# Patient Record
Sex: FEMALE | Race: WHITE | NOT HISPANIC OR LATINO | Employment: FULL TIME | URBAN - METROPOLITAN AREA
[De-identification: names, ages, dates, MRNs, and addresses within clinical notes are randomized per-mention and may not be internally consistent; named-entity substitution may affect disease eponyms.]

---

## 2019-11-26 ENCOUNTER — OFFICE VISIT (OUTPATIENT)
Dept: FAMILY MEDICINE | Facility: CLINIC | Age: 44
End: 2019-11-26
Payer: COMMERCIAL

## 2019-11-26 VITALS
HEART RATE: 96 BPM | HEIGHT: 60 IN | DIASTOLIC BLOOD PRESSURE: 80 MMHG | SYSTOLIC BLOOD PRESSURE: 126 MMHG | WEIGHT: 196 LBS | BODY MASS INDEX: 38.48 KG/M2

## 2019-11-26 DIAGNOSIS — Z00.00 PHYSICAL EXAM: ICD-10-CM

## 2019-11-26 DIAGNOSIS — F32.A DEPRESSION, UNSPECIFIED DEPRESSION TYPE: Primary | ICD-10-CM

## 2019-11-26 DIAGNOSIS — F41.9 ANXIETY: ICD-10-CM

## 2019-11-26 DIAGNOSIS — I10 HYPERTENSION, UNSPECIFIED TYPE: ICD-10-CM

## 2019-11-26 DIAGNOSIS — I10 ESSENTIAL HYPERTENSION: ICD-10-CM

## 2019-11-26 DIAGNOSIS — E66.9 OBESITY, UNSPECIFIED CLASSIFICATION, UNSPECIFIED OBESITY TYPE, UNSPECIFIED WHETHER SERIOUS COMORBIDITY PRESENT: ICD-10-CM

## 2019-11-26 PROCEDURE — 3079F DIAST BP 80-89 MM HG: CPT | Mod: S$GLB,,, | Performed by: NURSE PRACTITIONER

## 2019-11-26 PROCEDURE — 99214 OFFICE O/P EST MOD 30 MIN: CPT | Mod: S$GLB,,, | Performed by: NURSE PRACTITIONER

## 2019-11-26 PROCEDURE — 3079F PR MOST RECENT DIASTOLIC BLOOD PRESSURE 80-89 MM HG: ICD-10-PCS | Mod: S$GLB,,, | Performed by: NURSE PRACTITIONER

## 2019-11-26 PROCEDURE — 3008F BODY MASS INDEX DOCD: CPT | Mod: S$GLB,,, | Performed by: NURSE PRACTITIONER

## 2019-11-26 PROCEDURE — 3074F PR MOST RECENT SYSTOLIC BLOOD PRESSURE < 130 MM HG: ICD-10-PCS | Mod: S$GLB,,, | Performed by: NURSE PRACTITIONER

## 2019-11-26 PROCEDURE — 3008F PR BODY MASS INDEX (BMI) DOCUMENTED: ICD-10-PCS | Mod: S$GLB,,, | Performed by: NURSE PRACTITIONER

## 2019-11-26 PROCEDURE — 3074F SYST BP LT 130 MM HG: CPT | Mod: S$GLB,,, | Performed by: NURSE PRACTITIONER

## 2019-11-26 PROCEDURE — 99214 PR OFFICE/OUTPT VISIT, EST, LEVL IV, 30-39 MIN: ICD-10-PCS | Mod: S$GLB,,, | Performed by: NURSE PRACTITIONER

## 2019-11-26 RX ORDER — FLUTICASONE PROPIONATE 50 MCG
2 SPRAY, SUSPENSION (ML) NASAL EVERY MORNING
COMMUNITY
Start: 2019-04-08 | End: 2020-04-07

## 2019-11-26 RX ORDER — ESCITALOPRAM OXALATE 10 MG/1
10 TABLET ORAL DAILY
Qty: 90 TABLET | Refills: 1 | Status: SHIPPED | OUTPATIENT
Start: 2019-11-26 | End: 2020-05-28 | Stop reason: SDUPTHER

## 2019-11-26 RX ORDER — LISINOPRIL 10 MG/1
10 TABLET ORAL DAILY
Refills: 1 | COMMUNITY
Start: 2019-08-29 | End: 2019-11-26 | Stop reason: SDUPTHER

## 2019-11-26 RX ORDER — LISINOPRIL 10 MG/1
10 TABLET ORAL DAILY
Qty: 90 TABLET | Refills: 1 | Status: SHIPPED | OUTPATIENT
Start: 2019-11-26 | End: 2020-05-28 | Stop reason: SDUPTHER

## 2019-11-26 RX ORDER — CETIRIZINE HYDROCHLORIDE 10 MG/1
10 TABLET ORAL DAILY
COMMUNITY

## 2019-11-26 RX ORDER — ESCITALOPRAM OXALATE 10 MG/1
10 TABLET ORAL DAILY
Refills: 1 | COMMUNITY
Start: 2019-08-29 | End: 2019-11-26 | Stop reason: SDUPTHER

## 2019-11-26 NOTE — PROGRESS NOTES
SUBJECTIVE:    Patient ID: Zelda Acevedo is a 44 y.o. female.    Chief Complaint: Follow-up (SW)    PRESENTS FOR CHECK UP.  HAS BEEN FEELING OKAY.  REPORTS TAKING LEXAPRO AS PRESCRIBED.  BELIEVE THAT IS CONTROLLING SYMPTOMS OF ANXIETY AND DEPRESSION.  TAKING LISINOPRIL DAILY OCCASIONALLY CHECKS BLOOD PRESSURE AT HOME READINGS HAVE BEEN LESS THAN 130/90.  SINCE TAKING BLOOD PRESSURE MEDICATION HEADACHES HAVE RESOLVED.  HE HAS DAILY ALLERGIES.  TAKES ZYRTEC AND USES FLONASE WITH GOOD RESULTS.  SHE SLEEPS WELL.  DOES NOT EXERCISE.  LABS ARE DUE.  WILL PLAN TO DO BEFORE THE END OF THE YEAR.      No visits with results within 6 Month(s) from this visit.   Latest known visit with results is:   No results found for any previous visit.       History reviewed. No pertinent past medical history.  History reviewed. No pertinent surgical history.  History reviewed. No pertinent family history.    Marital Status:   Alcohol History:  reports that she drinks alcohol.  Tobacco History:  reports that she has never smoked. She has never used smokeless tobacco.  Drug History:  reports that she does not use drugs.    Review of patient's allergies indicates:  No Known Allergies    Current Outpatient Medications:     cetirizine (ZYRTEC) 10 MG tablet, Take 10 mg by mouth once daily., Disp: , Rfl:     escitalopram oxalate (LEXAPRO) 10 MG tablet, Take 1 tablet (10 mg total) by mouth once daily., Disp: 90 tablet, Rfl: 1    fluticasone propionate (FLONASE) 50 mcg/actuation nasal spray, 2 sprays by Each Nostril route every morning. , Disp: , Rfl:     lisinopril 10 MG tablet, Take 1 tablet (10 mg total) by mouth once daily., Disp: 90 tablet, Rfl: 1    Review of Systems   Constitutional: Negative for chills, fever and unexpected weight change.   HENT: Negative for ear pain, rhinorrhea and sore throat.    Eyes: Negative for pain and visual disturbance.   Respiratory: Negative for cough and shortness of breath.    Cardiovascular:  "Negative for chest pain, palpitations and leg swelling.   Gastrointestinal: Negative for abdominal pain, diarrhea, nausea and vomiting.   Genitourinary: Negative for difficulty urinating, hematuria and vaginal bleeding.   Musculoskeletal: Negative for arthralgias.   Skin: Negative for rash.   Neurological: Negative for dizziness, weakness and headaches.   Psychiatric/Behavioral: Negative for agitation and sleep disturbance. The patient is not nervous/anxious.           Objective:      Vitals:    11/26/19 0904   BP: 126/80   Pulse: 96   Weight: 88.9 kg (196 lb)   Height: 5' 0.25" (1.53 m)     Body mass index is 37.96 kg/m².  Physical Exam   Constitutional: She is oriented to person, place, and time. She appears well-developed and well-nourished.   HENT:   Head: Normocephalic.   Right Ear: External ear normal.   Left Ear: External ear normal.   Mouth/Throat: Oropharynx is clear and moist.   Eyes: Pupils are equal, round, and reactive to light. Conjunctivae are normal.   Neck: Normal range of motion. Neck supple. No JVD present.   Cardiovascular: Normal rate and regular rhythm.   No murmur heard.  Pulmonary/Chest: Effort normal and breath sounds normal.   Abdominal: Soft. Bowel sounds are normal.   Musculoskeletal: Normal range of motion. She exhibits no edema or deformity.   Lymphadenopathy:     She has no cervical adenopathy.   Neurological: She is alert and oriented to person, place, and time. Gait normal.   Skin: Skin is warm, dry and intact. No rash noted.   Psychiatric: She has a normal mood and affect. Her speech is normal and behavior is normal.         Assessment:       1. Depression, unspecified depression type    2. Hypertension, unspecified type    3. Anxiety    4. Essential hypertension    5. Obesity, unspecified classification, unspecified obesity type, unspecified whether serious comorbidity present    6. Physical exam         Plan:       Depression, unspecified depression type  -     escitalopram " oxalate (LEXAPRO) 10 MG tablet; Take 1 tablet (10 mg total) by mouth once daily.  Dispense: 90 tablet; Refill: 1    Hypertension, unspecified type  -     lisinopril 10 MG tablet; Take 1 tablet (10 mg total) by mouth once daily.  Dispense: 90 tablet; Refill: 1    Anxiety    Essential hypertension    Obesity, unspecified classification, unspecified obesity type, unspecified whether serious comorbidity present    Physical exam  Comments:  LABS AS ORDERED      Follow up in about 3 months (around 2/26/2020).

## 2020-06-04 ENCOUNTER — OFFICE VISIT (OUTPATIENT)
Dept: FAMILY MEDICINE | Facility: CLINIC | Age: 45
End: 2020-06-04
Payer: COMMERCIAL

## 2020-06-04 DIAGNOSIS — J30.1 ALLERGIC RHINITIS DUE TO POLLEN, UNSPECIFIED SEASONALITY: ICD-10-CM

## 2020-06-04 DIAGNOSIS — Z00.00 PHYSICAL EXAM: Primary | ICD-10-CM

## 2020-06-04 DIAGNOSIS — I10 HYPERTENSION, UNSPECIFIED TYPE: ICD-10-CM

## 2020-06-04 DIAGNOSIS — F32.A DEPRESSION, UNSPECIFIED DEPRESSION TYPE: ICD-10-CM

## 2020-06-04 PROCEDURE — 99213 PR OFFICE/OUTPT VISIT, EST, LEVL III, 20-29 MIN: ICD-10-PCS | Mod: 95,,, | Performed by: NURSE PRACTITIONER

## 2020-06-04 PROCEDURE — 99213 OFFICE O/P EST LOW 20 MIN: CPT | Mod: 95,,, | Performed by: NURSE PRACTITIONER

## 2020-06-04 RX ORDER — LISINOPRIL 10 MG/1
10 TABLET ORAL DAILY
Qty: 90 TABLET | Refills: 1 | Status: SHIPPED | OUTPATIENT
Start: 2020-06-04 | End: 2020-12-15 | Stop reason: SDUPTHER

## 2020-06-04 RX ORDER — ESCITALOPRAM OXALATE 20 MG/1
20 TABLET ORAL DAILY
Qty: 90 TABLET | Refills: 2 | Status: SHIPPED | OUTPATIENT
Start: 2020-06-04 | End: 2020-12-15 | Stop reason: SDUPTHER

## 2020-06-04 NOTE — PROGRESS NOTES
SUBJECTIVE:    Patient ID: Zelda Acevedo is a 44 y.o. female.    Chief Complaint: No chief complaint on file.    PRESENTS FOR CHECK UP.  HAS BEEN FEELING OKAY.  REPORTS TAKING LEXAPRO AS PRESCRIBED.  BELIEVE THAT IT IS NOT EFFECTIVE DUE TO BEING HOME. GETS IRRITABLE AT TIMES.   TAKING LISINOPRIL DAILY OCCASIONALLY CHECKS BLOOD PRESSURE AT HOME READINGS HAVE BEEN LESS THAN 130/90.  SINCE TAKING BLOOD PRESSURE MEDICATION HEADACHES HAVE RESOLVED.  HE HAS DAILY ALLERGIES.  TAKES ZYRTEC AND USES FLONASE WITH GOOD RESULTS.  SHE SLEEPS WELL.  DOES NOT EXERCISE.  LABS ARE DUE.  WILL PLAN TO DO BEFORE THE END OF THE YEAR.      No visits with results within 6 Month(s) from this visit.   Latest known visit with results is:   No results found for any previous visit.       No past medical history on file.  Past Surgical History:   Procedure Laterality Date     SECTION       SECTION      TONSILLECTOMY       No family history on file.    Marital Status:   Alcohol History:  reports that she drinks alcohol.  Tobacco History:  reports that she has never smoked. She has never used smokeless tobacco.  Drug History:  reports that she does not use drugs.    Review of patient's allergies indicates:  No Known Allergies    Current Outpatient Medications:     cetirizine (ZYRTEC) 10 MG tablet, Take 10 mg by mouth once daily., Disp: , Rfl:     escitalopram oxalate (LEXAPRO) 20 MG tablet, Take 1 tablet (20 mg total) by mouth once daily., Disp: 90 tablet, Rfl: 2    lisinopriL 10 MG tablet, Take 1 tablet (10 mg total) by mouth once daily., Disp: 90 tablet, Rfl: 1    Review of Systems   Constitutional: Negative for activity change and unexpected weight change.   HENT: Negative for hearing loss, rhinorrhea and trouble swallowing.    Eyes: Negative for discharge and visual disturbance.   Respiratory: Negative for chest tightness and wheezing.    Cardiovascular: Negative for chest pain and palpitations.    Gastrointestinal: Negative for blood in stool, constipation, diarrhea and vomiting.   Endocrine: Negative for polydipsia and polyuria.   Genitourinary: Negative for difficulty urinating, dysuria, hematuria and menstrual problem.   Musculoskeletal: Negative for arthralgias, joint swelling and neck pain.   Neurological: Negative for weakness and headaches.   Psychiatric/Behavioral: Positive for agitation. Negative for confusion and dysphoric mood.          Objective:      There were no vitals filed for this visit.  There is no height or weight on file to calculate BMI.  Physical Exam   Constitutional: She appears well-developed and well-nourished.         Assessment:       1. Physical exam    2. Depression, unspecified depression type    3. Hypertension, unspecified type    4. Allergic rhinitis due to pollen, unspecified seasonality         Plan:       Physical exam    Depression, unspecified depression type  -     escitalopram oxalate (LEXAPRO) 20 MG tablet; Take 1 tablet (20 mg total) by mouth once daily.  Dispense: 90 tablet; Refill: 2    Hypertension, unspecified type  -     lisinopriL 10 MG tablet; Take 1 tablet (10 mg total) by mouth once daily.  Dispense: 90 tablet; Refill: 1    Allergic rhinitis due to pollen, unspecified seasonality      Follow up in about 6 months (around 12/4/2020) for medication management.

## 2020-12-14 DIAGNOSIS — I10 HYPERTENSION, UNSPECIFIED TYPE: ICD-10-CM

## 2020-12-14 RX ORDER — LISINOPRIL 10 MG/1
10 TABLET ORAL DAILY
Qty: 90 TABLET | Refills: 1 | Status: CANCELLED | OUTPATIENT
Start: 2020-12-14

## 2020-12-14 NOTE — TELEPHONE ENCOUNTER
Pt hasn't been seen since June. Let pt know we need to get her scheduled for an appt. I was able to schedule pt for tomorrow

## 2020-12-15 ENCOUNTER — OFFICE VISIT (OUTPATIENT)
Dept: FAMILY MEDICINE | Facility: CLINIC | Age: 45
End: 2020-12-15
Payer: COMMERCIAL

## 2020-12-15 VITALS
DIASTOLIC BLOOD PRESSURE: 88 MMHG | HEIGHT: 60 IN | BODY MASS INDEX: 37.69 KG/M2 | WEIGHT: 192 LBS | HEART RATE: 76 BPM | SYSTOLIC BLOOD PRESSURE: 136 MMHG

## 2020-12-15 DIAGNOSIS — J30.1 ALLERGIC RHINITIS DUE TO POLLEN, UNSPECIFIED SEASONALITY: ICD-10-CM

## 2020-12-15 DIAGNOSIS — F32.A DEPRESSION, UNSPECIFIED DEPRESSION TYPE: ICD-10-CM

## 2020-12-15 DIAGNOSIS — Z12.31 OTHER SCREENING MAMMOGRAM: Primary | ICD-10-CM

## 2020-12-15 DIAGNOSIS — I10 HYPERTENSION, UNSPECIFIED TYPE: ICD-10-CM

## 2020-12-15 DIAGNOSIS — Z79.899 HIGH RISK MEDICATION USE: ICD-10-CM

## 2020-12-15 PROCEDURE — 3075F SYST BP GE 130 - 139MM HG: CPT | Mod: S$GLB,,, | Performed by: NURSE PRACTITIONER

## 2020-12-15 PROCEDURE — 3075F PR MOST RECENT SYSTOLIC BLOOD PRESS GE 130-139MM HG: ICD-10-PCS | Mod: S$GLB,,, | Performed by: NURSE PRACTITIONER

## 2020-12-15 PROCEDURE — 99214 PR OFFICE/OUTPT VISIT, EST, LEVL IV, 30-39 MIN: ICD-10-PCS | Mod: S$GLB,,, | Performed by: NURSE PRACTITIONER

## 2020-12-15 PROCEDURE — 3008F PR BODY MASS INDEX (BMI) DOCUMENTED: ICD-10-PCS | Mod: S$GLB,,, | Performed by: NURSE PRACTITIONER

## 2020-12-15 PROCEDURE — 99214 OFFICE O/P EST MOD 30 MIN: CPT | Mod: S$GLB,,, | Performed by: NURSE PRACTITIONER

## 2020-12-15 PROCEDURE — 3008F BODY MASS INDEX DOCD: CPT | Mod: S$GLB,,, | Performed by: NURSE PRACTITIONER

## 2020-12-15 PROCEDURE — 3079F PR MOST RECENT DIASTOLIC BLOOD PRESSURE 80-89 MM HG: ICD-10-PCS | Mod: S$GLB,,, | Performed by: NURSE PRACTITIONER

## 2020-12-15 PROCEDURE — 3079F DIAST BP 80-89 MM HG: CPT | Mod: S$GLB,,, | Performed by: NURSE PRACTITIONER

## 2020-12-15 RX ORDER — LISINOPRIL 10 MG/1
10 TABLET ORAL DAILY
Qty: 90 TABLET | Refills: 1 | Status: SHIPPED | OUTPATIENT
Start: 2020-12-15 | End: 2021-04-20 | Stop reason: SDUPTHER

## 2020-12-15 RX ORDER — LISINOPRIL 10 MG/1
10 TABLET ORAL DAILY
Qty: 30 TABLET | Refills: 0 | Status: SHIPPED | OUTPATIENT
Start: 2020-12-15 | End: 2021-06-15

## 2020-12-15 RX ORDER — ESCITALOPRAM OXALATE 20 MG/1
20 TABLET ORAL DAILY
Qty: 90 TABLET | Refills: 2 | Status: SHIPPED | OUTPATIENT
Start: 2020-12-15 | End: 2021-06-15

## 2020-12-15 RX ORDER — ESCITALOPRAM OXALATE 20 MG/1
20 TABLET ORAL DAILY
Qty: 30 TABLET | Refills: 0 | Status: SHIPPED | OUTPATIENT
Start: 2020-12-15 | End: 2021-06-15 | Stop reason: SDUPTHER

## 2020-12-15 NOTE — PROGRESS NOTES
SUBJECTIVE:    Patient ID: Zelda Acevedo is a 45 y.o. female.    Chief Complaint: Medication Refill (no bottles, went over meds verbally, Mammo ordered// SW)    45 YEAR OLD FEMALE PRESENTS FOR CHECK UP.  HAS BEEN FEELING OKAY.  REPORTS TAKING LEXAPRO AS PRESCRIBED.  BELIEVE THAT IT IS MUCH MORE EFFECTIVE SINCE INCREASING DOSE. NOTGETTING IRRITABLE.   TAKING LISINOPRIL DAILY OCCASIONALLY CHECKS BLOOD PRESSURE AT HOME READINGS HAVE BEEN LESS THAN 130/90.  SINCE TAKING BLOOD PRESSURE MEDICATION HEADACHES HAVE RESOLVED.  HE HAS DAILY ALLERGIES.  TAKES ZYRTEC AND USES FLONASE WITH GOOD RESULTS.  SHE SLEEPS WELL.  DOES NOT EXERCISE.  LABS ARE DUE.  WILL PLAN TO DO BEFORE THE END OF THE YEAR.      No visits with results within 6 Month(s) from this visit.   Latest known visit with results is:   No results found for any previous visit.       History reviewed. No pertinent past medical history.  Social History     Socioeconomic History    Marital status:      Spouse name: Not on file    Number of children: Not on file    Years of education: Not on file    Highest education level: Not on file   Occupational History    Not on file   Social Needs    Financial resource strain: Not hard at all    Food insecurity     Worry: Never true     Inability: Never true    Transportation needs     Medical: No     Non-medical: No   Tobacco Use    Smoking status: Never Smoker    Smokeless tobacco: Never Used   Substance and Sexual Activity    Alcohol use: Yes     Frequency: Monthly or less     Drinks per session: 1 or 2     Binge frequency: Never    Drug use: Never    Sexual activity: Not on file   Lifestyle    Physical activity     Days per week: 2 days     Minutes per session: 30 min    Stress: To some extent   Relationships    Social connections     Talks on phone: More than three times a week     Gets together: Patient refused     Attends [employer] service: Not on file     Active member of club or organization: No  "    Attends meetings of clubs or organizations: Never     Relationship status:    Other Topics Concern    Not on file   Social History Narrative    Not on file     Past Surgical History:   Procedure Laterality Date     SECTION       SECTION      TONSILLECTOMY       History reviewed. No pertinent family history.    Review of patient's allergies indicates:  No Known Allergies    Current Outpatient Medications:     cetirizine (ZYRTEC) 10 MG tablet, Take 10 mg by mouth once daily., Disp: , Rfl:     escitalopram oxalate (LEXAPRO) 20 MG tablet, Take 1 tablet (20 mg total) by mouth once daily., Disp: 90 tablet, Rfl: 2    escitalopram oxalate (LEXAPRO) 20 MG tablet, Take 1 tablet (20 mg total) by mouth once daily., Disp: 30 tablet, Rfl: 0    lisinopriL 10 MG tablet, Take 1 tablet (10 mg total) by mouth once daily., Disp: 90 tablet, Rfl: 1    lisinopriL 10 MG tablet, Take 1 tablet (10 mg total) by mouth once daily., Disp: 30 tablet, Rfl: 0    Review of Systems   Constitutional: Negative for chills, fever and unexpected weight change.   HENT: Negative for ear pain, rhinorrhea and sore throat.    Eyes: Negative for pain and visual disturbance.   Respiratory: Negative for cough and shortness of breath.    Cardiovascular: Negative for chest pain, palpitations and leg swelling.   Gastrointestinal: Negative for abdominal pain, diarrhea, nausea and vomiting.   Genitourinary: Negative for difficulty urinating, hematuria and vaginal bleeding.   Musculoskeletal: Negative for arthralgias.   Skin: Negative for rash.   Neurological: Negative for dizziness, weakness and headaches.   Psychiatric/Behavioral: Negative for agitation and sleep disturbance. The patient is not nervous/anxious.           Objective:      Vitals:    12/15/20 1258   BP: 136/88   Pulse: 76   Weight: 87.1 kg (192 lb)   Height: 5' 0.25" (1.53 m)     Physical Exam  Constitutional:       Appearance: She is well-developed.   HENT:      " Right Ear: External ear normal.      Left Ear: External ear normal.   Eyes:      Conjunctiva/sclera: Conjunctivae normal.      Pupils: Pupils are equal, round, and reactive to light.   Neck:      Musculoskeletal: Normal range of motion and neck supple.      Vascular: No JVD.   Cardiovascular:      Rate and Rhythm: Normal rate and regular rhythm.      Heart sounds: No murmur.   Pulmonary:      Effort: Pulmonary effort is normal.      Breath sounds: Normal breath sounds.   Abdominal:      General: Bowel sounds are normal.      Palpations: Abdomen is soft.   Musculoskeletal: Normal range of motion.         General: No deformity.   Lymphadenopathy:      Cervical: No cervical adenopathy.   Skin:     General: Skin is warm and dry.      Findings: No rash.   Neurological:      Mental Status: She is alert and oriented to person, place, and time.      Gait: Gait normal.   Psychiatric:         Speech: Speech normal.         Behavior: Behavior normal.           Assessment:       1. Other screening mammogram    2. Hypertension, unspecified type    3. Depression, unspecified depression type    4. High risk medication use    5. Allergic rhinitis due to pollen, unspecified seasonality         Plan:       Other screening mammogram  -     Mammo Digital Screening Bilat; Future; Expected date: 12/15/2020    Hypertension, unspecified type  -     lisinopriL 10 MG tablet; Take 1 tablet (10 mg total) by mouth once daily.  Dispense: 90 tablet; Refill: 1  -     lisinopriL 10 MG tablet; Take 1 tablet (10 mg total) by mouth once daily.  Dispense: 30 tablet; Refill: 0  -     CBC Auto Differential; Future; Expected date: 12/15/2020  -     Comprehensive Metabolic Panel; Future; Expected date: 12/15/2020  -     TSH w/reflex to FT4; Future; Expected date: 12/15/2020  -     Lipid Panel; Future; Expected date: 12/15/2020    Depression, unspecified depression type  -     escitalopram oxalate (LEXAPRO) 20 MG tablet; Take 1 tablet (20 mg total) by mouth  once daily.  Dispense: 90 tablet; Refill: 2  -     escitalopram oxalate (LEXAPRO) 20 MG tablet; Take 1 tablet (20 mg total) by mouth once daily.  Dispense: 30 tablet; Refill: 0  -     CBC Auto Differential; Future; Expected date: 12/15/2020  -     Comprehensive Metabolic Panel; Future; Expected date: 12/15/2020  -     TSH w/reflex to FT4; Future; Expected date: 12/15/2020  -     Lipid Panel; Future; Expected date: 12/15/2020    High risk medication use  -     CBC Auto Differential; Future; Expected date: 12/15/2020  -     Comprehensive Metabolic Panel; Future; Expected date: 12/15/2020  -     TSH w/reflex to FT4; Future; Expected date: 12/15/2020  -     Urinalysis, Reflex to Urine Culture Urine, Clean Catch; Future; Expected date: 12/15/2020  -     Lipid Panel; Future; Expected date: 12/15/2020    Allergic rhinitis due to pollen, unspecified seasonality      Follow up in about 6 months (around 6/15/2021) for medication management.        12/15/2020 Renetta Bunch

## 2021-01-08 ENCOUNTER — HOSPITAL ENCOUNTER (OUTPATIENT)
Dept: RADIOLOGY | Facility: HOSPITAL | Age: 46
Discharge: HOME | End: 2021-01-08
Attending: NURSE PRACTITIONER
Payer: COMMERCIAL

## 2021-01-08 DIAGNOSIS — Z12.31 OTHER SCREENING MAMMOGRAM: ICD-10-CM

## 2021-01-08 PROCEDURE — 77067 SCR MAMMO BI INCL CAD: CPT | Mod: TC,PO

## 2021-01-11 ENCOUNTER — TELEPHONE (OUTPATIENT)
Dept: FAMILY MEDICINE | Facility: CLINIC | Age: 46
End: 2021-01-11

## 2021-01-14 ENCOUNTER — TELEPHONE (OUTPATIENT)
Dept: FAMILY MEDICINE | Facility: CLINIC | Age: 46
End: 2021-01-14

## 2021-01-24 LAB
ALBUMIN SERPL-MCNC: 4.4 G/DL (ref 3.6–5.1)
ALBUMIN/GLOB SERPL: 1.4 (CALC) (ref 1–2.5)
ALP SERPL-CCNC: 54 U/L (ref 31–125)
ALT SERPL-CCNC: 28 U/L (ref 6–29)
APPEARANCE UR: CLEAR
AST SERPL-CCNC: 23 U/L (ref 10–30)
BACTERIA #/AREA URNS HPF: NORMAL /HPF
BACTERIA UR CULT: NORMAL
BASOPHILS # BLD AUTO: 101 CELLS/UL (ref 0–200)
BASOPHILS NFR BLD AUTO: 1.3 %
BILIRUB SERPL-MCNC: 0.5 MG/DL (ref 0.2–1.2)
BILIRUB UR QL STRIP: NEGATIVE
BUN SERPL-MCNC: 15 MG/DL (ref 7–25)
BUN/CREAT SERPL: NORMAL (CALC) (ref 6–22)
CALCIUM SERPL-MCNC: 9.5 MG/DL (ref 8.6–10.2)
CHLORIDE SERPL-SCNC: 104 MMOL/L (ref 98–110)
CHOLEST SERPL-MCNC: 244 MG/DL
CHOLEST/HDLC SERPL: 5.1 (CALC)
CO2 SERPL-SCNC: 28 MMOL/L (ref 20–32)
COLOR UR: YELLOW
CREAT SERPL-MCNC: 0.62 MG/DL (ref 0.5–1.1)
EOSINOPHIL # BLD AUTO: 452 CELLS/UL (ref 15–500)
EOSINOPHIL NFR BLD AUTO: 5.8 %
ERYTHROCYTE [DISTWIDTH] IN BLOOD BY AUTOMATED COUNT: 12.9 % (ref 11–15)
GFRSERPLBLD MDRD-ARVRAT: 110 ML/MIN/1.73M2
GLOBULIN SER CALC-MCNC: 3.1 G/DL (CALC) (ref 1.9–3.7)
GLUCOSE SERPL-MCNC: 89 MG/DL (ref 65–99)
GLUCOSE UR QL STRIP: NEGATIVE
HCT VFR BLD AUTO: 41.8 % (ref 35–45)
HDLC SERPL-MCNC: 48 MG/DL
HGB BLD-MCNC: 13.4 G/DL (ref 11.7–15.5)
HGB UR QL STRIP: NEGATIVE
HYALINE CASTS #/AREA URNS LPF: NORMAL /LPF
KETONES UR QL STRIP: NEGATIVE
LDLC SERPL CALC-MCNC: 163 MG/DL (CALC)
LEUKOCYTE ESTERASE UR QL STRIP: NEGATIVE
LYMPHOCYTES # BLD AUTO: 2044 CELLS/UL (ref 850–3900)
LYMPHOCYTES NFR BLD AUTO: 26.2 %
MCH RBC QN AUTO: 27.5 PG (ref 27–33)
MCHC RBC AUTO-ENTMCNC: 32.1 G/DL (ref 32–36)
MCV RBC AUTO: 85.8 FL (ref 80–100)
MONOCYTES # BLD AUTO: 569 CELLS/UL (ref 200–950)
MONOCYTES NFR BLD AUTO: 7.3 %
NEUTROPHILS # BLD AUTO: 4633 CELLS/UL (ref 1500–7800)
NEUTROPHILS NFR BLD AUTO: 59.4 %
NITRITE UR QL STRIP: NEGATIVE
NONHDLC SERPL-MCNC: 196 MG/DL (CALC)
PH UR STRIP: 6 [PH] (ref 5–8)
PLATELET # BLD AUTO: 297 THOUSAND/UL (ref 140–400)
PMV BLD REES-ECKER: 9.2 FL (ref 7.5–12.5)
POTASSIUM SERPL-SCNC: 4.3 MMOL/L (ref 3.5–5.3)
PROT SERPL-MCNC: 7.5 G/DL (ref 6.1–8.1)
PROT UR QL STRIP: NEGATIVE
RBC # BLD AUTO: 4.87 MILLION/UL (ref 3.8–5.1)
RBC #/AREA URNS HPF: NORMAL /HPF
SODIUM SERPL-SCNC: 139 MMOL/L (ref 135–146)
SP GR UR STRIP: 1.02 (ref 1–1.03)
SQUAMOUS #/AREA URNS HPF: NORMAL /HPF
TRIGL SERPL-MCNC: 176 MG/DL
TSH SERPL-ACNC: 3.01 MIU/L
WBC # BLD AUTO: 7.8 THOUSAND/UL (ref 3.8–10.8)
WBC #/AREA URNS HPF: NORMAL /HPF

## 2021-02-01 ENCOUNTER — PATIENT MESSAGE (OUTPATIENT)
Dept: FAMILY MEDICINE | Facility: CLINIC | Age: 46
End: 2021-02-01

## 2021-02-01 DIAGNOSIS — Z79.899 HIGH RISK MEDICATION USE: ICD-10-CM

## 2021-02-01 DIAGNOSIS — E78.5 HYPERLIPIDEMIA, UNSPECIFIED HYPERLIPIDEMIA TYPE: Primary | ICD-10-CM

## 2021-02-01 RX ORDER — ROSUVASTATIN CALCIUM 10 MG/1
10 TABLET, COATED ORAL DAILY
Qty: 90 TABLET | Refills: 3 | Status: SHIPPED | OUTPATIENT
Start: 2021-02-01 | End: 2021-06-15 | Stop reason: SDUPTHER

## 2021-03-09 ENCOUNTER — TELEPHONE (OUTPATIENT)
Dept: FAMILY MEDICINE | Facility: CLINIC | Age: 46
End: 2021-03-09

## 2021-03-25 ENCOUNTER — TELEPHONE (OUTPATIENT)
Dept: FAMILY MEDICINE | Facility: CLINIC | Age: 46
End: 2021-03-25

## 2021-04-13 LAB
HUMAN PAPILLOMAVIRUS (HPV): NORMAL
PAP RECOMMENDATION EXT: NORMAL
PAP SMEAR: NORMAL

## 2021-04-24 LAB
ALBUMIN SERPL-MCNC: 4.4 G/DL (ref 3.6–5.1)
ALBUMIN/GLOB SERPL: 1.6 (CALC) (ref 1–2.5)
ALP SERPL-CCNC: 59 U/L (ref 31–125)
ALT SERPL-CCNC: 35 U/L (ref 6–29)
AST SERPL-CCNC: 24 U/L (ref 10–30)
BILIRUB SERPL-MCNC: 0.6 MG/DL (ref 0.2–1.2)
BUN SERPL-MCNC: 18 MG/DL (ref 7–25)
BUN/CREAT SERPL: ABNORMAL (CALC) (ref 6–22)
CALCIUM SERPL-MCNC: 9.1 MG/DL (ref 8.6–10.2)
CHLORIDE SERPL-SCNC: 107 MMOL/L (ref 98–110)
CHOLEST SERPL-MCNC: 160 MG/DL
CHOLEST/HDLC SERPL: 3.3 (CALC)
CO2 SERPL-SCNC: 26 MMOL/L (ref 20–32)
CREAT SERPL-MCNC: 0.66 MG/DL (ref 0.5–1.1)
GLOBULIN SER CALC-MCNC: 2.8 G/DL (CALC) (ref 1.9–3.7)
GLUCOSE SERPL-MCNC: 93 MG/DL (ref 65–99)
HDLC SERPL-MCNC: 48 MG/DL
LDLC SERPL CALC-MCNC: 89 MG/DL (CALC)
NONHDLC SERPL-MCNC: 112 MG/DL (CALC)
POTASSIUM SERPL-SCNC: 4.2 MMOL/L (ref 3.5–5.3)
PROT SERPL-MCNC: 7.2 G/DL (ref 6.1–8.1)
SODIUM SERPL-SCNC: 142 MMOL/L (ref 135–146)
TRIGL SERPL-MCNC: 129 MG/DL

## 2021-06-15 ENCOUNTER — OFFICE VISIT (OUTPATIENT)
Dept: FAMILY MEDICINE | Facility: CLINIC | Age: 46
End: 2021-06-15
Payer: COMMERCIAL

## 2021-06-15 VITALS
HEIGHT: 60 IN | SYSTOLIC BLOOD PRESSURE: 132 MMHG | DIASTOLIC BLOOD PRESSURE: 84 MMHG | HEART RATE: 76 BPM | BODY MASS INDEX: 40.84 KG/M2 | WEIGHT: 208 LBS

## 2021-06-15 DIAGNOSIS — F32.A DEPRESSION, UNSPECIFIED DEPRESSION TYPE: ICD-10-CM

## 2021-06-15 DIAGNOSIS — Z79.899 HIGH RISK MEDICATION USE: ICD-10-CM

## 2021-06-15 DIAGNOSIS — Z00.00 PHYSICAL EXAM: Primary | ICD-10-CM

## 2021-06-15 DIAGNOSIS — I10 HYPERTENSION, UNSPECIFIED TYPE: ICD-10-CM

## 2021-06-15 DIAGNOSIS — R87.619 ABNORMAL CERVICAL PAPANICOLAOU SMEAR, UNSPECIFIED ABNORMAL PAP FINDING: ICD-10-CM

## 2021-06-15 DIAGNOSIS — E78.5 HYPERLIPIDEMIA, UNSPECIFIED HYPERLIPIDEMIA TYPE: ICD-10-CM

## 2021-06-15 PROCEDURE — 3008F PR BODY MASS INDEX (BMI) DOCUMENTED: ICD-10-PCS | Mod: S$GLB,,, | Performed by: NURSE PRACTITIONER

## 2021-06-15 PROCEDURE — 3075F SYST BP GE 130 - 139MM HG: CPT | Mod: S$GLB,,, | Performed by: NURSE PRACTITIONER

## 2021-06-15 PROCEDURE — 3079F PR MOST RECENT DIASTOLIC BLOOD PRESSURE 80-89 MM HG: ICD-10-PCS | Mod: S$GLB,,, | Performed by: NURSE PRACTITIONER

## 2021-06-15 PROCEDURE — 3008F BODY MASS INDEX DOCD: CPT | Mod: S$GLB,,, | Performed by: NURSE PRACTITIONER

## 2021-06-15 PROCEDURE — 99214 PR OFFICE/OUTPT VISIT, EST, LEVL IV, 30-39 MIN: ICD-10-PCS | Mod: S$GLB,,, | Performed by: NURSE PRACTITIONER

## 2021-06-15 PROCEDURE — 99214 OFFICE O/P EST MOD 30 MIN: CPT | Mod: S$GLB,,, | Performed by: NURSE PRACTITIONER

## 2021-06-15 PROCEDURE — 3079F DIAST BP 80-89 MM HG: CPT | Mod: S$GLB,,, | Performed by: NURSE PRACTITIONER

## 2021-06-15 PROCEDURE — 3075F PR MOST RECENT SYSTOLIC BLOOD PRESS GE 130-139MM HG: ICD-10-PCS | Mod: S$GLB,,, | Performed by: NURSE PRACTITIONER

## 2021-06-15 RX ORDER — ESCITALOPRAM OXALATE 20 MG/1
20 TABLET ORAL DAILY
Qty: 90 TABLET | Refills: 1 | Status: SHIPPED | OUTPATIENT
Start: 2021-06-15 | End: 2021-12-15 | Stop reason: SDUPTHER

## 2021-06-15 RX ORDER — LISINOPRIL 10 MG/1
10 TABLET ORAL DAILY
Qty: 90 TABLET | Refills: 1 | Status: SHIPPED | OUTPATIENT
Start: 2021-06-15 | End: 2021-12-15 | Stop reason: SDUPTHER

## 2021-06-15 RX ORDER — ROSUVASTATIN CALCIUM 10 MG/1
10 TABLET, COATED ORAL DAILY
Qty: 90 TABLET | Refills: 3 | Status: SHIPPED | OUTPATIENT
Start: 2021-06-15 | End: 2021-12-15 | Stop reason: SDUPTHER

## 2021-12-15 ENCOUNTER — OFFICE VISIT (OUTPATIENT)
Dept: FAMILY MEDICINE | Facility: CLINIC | Age: 46
End: 2021-12-15
Payer: COMMERCIAL

## 2021-12-15 VITALS
WEIGHT: 202 LBS | DIASTOLIC BLOOD PRESSURE: 86 MMHG | SYSTOLIC BLOOD PRESSURE: 132 MMHG | HEART RATE: 76 BPM | BODY MASS INDEX: 39.66 KG/M2 | HEIGHT: 60 IN

## 2021-12-15 DIAGNOSIS — F32.A DEPRESSION, UNSPECIFIED DEPRESSION TYPE: ICD-10-CM

## 2021-12-15 DIAGNOSIS — Z23 NEED FOR INFLUENZA VACCINATION: Primary | ICD-10-CM

## 2021-12-15 DIAGNOSIS — Z12.31 OTHER SCREENING MAMMOGRAM: ICD-10-CM

## 2021-12-15 DIAGNOSIS — Z79.899 HIGH RISK MEDICATION USE: ICD-10-CM

## 2021-12-15 DIAGNOSIS — E78.5 HYPERLIPIDEMIA, UNSPECIFIED HYPERLIPIDEMIA TYPE: ICD-10-CM

## 2021-12-15 DIAGNOSIS — I10 HYPERTENSION, UNSPECIFIED TYPE: ICD-10-CM

## 2021-12-15 PROCEDURE — 99214 OFFICE O/P EST MOD 30 MIN: CPT | Mod: 25,S$GLB,, | Performed by: NURSE PRACTITIONER

## 2021-12-15 PROCEDURE — 4010F ACE/ARB THERAPY RXD/TAKEN: CPT | Mod: S$GLB,,, | Performed by: NURSE PRACTITIONER

## 2021-12-15 PROCEDURE — 3061F NEG MICROALBUMINURIA REV: CPT | Mod: S$GLB,,, | Performed by: NURSE PRACTITIONER

## 2021-12-15 PROCEDURE — 3061F PR NEG MICROALBUMINURIA RESULT DOCUMENTED/REVIEW: ICD-10-PCS | Mod: S$GLB,,, | Performed by: NURSE PRACTITIONER

## 2021-12-15 PROCEDURE — 90471 IMMUNIZATION ADMIN: CPT | Mod: S$GLB,,, | Performed by: NURSE PRACTITIONER

## 2021-12-15 PROCEDURE — 99214 PR OFFICE/OUTPT VISIT, EST, LEVL IV, 30-39 MIN: ICD-10-PCS | Mod: 25,S$GLB,, | Performed by: NURSE PRACTITIONER

## 2021-12-15 PROCEDURE — 90682 RIV4 VACC RECOMBINANT DNA IM: CPT | Mod: S$GLB,,, | Performed by: NURSE PRACTITIONER

## 2021-12-15 PROCEDURE — 90471 FLU VACCINE - QUADRIVALENT (RECOMBINANT) PRESERVATIVE FREE: ICD-10-PCS | Mod: S$GLB,,, | Performed by: NURSE PRACTITIONER

## 2021-12-15 PROCEDURE — 90682 FLU VACCINE - QUADRIVALENT (RECOMBINANT) PRESERVATIVE FREE: ICD-10-PCS | Mod: S$GLB,,, | Performed by: NURSE PRACTITIONER

## 2021-12-15 PROCEDURE — 3066F PR DOCUMENTATION OF TREATMENT FOR NEPHROPATHY: ICD-10-PCS | Mod: S$GLB,,, | Performed by: NURSE PRACTITIONER

## 2021-12-15 PROCEDURE — 4010F PR ACE/ARB THEARPY RXD/TAKEN: ICD-10-PCS | Mod: S$GLB,,, | Performed by: NURSE PRACTITIONER

## 2021-12-15 PROCEDURE — 3066F NEPHROPATHY DOC TX: CPT | Mod: S$GLB,,, | Performed by: NURSE PRACTITIONER

## 2021-12-15 RX ORDER — LISINOPRIL 10 MG/1
10 TABLET ORAL DAILY
Qty: 90 TABLET | Refills: 1 | Status: SHIPPED | OUTPATIENT
Start: 2021-12-15 | End: 2022-06-22 | Stop reason: SDUPTHER

## 2021-12-15 RX ORDER — ROSUVASTATIN CALCIUM 10 MG/1
10 TABLET, COATED ORAL DAILY
Qty: 90 TABLET | Refills: 3 | Status: SHIPPED | OUTPATIENT
Start: 2021-12-15 | End: 2021-12-21

## 2021-12-15 RX ORDER — CHLORHEXIDINE GLUCONATE ORAL RINSE 1.2 MG/ML
SOLUTION DENTAL
COMMUNITY
Start: 2021-10-09 | End: 2021-12-15

## 2021-12-15 RX ORDER — ESCITALOPRAM OXALATE 20 MG/1
20 TABLET ORAL DAILY
Qty: 90 TABLET | Refills: 1 | Status: SHIPPED | OUTPATIENT
Start: 2021-12-15 | End: 2022-06-22 | Stop reason: SDUPTHER

## 2021-12-16 LAB
ALBUMIN SERPL-MCNC: 4.4 G/DL (ref 3.6–5.1)
ALBUMIN/CREAT UR: 4 MCG/MG CREAT
ALBUMIN/GLOB SERPL: 1.4 (CALC) (ref 1–2.5)
ALP SERPL-CCNC: 62 U/L (ref 31–125)
ALT SERPL-CCNC: 29 U/L (ref 6–29)
APPEARANCE UR: CLEAR
AST SERPL-CCNC: 18 U/L (ref 10–35)
BACTERIA #/AREA URNS HPF: NORMAL /HPF
BACTERIA UR CULT: NORMAL
BASOPHILS # BLD AUTO: 79 CELLS/UL (ref 0–200)
BASOPHILS NFR BLD AUTO: 1 %
BILIRUB SERPL-MCNC: 0.4 MG/DL (ref 0.2–1.2)
BILIRUB UR QL STRIP: NEGATIVE
BUN SERPL-MCNC: 16 MG/DL (ref 7–25)
BUN/CREAT SERPL: NORMAL (CALC) (ref 6–22)
CALCIUM SERPL-MCNC: 9.7 MG/DL (ref 8.6–10.2)
CHLORIDE SERPL-SCNC: 104 MMOL/L (ref 98–110)
CHOLEST SERPL-MCNC: 209 MG/DL
CHOLEST/HDLC SERPL: 3.9 (CALC)
CO2 SERPL-SCNC: 31 MMOL/L (ref 20–32)
COLOR UR: YELLOW
CREAT SERPL-MCNC: 0.66 MG/DL (ref 0.5–1.1)
CREAT UR-MCNC: 103 MG/DL (ref 20–275)
EOSINOPHIL # BLD AUTO: 474 CELLS/UL (ref 15–500)
EOSINOPHIL NFR BLD AUTO: 6 %
ERYTHROCYTE [DISTWIDTH] IN BLOOD BY AUTOMATED COUNT: 12.3 % (ref 11–15)
GLOBULIN SER CALC-MCNC: 3.1 G/DL (CALC) (ref 1.9–3.7)
GLUCOSE SERPL-MCNC: 84 MG/DL (ref 65–99)
GLUCOSE UR QL STRIP: NEGATIVE
HCT VFR BLD AUTO: 39 % (ref 35–45)
HDLC SERPL-MCNC: 53 MG/DL
HGB BLD-MCNC: 13 G/DL (ref 11.7–15.5)
HGB UR QL STRIP: NEGATIVE
HYALINE CASTS #/AREA URNS LPF: NORMAL /LPF
KETONES UR QL STRIP: NEGATIVE
LDLC SERPL CALC-MCNC: 129 MG/DL (CALC)
LEUKOCYTE ESTERASE UR QL STRIP: NEGATIVE
LYMPHOCYTES # BLD AUTO: 2607 CELLS/UL (ref 850–3900)
LYMPHOCYTES NFR BLD AUTO: 33 %
MCH RBC QN AUTO: 28.1 PG (ref 27–33)
MCHC RBC AUTO-ENTMCNC: 33.3 G/DL (ref 32–36)
MCV RBC AUTO: 84.2 FL (ref 80–100)
MICROALBUMIN UR-MCNC: 0.4 MG/DL
MONOCYTES # BLD AUTO: 600 CELLS/UL (ref 200–950)
MONOCYTES NFR BLD AUTO: 7.6 %
NEUTROPHILS # BLD AUTO: 4140 CELLS/UL (ref 1500–7800)
NEUTROPHILS NFR BLD AUTO: 52.4 %
NITRITE UR QL STRIP: NEGATIVE
NONHDLC SERPL-MCNC: 156 MG/DL (CALC)
PH UR STRIP: 6.5 [PH] (ref 5–8)
PLATELET # BLD AUTO: 272 THOUSAND/UL (ref 140–400)
PMV BLD REES-ECKER: 8.9 FL (ref 7.5–12.5)
POTASSIUM SERPL-SCNC: 4.4 MMOL/L (ref 3.5–5.3)
PROT SERPL-MCNC: 7.5 G/DL (ref 6.1–8.1)
PROT UR QL STRIP: NEGATIVE
RBC # BLD AUTO: 4.63 MILLION/UL (ref 3.8–5.1)
RBC #/AREA URNS HPF: NORMAL /HPF
SODIUM SERPL-SCNC: 140 MMOL/L (ref 135–146)
SP GR UR STRIP: 1.02 (ref 1–1.03)
SQUAMOUS #/AREA URNS HPF: NORMAL /HPF
TRIGL SERPL-MCNC: 154 MG/DL
TSH SERPL-ACNC: 2.16 MIU/L
WBC # BLD AUTO: 7.9 THOUSAND/UL (ref 3.8–10.8)
WBC #/AREA URNS HPF: NORMAL /HPF

## 2021-12-21 ENCOUNTER — PATIENT MESSAGE (OUTPATIENT)
Dept: FAMILY MEDICINE | Facility: CLINIC | Age: 46
End: 2021-12-21
Payer: COMMERCIAL

## 2021-12-21 ENCOUNTER — TELEPHONE (OUTPATIENT)
Dept: FAMILY MEDICINE | Facility: CLINIC | Age: 46
End: 2021-12-21
Payer: COMMERCIAL

## 2021-12-21 RX ORDER — ROSUVASTATIN CALCIUM 20 MG/1
20 TABLET, COATED ORAL DAILY
Qty: 90 TABLET | Refills: 3 | Status: SHIPPED | OUTPATIENT
Start: 2021-12-21 | End: 2022-12-22 | Stop reason: SDUPTHER

## 2021-12-21 RX ORDER — ROSUVASTATIN CALCIUM 20 MG/1
20 TABLET, COATED ORAL DAILY
Qty: 90 TABLET | Refills: 3 | Status: SHIPPED | OUTPATIENT
Start: 2021-12-21 | End: 2021-12-21 | Stop reason: SDUPTHER

## 2021-12-22 ENCOUNTER — TELEPHONE (OUTPATIENT)
Dept: FAMILY MEDICINE | Facility: CLINIC | Age: 46
End: 2021-12-22
Payer: COMMERCIAL

## 2022-01-04 ENCOUNTER — PATIENT MESSAGE (OUTPATIENT)
Dept: FAMILY MEDICINE | Facility: CLINIC | Age: 47
End: 2022-01-04
Payer: COMMERCIAL

## 2022-01-04 DIAGNOSIS — Z12.31 OTHER SCREENING MAMMOGRAM: Primary | ICD-10-CM

## 2022-01-12 ENCOUNTER — LAB VISIT (OUTPATIENT)
Dept: PRIMARY CARE CLINIC | Facility: OTHER | Age: 47
End: 2022-01-12
Attending: INTERNAL MEDICINE
Payer: COMMERCIAL

## 2022-01-12 DIAGNOSIS — Z20.822 ENCOUNTER FOR LABORATORY TESTING FOR COVID-19 VIRUS: ICD-10-CM

## 2022-01-12 PROCEDURE — U0003 INFECTIOUS AGENT DETECTION BY NUCLEIC ACID (DNA OR RNA); SEVERE ACUTE RESPIRATORY SYNDROME CORONAVIRUS 2 (SARS-COV-2) (CORONAVIRUS DISEASE [COVID-19]), AMPLIFIED PROBE TECHNIQUE, MAKING USE OF HIGH THROUGHPUT TECHNOLOGIES AS DESCRIBED BY CMS-2020-01-R: HCPCS | Performed by: INTERNAL MEDICINE

## 2022-01-13 LAB
SARS-COV-2 RNA RESP QL NAA+PROBE: NOT DETECTED
SARS-COV-2- CYCLE NUMBER: NORMAL

## 2022-01-14 ENCOUNTER — HOSPITAL ENCOUNTER (OUTPATIENT)
Dept: RADIOLOGY | Facility: HOSPITAL | Age: 47
Discharge: HOME | End: 2022-01-14
Attending: NURSE PRACTITIONER
Payer: COMMERCIAL

## 2022-01-14 DIAGNOSIS — Z12.31 OTHER SCREENING MAMMOGRAM: ICD-10-CM

## 2022-01-14 PROCEDURE — 77063 BREAST TOMOSYNTHESIS BI: CPT | Mod: TC,PO

## 2022-01-31 ENCOUNTER — IMMUNIZATION (OUTPATIENT)
Dept: PRIMARY CARE CLINIC | Facility: CLINIC | Age: 47
End: 2022-01-31
Payer: COMMERCIAL

## 2022-01-31 DIAGNOSIS — Z23 NEED FOR VACCINATION: Primary | ICD-10-CM

## 2022-01-31 PROCEDURE — 91306 COVID-19, MRNA, LNP-S, PF, 100 MCG/0.25 ML DOSE VACCINE (MODERNA BOOSTER): CPT | Mod: S$GLB,,, | Performed by: FAMILY MEDICINE

## 2022-01-31 PROCEDURE — 0064A COVID-19, MRNA, LNP-S, PF, 100 MCG/0.25 ML DOSE VACCINE (MODERNA BOOSTER): ICD-10-PCS | Mod: S$GLB,,, | Performed by: FAMILY MEDICINE

## 2022-01-31 PROCEDURE — 91306 COVID-19, MRNA, LNP-S, PF, 100 MCG/0.25 ML DOSE VACCINE (MODERNA BOOSTER): ICD-10-PCS | Mod: S$GLB,,, | Performed by: FAMILY MEDICINE

## 2022-01-31 PROCEDURE — 0064A COVID-19, MRNA, LNP-S, PF, 100 MCG/0.25 ML DOSE VACCINE (MODERNA BOOSTER): CPT | Mod: S$GLB,,, | Performed by: FAMILY MEDICINE

## 2022-06-09 ENCOUNTER — TELEPHONE (OUTPATIENT)
Dept: FAMILY MEDICINE | Facility: CLINIC | Age: 47
End: 2022-06-09

## 2022-06-09 DIAGNOSIS — E78.5 HYPERLIPIDEMIA, UNSPECIFIED HYPERLIPIDEMIA TYPE: ICD-10-CM

## 2022-06-09 DIAGNOSIS — Z79.899 HIGH RISK MEDICATION USE: Primary | ICD-10-CM

## 2022-06-09 DIAGNOSIS — I10 ESSENTIAL HYPERTENSION: ICD-10-CM

## 2022-06-22 ENCOUNTER — OFFICE VISIT (OUTPATIENT)
Dept: FAMILY MEDICINE | Facility: CLINIC | Age: 47
End: 2022-06-22
Payer: COMMERCIAL

## 2022-06-22 VITALS
SYSTOLIC BLOOD PRESSURE: 118 MMHG | WEIGHT: 209 LBS | BODY MASS INDEX: 41.03 KG/M2 | HEART RATE: 80 BPM | DIASTOLIC BLOOD PRESSURE: 90 MMHG | HEIGHT: 60 IN

## 2022-06-22 DIAGNOSIS — Z79.899 HIGH RISK MEDICATION USE: ICD-10-CM

## 2022-06-22 DIAGNOSIS — E66.9 OBESITY, UNSPECIFIED CLASSIFICATION, UNSPECIFIED OBESITY TYPE, UNSPECIFIED WHETHER SERIOUS COMORBIDITY PRESENT: ICD-10-CM

## 2022-06-22 DIAGNOSIS — Z00.00 PHYSICAL EXAM: ICD-10-CM

## 2022-06-22 DIAGNOSIS — I10 HYPERTENSION, UNSPECIFIED TYPE: ICD-10-CM

## 2022-06-22 DIAGNOSIS — F32.A DEPRESSION, UNSPECIFIED DEPRESSION TYPE: ICD-10-CM

## 2022-06-22 DIAGNOSIS — Z12.11 SPECIAL SCREENING FOR MALIGNANT NEOPLASMS, COLON: Primary | ICD-10-CM

## 2022-06-22 PROCEDURE — 99214 OFFICE O/P EST MOD 30 MIN: CPT | Mod: S$GLB,,, | Performed by: NURSE PRACTITIONER

## 2022-06-22 PROCEDURE — 1159F MED LIST DOCD IN RCRD: CPT | Mod: CPTII,S$GLB,, | Performed by: NURSE PRACTITIONER

## 2022-06-22 PROCEDURE — 3008F BODY MASS INDEX DOCD: CPT | Mod: CPTII,S$GLB,, | Performed by: NURSE PRACTITIONER

## 2022-06-22 PROCEDURE — 1159F PR MEDICATION LIST DOCUMENTED IN MEDICAL RECORD: ICD-10-PCS | Mod: CPTII,S$GLB,, | Performed by: NURSE PRACTITIONER

## 2022-06-22 PROCEDURE — 3008F PR BODY MASS INDEX (BMI) DOCUMENTED: ICD-10-PCS | Mod: CPTII,S$GLB,, | Performed by: NURSE PRACTITIONER

## 2022-06-22 PROCEDURE — 4010F ACE/ARB THERAPY RXD/TAKEN: CPT | Mod: CPTII,S$GLB,, | Performed by: NURSE PRACTITIONER

## 2022-06-22 PROCEDURE — 4010F PR ACE/ARB THEARPY RXD/TAKEN: ICD-10-PCS | Mod: CPTII,S$GLB,, | Performed by: NURSE PRACTITIONER

## 2022-06-22 PROCEDURE — 3074F PR MOST RECENT SYSTOLIC BLOOD PRESSURE < 130 MM HG: ICD-10-PCS | Mod: CPTII,S$GLB,, | Performed by: NURSE PRACTITIONER

## 2022-06-22 PROCEDURE — 3074F SYST BP LT 130 MM HG: CPT | Mod: CPTII,S$GLB,, | Performed by: NURSE PRACTITIONER

## 2022-06-22 PROCEDURE — 3080F DIAST BP >= 90 MM HG: CPT | Mod: CPTII,S$GLB,, | Performed by: NURSE PRACTITIONER

## 2022-06-22 PROCEDURE — 3080F PR MOST RECENT DIASTOLIC BLOOD PRESSURE >= 90 MM HG: ICD-10-PCS | Mod: CPTII,S$GLB,, | Performed by: NURSE PRACTITIONER

## 2022-06-22 PROCEDURE — 99214 PR OFFICE/OUTPT VISIT, EST, LEVL IV, 30-39 MIN: ICD-10-PCS | Mod: S$GLB,,, | Performed by: NURSE PRACTITIONER

## 2022-06-22 RX ORDER — LISINOPRIL 10 MG/1
10 TABLET ORAL DAILY
Qty: 90 TABLET | Refills: 1 | Status: SHIPPED | OUTPATIENT
Start: 2022-06-22 | End: 2022-12-22 | Stop reason: SDUPTHER

## 2022-06-22 RX ORDER — ESCITALOPRAM OXALATE 20 MG/1
20 TABLET ORAL DAILY
Qty: 90 TABLET | Refills: 1 | Status: SHIPPED | OUTPATIENT
Start: 2022-06-22 | End: 2022-12-22 | Stop reason: SDUPTHER

## 2022-06-22 NOTE — PROGRESS NOTES
SUBJECTIVE:    Patient ID: Zelda Acevedo is a 46 y.o. female.    Chief Complaint: Hypertension (Brought bottles//needs colonoscopy//bs) and Hyperlipidemia    46 YEAR OLD FEMALE PRESENTS FOR CHECK UP.  TREATED FOR HYPERTENSION , HYPERLIPIDEMIA,  AND DEPRESSION. TAKING LISINOPRIL DAILY.  BP IN OFFICE TODAY /90. DOES NOT CHECK AT HOME. LEXAPRO IS CONTROLLING SYMPTOMS. SHE FORGOT TO DO LABS. BUT PLANS TO HAVE DONE TODAY. WOULD LIKE TO HAVE COLOGUARD TEST. NO CURRENT ISSUES.       Lab Visit on 01/12/2022   Component Date Value Ref Range Status    SARS-CoV2 (COVID-19) Qualitative P* 01/12/2022 Not Detected  Not Detected Final    SARS-COV-2- Cycle Number 01/12/2022 N/A   Final       History reviewed. No pertinent past medical history.  Social History     Socioeconomic History    Marital status:    Tobacco Use    Smoking status: Never Smoker    Smokeless tobacco: Never Used   Substance and Sexual Activity    Alcohol use: Yes     Alcohol/week: 1.0 standard drink     Types: 1 Standard drinks or equivalent per week     Comment: This may only be one to two times a month    Drug use: Never    Sexual activity: Not Currently     Partners: Male     Birth control/protection: Abstinence     Social Determinants of Health     Financial Resource Strain: Low Risk     Difficulty of Paying Living Expenses: Not hard at all   Food Insecurity: No Food Insecurity    Worried About Running Out of Food in the Last Year: Never true    Ran Out of Food in the Last Year: Never true   Transportation Needs: No Transportation Needs    Lack of Transportation (Medical): No    Lack of Transportation (Non-Medical): No   Physical Activity: Inactive    Days of Exercise per Week: 0 days    Minutes of Exercise per Session: 0 min   Stress: No Stress Concern Present    Feeling of Stress : Only a little   Social Connections: Unknown    Frequency of Communication with Friends and Family: Once a week    Frequency of Social Gatherings  with Friends and Family: Once a week    Active Member of Clubs or Organizations: No    Attends Club or Organization Meetings: Never    Marital Status:    Housing Stability: Low Risk     Unable to Pay for Housing in the Last Year: No    Number of Places Lived in the Last Year: 1    Unstable Housing in the Last Year: No     Past Surgical History:   Procedure Laterality Date     SECTION       SECTION      TONSILLECTOMY       Family History   Problem Relation Age of Onset    Arthritis Mother     Arthritis Father     Cancer Father         Melnoma cancer of the eye    Hearing loss Father     Cancer Sister         Skin cancer       Review of patient's allergies indicates:  No Known Allergies    Current Outpatient Medications:     cetirizine (ZYRTEC) 10 MG tablet, Take 10 mg by mouth once daily., Disp: , Rfl:     EScitalopram oxalate (LEXAPRO) 20 MG tablet, Take 1 tablet (20 mg total) by mouth once daily., Disp: 90 tablet, Rfl: 1    lisinopriL 10 MG tablet, Take 1 tablet (10 mg total) by mouth once daily., Disp: 90 tablet, Rfl: 1    multivitamin/iron/folic acid (CENTRUM WOMEN ORAL), Take by mouth., Disp: , Rfl:     rosuvastatin (CRESTOR) 20 MG tablet, Take 1 tablet (20 mg total) by mouth once daily., Disp: 90 tablet, Rfl: 3    Review of Systems   Constitutional: Negative for chills, fever and unexpected weight change.   HENT: Negative for ear pain, rhinorrhea and sore throat.    Eyes: Negative for pain and visual disturbance.   Respiratory: Negative for cough and shortness of breath.    Cardiovascular: Negative for chest pain, palpitations and leg swelling.   Gastrointestinal: Negative for abdominal pain, diarrhea, nausea and vomiting.   Genitourinary: Negative for difficulty urinating, hematuria and vaginal bleeding.   Musculoskeletal: Negative for arthralgias.   Skin: Negative for rash.   Neurological: Negative for dizziness, weakness and headaches.   Psychiatric/Behavioral:  "Negative for agitation and sleep disturbance. The patient is not nervous/anxious.           Objective:      Vitals:    06/22/22 0829 06/22/22 0835 06/22/22 1631   BP: (!) 118/90 (!) 118/92 (!) 118/90   Pulse: 80     Weight: 94.8 kg (209 lb)     Height: 5' 0.25" (1.53 m)       Physical Exam  Constitutional:       General: She is not in acute distress.     Appearance: She is well-developed and normal weight. She is not ill-appearing.   HENT:      Right Ear: External ear normal.      Left Ear: External ear normal.   Eyes:      Conjunctiva/sclera: Conjunctivae normal.      Pupils: Pupils are equal, round, and reactive to light.   Neck:      Vascular: No JVD.   Cardiovascular:      Rate and Rhythm: Normal rate and regular rhythm.      Heart sounds: No murmur heard.  Pulmonary:      Effort: Pulmonary effort is normal.      Breath sounds: Normal breath sounds.   Abdominal:      General: Bowel sounds are normal.      Palpations: Abdomen is soft.   Musculoskeletal:         General: No deformity. Normal range of motion.      Cervical back: Normal range of motion and neck supple.   Lymphadenopathy:      Cervical: No cervical adenopathy.   Skin:     General: Skin is warm and dry.      Findings: No rash.   Neurological:      Mental Status: She is alert and oriented to person, place, and time.      Gait: Gait normal.   Psychiatric:         Speech: Speech normal.         Behavior: Behavior normal.           Assessment:       1. Special screening for malignant neoplasms, colon    2. Depression, unspecified depression type    3. Hypertension, unspecified type    4. Obesity, unspecified classification, unspecified obesity type, unspecified whether serious comorbidity present    5. High risk medication use    6. Physical exam         Plan:       Special screening for malignant neoplasms, colon  -     Cologuard Screening (Multitarget Stool DNA); Future; Expected date: 06/22/2022    Depression, unspecified depression type  -     " EScitalopram oxalate (LEXAPRO) 20 MG tablet; Take 1 tablet (20 mg total) by mouth once daily.  Dispense: 90 tablet; Refill: 1    Hypertension, unspecified type  -     lisinopriL 10 MG tablet; Take 1 tablet (10 mg total) by mouth once daily.  Dispense: 90 tablet; Refill: 1    Obesity, unspecified classification, unspecified obesity type, unspecified whether serious comorbidity present    High risk medication use    Physical exam      Follow up in about 6 months (around 12/22/2022), or if symptoms worsen or fail to improve, for medication management.        6/22/2022 Renetta Bunch

## 2022-06-23 LAB
ALBUMIN SERPL-MCNC: 4.3 G/DL (ref 3.6–5.1)
ALBUMIN/CREAT UR: 5 MCG/MG CREAT
ALBUMIN/GLOB SERPL: 1.7 (CALC) (ref 1–2.5)
ALP SERPL-CCNC: 60 U/L (ref 31–125)
ALT SERPL-CCNC: 26 U/L (ref 6–29)
APPEARANCE UR: CLEAR
AST SERPL-CCNC: 21 U/L (ref 10–35)
BACTERIA #/AREA URNS HPF: ABNORMAL /HPF
BACTERIA UR CULT: ABNORMAL
BASOPHILS # BLD AUTO: 87 CELLS/UL (ref 0–200)
BASOPHILS NFR BLD AUTO: 1.3 %
BILIRUB SERPL-MCNC: 0.4 MG/DL (ref 0.2–1.2)
BILIRUB UR QL STRIP: NEGATIVE
BUN SERPL-MCNC: 17 MG/DL (ref 7–25)
BUN/CREAT SERPL: NORMAL (CALC) (ref 6–22)
CALCIUM SERPL-MCNC: 9.4 MG/DL (ref 8.6–10.2)
CHLORIDE SERPL-SCNC: 107 MMOL/L (ref 98–110)
CHOLEST SERPL-MCNC: 165 MG/DL
CHOLEST/HDLC SERPL: 3.4 (CALC)
CO2 SERPL-SCNC: 25 MMOL/L (ref 20–32)
COLOR UR: YELLOW
CREAT SERPL-MCNC: 0.72 MG/DL (ref 0.5–1.1)
CREAT UR-MCNC: 185 MG/DL (ref 20–275)
EOSINOPHIL # BLD AUTO: 462 CELLS/UL (ref 15–500)
EOSINOPHIL NFR BLD AUTO: 6.9 %
ERYTHROCYTE [DISTWIDTH] IN BLOOD BY AUTOMATED COUNT: 12.9 % (ref 11–15)
GLOBULIN SER CALC-MCNC: 2.6 G/DL (CALC) (ref 1.9–3.7)
GLUCOSE SERPL-MCNC: 96 MG/DL (ref 65–99)
GLUCOSE UR QL STRIP: NEGATIVE
HCT VFR BLD AUTO: 37.6 % (ref 35–45)
HDLC SERPL-MCNC: 48 MG/DL
HGB BLD-MCNC: 12.3 G/DL (ref 11.7–15.5)
HGB UR QL STRIP: NEGATIVE
HYALINE CASTS #/AREA URNS LPF: ABNORMAL /LPF
KETONES UR QL STRIP: NEGATIVE
LDLC SERPL CALC-MCNC: 93 MG/DL (CALC)
LEUKOCYTE ESTERASE UR QL STRIP: NEGATIVE
LYMPHOCYTES # BLD AUTO: 2191 CELLS/UL (ref 850–3900)
LYMPHOCYTES NFR BLD AUTO: 32.7 %
MCH RBC QN AUTO: 27.5 PG (ref 27–33)
MCHC RBC AUTO-ENTMCNC: 32.7 G/DL (ref 32–36)
MCV RBC AUTO: 83.9 FL (ref 80–100)
MICROALBUMIN UR-MCNC: 0.9 MG/DL
MONOCYTES # BLD AUTO: 549 CELLS/UL (ref 200–950)
MONOCYTES NFR BLD AUTO: 8.2 %
NEUTROPHILS # BLD AUTO: 3410 CELLS/UL (ref 1500–7800)
NEUTROPHILS NFR BLD AUTO: 50.9 %
NITRITE UR QL STRIP: NEGATIVE
NONHDLC SERPL-MCNC: 117 MG/DL (CALC)
PH UR STRIP: ABNORMAL [PH] (ref 5–8)
PLATELET # BLD AUTO: 291 THOUSAND/UL (ref 140–400)
PMV BLD REES-ECKER: 9.1 FL (ref 7.5–12.5)
POTASSIUM SERPL-SCNC: 4.1 MMOL/L (ref 3.5–5.3)
PROT SERPL-MCNC: 6.9 G/DL (ref 6.1–8.1)
PROT UR QL STRIP: NEGATIVE
RBC # BLD AUTO: 4.48 MILLION/UL (ref 3.8–5.1)
RBC #/AREA URNS HPF: ABNORMAL /HPF
SERVICE CMNT-IMP: ABNORMAL
SODIUM SERPL-SCNC: 140 MMOL/L (ref 135–146)
SP GR UR STRIP: 1.03 (ref 1–1.03)
SQUAMOUS #/AREA URNS HPF: ABNORMAL /HPF
TRIGL SERPL-MCNC: 138 MG/DL
TSH SERPL-ACNC: 3.13 MIU/L
WBC # BLD AUTO: 6.7 THOUSAND/UL (ref 3.8–10.8)
WBC #/AREA URNS HPF: ABNORMAL /HPF

## 2022-07-09 LAB — NONINV COLON CA DNA+OCC BLD SCRN STL QL: NEGATIVE

## 2022-07-12 ENCOUNTER — TELEPHONE (OUTPATIENT)
Dept: FAMILY MEDICINE | Facility: CLINIC | Age: 47
End: 2022-07-12

## 2022-07-12 NOTE — TELEPHONE ENCOUNTER
----- Message from Renetta Bunch NP sent at 7/11/2022  3:08 PM CDT -----  Healthy cholesterol has slightly decreased. Increase exercise. The rest of your labs look great. Continue as is.          Negative cologuard   Written by Renetta Bunch NP on 7/11/2022

## 2022-10-26 ENCOUNTER — HOSPITAL ENCOUNTER (OUTPATIENT)
Dept: RADIOLOGY | Facility: HOSPITAL | Age: 47
Discharge: HOME | End: 2022-10-26
Attending: NURSE PRACTITIONER
Payer: COMMERCIAL

## 2022-10-26 ENCOUNTER — OFFICE VISIT (OUTPATIENT)
Dept: FAMILY MEDICINE | Facility: CLINIC | Age: 47
End: 2022-10-26
Payer: COMMERCIAL

## 2022-10-26 VITALS
DIASTOLIC BLOOD PRESSURE: 82 MMHG | SYSTOLIC BLOOD PRESSURE: 134 MMHG | BODY MASS INDEX: 40.84 KG/M2 | HEART RATE: 68 BPM | HEIGHT: 60 IN | WEIGHT: 208 LBS

## 2022-10-26 DIAGNOSIS — M25.512 ACUTE PAIN OF LEFT SHOULDER: ICD-10-CM
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
## 2022-10-26 DIAGNOSIS — I10 ESSENTIAL HYPERTENSION: ICD-10-CM
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
## 2022-10-26 PROCEDURE — 99213 OFFICE O/P EST LOW 20 MIN: CPT | Mod: S$GLB,,, | Performed by: NURSE PRACTITIONER

## 2022-10-26 PROCEDURE — 3061F NEG MICROALBUMINURIA REV: CPT | Mod: CPTII,S$GLB,, | Performed by: NURSE PRACTITIONER

## 2022-10-26 PROCEDURE — 3079F PR MOST RECENT DIASTOLIC BLOOD PRESSURE 80-89 MM HG: ICD-10-PCS | Mod: CPTII,S$GLB,, | Performed by: NURSE PRACTITIONER

## 2022-10-26 PROCEDURE — 3008F PR BODY MASS INDEX (BMI) DOCUMENTED: ICD-10-PCS | Mod: CPTII,S$GLB,, | Performed by: NURSE PRACTITIONER

## 2022-10-26 PROCEDURE — 73030 X-RAY EXAM OF SHOULDER: CPT | Mod: TC,PO,LT

## 2022-10-26 PROCEDURE — 3066F NEPHROPATHY DOC TX: CPT | Mod: CPTII,S$GLB,, | Performed by: NURSE PRACTITIONER

## 2022-10-26 PROCEDURE — 3075F PR MOST RECENT SYSTOLIC BLOOD PRESS GE 130-139MM HG: ICD-10-PCS | Mod: CPTII,S$GLB,, | Performed by: NURSE PRACTITIONER

## 2022-10-26 PROCEDURE — 3079F DIAST BP 80-89 MM HG: CPT | Mod: CPTII,S$GLB,, | Performed by: NURSE PRACTITIONER

## 2022-10-26 PROCEDURE — 1160F PR REVIEW ALL MEDS BY PRESCRIBER/CLIN PHARMACIST DOCUMENTED: ICD-10-PCS | Mod: CPTII,S$GLB,, | Performed by: NURSE PRACTITIONER

## 2022-10-26 PROCEDURE — 1159F MED LIST DOCD IN RCRD: CPT | Mod: CPTII,S$GLB,, | Performed by: NURSE PRACTITIONER

## 2022-10-26 PROCEDURE — 1159F PR MEDICATION LIST DOCUMENTED IN MEDICAL RECORD: ICD-10-PCS | Mod: CPTII,S$GLB,, | Performed by: NURSE PRACTITIONER

## 2022-10-26 PROCEDURE — 1160F RVW MEDS BY RX/DR IN RCRD: CPT | Mod: CPTII,S$GLB,, | Performed by: NURSE PRACTITIONER

## 2022-10-26 PROCEDURE — 3075F SYST BP GE 130 - 139MM HG: CPT | Mod: CPTII,S$GLB,, | Performed by: NURSE PRACTITIONER

## 2022-10-26 PROCEDURE — 3066F PR DOCUMENTATION OF TREATMENT FOR NEPHROPATHY: ICD-10-PCS | Mod: CPTII,S$GLB,, | Performed by: NURSE PRACTITIONER

## 2022-10-26 PROCEDURE — 4010F PR ACE/ARB THEARPY RXD/TAKEN: ICD-10-PCS | Mod: CPTII,S$GLB,, | Performed by: NURSE PRACTITIONER

## 2022-10-26 PROCEDURE — 3061F PR NEG MICROALBUMINURIA RESULT DOCUMENTED/REVIEW: ICD-10-PCS | Mod: CPTII,S$GLB,, | Performed by: NURSE PRACTITIONER

## 2022-10-26 PROCEDURE — 4010F ACE/ARB THERAPY RXD/TAKEN: CPT | Mod: CPTII,S$GLB,, | Performed by: NURSE PRACTITIONER

## 2022-10-26 PROCEDURE — 3008F BODY MASS INDEX DOCD: CPT | Mod: CPTII,S$GLB,, | Performed by: NURSE PRACTITIONER

## 2022-10-26 PROCEDURE — 99213 PR OFFICE/OUTPT VISIT, EST, LEVL III, 20-29 MIN: ICD-10-PCS | Mod: S$GLB,,, | Performed by: NURSE PRACTITIONER

## 2022-10-26 NOTE — PROGRESS NOTES
SUBJECTIVE:    Patient ID: Zelda Acevedo is a 47 y.o. female.    Chief Complaint: Shoulder Pain (Left, fell on arm of chair at work, has not taken anything OTC, brought bottles// SW)    47 YEAR OLD FEMALE PRESENTS FOR urgent visit. She is reporting that she fell on arm chair at work. Pain is not getting better. Has tried otc meds with no improvement. No swelling. No deformity. Pain is worse with moving.  TREATED FOR HYPERTENSION , HYPERLIPIDEMIA,  AND DEPRESSION. T    Office Visit on 06/22/2022   Component Date Value Ref Range Status    Cologuard Result 07/05/2022 Negative  Negative Final   Telephone on 06/09/2022   Component Date Value Ref Range Status    WBC 06/22/2022 6.7  3.8 - 10.8 Thousand/uL Final    RBC 06/22/2022 4.48  3.80 - 5.10 Million/uL Final    Hemoglobin 06/22/2022 12.3  11.7 - 15.5 g/dL Final    Hematocrit 06/22/2022 37.6  35.0 - 45.0 % Final    MCV 06/22/2022 83.9  80.0 - 100.0 fL Final    MCH 06/22/2022 27.5  27.0 - 33.0 pg Final    MCHC 06/22/2022 32.7  32.0 - 36.0 g/dL Final    RDW 06/22/2022 12.9  11.0 - 15.0 % Final    Platelets 06/22/2022 291  140 - 400 Thousand/uL Final    MPV 06/22/2022 9.1  7.5 - 12.5 fL Final    Neutrophils, Abs 06/22/2022 3,410  1,500 - 7,800 cells/uL Final    Lymph # 06/22/2022 2,191  850 - 3,900 cells/uL Final    Mono # 06/22/2022 549  200 - 950 cells/uL Final    Eos # 06/22/2022 462  15 - 500 cells/uL Final    Baso # 06/22/2022 87  0 - 200 cells/uL Final    Neutrophils Relative 06/22/2022 50.9  % Final    Lymph % 06/22/2022 32.7  % Final    Mono % 06/22/2022 8.2  % Final    Eosinophil % 06/22/2022 6.9  % Final    Basophil % 06/22/2022 1.3  % Final    Glucose 06/22/2022 96  65 - 99 mg/dL Final    BUN 06/22/2022 17  7 - 25 mg/dL Final    Creatinine 06/22/2022 0.72  0.50 - 1.10 mg/dL Final    eGFR if non African American 06/22/2022 101  > OR = 60 mL/min/1.73m2 Final    eGFR if African American 06/22/2022 117  > OR = 60 mL/min/1.73m2 Final    BUN/Creatinine Ratio  06/22/2022 NOT APPLICABLE  6 - 22 (calc) Final    Sodium 06/22/2022 140  135 - 146 mmol/L Final    Potassium 06/22/2022 4.1  3.5 - 5.3 mmol/L Final    Chloride 06/22/2022 107  98 - 110 mmol/L Final    CO2 06/22/2022 25  20 - 32 mmol/L Final    Calcium 06/22/2022 9.4  8.6 - 10.2 mg/dL Final    Total Protein 06/22/2022 6.9  6.1 - 8.1 g/dL Final    Albumin 06/22/2022 4.3  3.6 - 5.1 g/dL Final    Globulin, Total 06/22/2022 2.6  1.9 - 3.7 g/dL (calc) Final    Albumin/Globulin Ratio 06/22/2022 1.7  1.0 - 2.5 (calc) Final    Total Bilirubin 06/22/2022 0.4  0.2 - 1.2 mg/dL Final    Alkaline Phosphatase 06/22/2022 60  31 - 125 U/L Final    AST 06/22/2022 21  10 - 35 U/L Final    ALT 06/22/2022 26  6 - 29 U/L Final    Creatinine, Urine 06/22/2022 185  20 - 275 mg/dL Final    Microalb, Ur 06/22/2022 0.9  See Note: mg/dL Final    Microalb/Creat Ratio 06/22/2022 5  <30 mcg/mg creat Final    Cholesterol 06/22/2022 165  <200 mg/dL Final    HDL 06/22/2022 48 (L)  > OR = 50 mg/dL Final    Triglycerides 06/22/2022 138  <150 mg/dL Final    LDL Cholesterol 06/22/2022 93  mg/dL (calc) Final    HDL/Cholesterol Ratio 06/22/2022 3.4  <5.0 (calc) Final    Non HDL Chol. (LDL+VLDL) 06/22/2022 117  <130 mg/dL (calc) Final    Color, UA 06/22/2022 YELLOW  YELLOW Final    Appearance, UA 06/22/2022 CLEAR  CLEAR Final    Specific Gravity, UA 06/22/2022 1.029  1.001 - 1.035 Final    pH, UA 06/22/2022 < OR = 5.0  5.0 - 8.0 Final    Glucose, UA 06/22/2022 NEGATIVE  NEGATIVE Final    Bilirubin, UA 06/22/2022 NEGATIVE  NEGATIVE Final    Ketones, UA 06/22/2022 NEGATIVE  NEGATIVE Final    Occult Blood UA 06/22/2022 NEGATIVE  NEGATIVE Final    Protein, UA 06/22/2022 NEGATIVE  NEGATIVE Final    Nitrite, UA 06/22/2022 NEGATIVE  NEGATIVE Final    Leukocytes, UA 06/22/2022 NEGATIVE  NEGATIVE Final    WBC Casts, UA 06/22/2022 NONE SEEN  < OR = 5 /HPF Final    RBC Casts, UA 06/22/2022 0-2  < OR = 2 /HPF Final    Squam Epithel, UA 06/22/2022 6-10 (A)  < OR = 5  /HPF Final    Bacteria, UA 2022 FEW (A)  NONE SEEN /HPF Final    Hyaline Casts, UA 2022 NONE SEEN  NONE SEEN /LPF Final    Service Cmt: 2022    Final    Reflexive Urine Culture 2022    Final    TSH w/reflex to FT4 2022 3.13  mIU/L Final       History reviewed. No pertinent past medical history.  Social History     Socioeconomic History    Marital status:    Tobacco Use    Smoking status: Never    Smokeless tobacco: Never   Substance and Sexual Activity    Alcohol use: Yes     Alcohol/week: 1.0 standard drink     Types: 1 Standard drinks or equivalent per week     Comment: This may only be one to two times a month    Drug use: Never    Sexual activity: Not Currently     Partners: Male     Birth control/protection: Abstinence     Social Determinants of Health     Financial Resource Strain: Low Risk     Difficulty of Paying Living Expenses: Not hard at all   Food Insecurity: No Food Insecurity    Worried About Running Out of Food in the Last Year: Never true    Ran Out of Food in the Last Year: Never true   Transportation Needs: No Transportation Needs    Lack of Transportation (Medical): No    Lack of Transportation (Non-Medical): No   Physical Activity: Inactive    Days of Exercise per Week: 0 days    Minutes of Exercise per Session: 0 min   Stress: Stress Concern Present    Feeling of Stress : To some extent   Social Connections: Unknown    Frequency of Communication with Friends and Family: More than three times a week    Frequency of Social Gatherings with Friends and Family: Once a week    Active Member of Clubs or Organizations: No    Attends Club or Organization Meetings: Never    Marital Status:    Housing Stability: Low Risk     Unable to Pay for Housing in the Last Year: No    Number of Places Lived in the Last Year: 1    Unstable Housing in the Last Year: No     Past Surgical History:   Procedure Laterality Date     SECTION       SECTION       "TONSILLECTOMY       Family History   Problem Relation Age of Onset    Arthritis Mother     Arthritis Father     Cancer Father         Melnoma cancer of the eye    Hearing loss Father     Cancer Sister         Skin cancer       Review of patient's allergies indicates:  No Known Allergies    Current Outpatient Medications:     cetirizine (ZYRTEC) 10 MG tablet, Take 10 mg by mouth once daily., Disp: , Rfl:     EScitalopram oxalate (LEXAPRO) 20 MG tablet, Take 1 tablet (20 mg total) by mouth once daily., Disp: 90 tablet, Rfl: 1    lisinopriL 10 MG tablet, Take 1 tablet (10 mg total) by mouth once daily., Disp: 90 tablet, Rfl: 1    multivitamin/iron/folic acid (CENTRUM WOMEN ORAL), Take by mouth., Disp: , Rfl:     rosuvastatin (CRESTOR) 20 MG tablet, Take 1 tablet (20 mg total) by mouth once daily., Disp: 90 tablet, Rfl: 3    Review of Systems   Constitutional:  Negative for activity change, chills and fever.   Respiratory:  Negative for cough and shortness of breath.    Cardiovascular:  Negative for chest pain.   Gastrointestinal:  Negative for abdominal pain, diarrhea, nausea and vomiting.   Musculoskeletal:  Negative for back pain and gait problem.        Shoulder pain   Neurological:  Negative for dizziness and weakness.        Objective:      Vitals:    10/26/22 0844   BP: 134/82   Pulse: 68   Weight: 94.3 kg (208 lb)   Height: 5' 0.25" (1.53 m)     Physical Exam  Vitals and nursing note reviewed.   Constitutional:       General: She is not in acute distress.     Appearance: Normal appearance. She is well-developed.   Cardiovascular:      Rate and Rhythm: Normal rate and regular rhythm.      Heart sounds: No murmur heard.    No friction rub. No gallop.   Pulmonary:      Breath sounds: Normal breath sounds. No wheezing or rales.   Musculoskeletal:      Left shoulder: Tenderness present. No swelling or deformity. Decreased range of motion.      Lumbar back: No spasms, tenderness or bony tenderness. Normal range of " motion.      Comments: Negative bilat SLR   Skin:     Findings: No rash.   Neurological:      Mental Status: She is alert and oriented to person, place, and time.      Sensory: No sensory deficit.      Gait: Gait normal.         Assessment:       1. Fall, initial encounter    2. Acute pain of left shoulder    3. Essential hypertension         Plan:       Fall, initial encounter  -     X-Ray Shoulder 2 or More Views Left; Future; Expected date: 10/26/2022    Acute pain of left shoulder  -     X-Ray Shoulder 2 or More Views Left; Future; Expected date: 10/26/2022    Essential hypertension    Follow up if symptoms worsen or fail to improve, for medication management.        11/10/2022 Renetta Bunch

## 2022-11-11 ENCOUNTER — PATIENT MESSAGE (OUTPATIENT)
Dept: FAMILY MEDICINE | Facility: CLINIC | Age: 47
End: 2022-11-11

## 2022-12-22 ENCOUNTER — OFFICE VISIT (OUTPATIENT)
Dept: FAMILY MEDICINE | Facility: CLINIC | Age: 47
End: 2022-12-22
Payer: COMMERCIAL

## 2022-12-22 VITALS
HEART RATE: 72 BPM | BODY MASS INDEX: 41.23 KG/M2 | DIASTOLIC BLOOD PRESSURE: 84 MMHG | HEIGHT: 60 IN | WEIGHT: 210 LBS | SYSTOLIC BLOOD PRESSURE: 134 MMHG

## 2022-12-22 DIAGNOSIS — F32.A DEPRESSION, UNSPECIFIED DEPRESSION TYPE: ICD-10-CM

## 2022-12-22 DIAGNOSIS — F41.9 ANXIETY: ICD-10-CM

## 2022-12-22 DIAGNOSIS — Z12.31 ENCOUNTER FOR SCREENING MAMMOGRAM FOR MALIGNANT NEOPLASM OF BREAST: Primary | ICD-10-CM

## 2022-12-22 DIAGNOSIS — Z79.899 HIGH RISK MEDICATION USE: ICD-10-CM

## 2022-12-22 DIAGNOSIS — E66.9 OBESITY, UNSPECIFIED CLASSIFICATION, UNSPECIFIED OBESITY TYPE, UNSPECIFIED WHETHER SERIOUS COMORBIDITY PRESENT: ICD-10-CM

## 2022-12-22 DIAGNOSIS — I10 HYPERTENSION, UNSPECIFIED TYPE: ICD-10-CM

## 2022-12-22 PROCEDURE — 1159F PR MEDICATION LIST DOCUMENTED IN MEDICAL RECORD: ICD-10-PCS | Mod: CPTII,S$GLB,, | Performed by: NURSE PRACTITIONER

## 2022-12-22 PROCEDURE — 3066F PR DOCUMENTATION OF TREATMENT FOR NEPHROPATHY: ICD-10-PCS | Mod: CPTII,S$GLB,, | Performed by: NURSE PRACTITIONER

## 2022-12-22 PROCEDURE — 3079F DIAST BP 80-89 MM HG: CPT | Mod: CPTII,S$GLB,, | Performed by: NURSE PRACTITIONER

## 2022-12-22 PROCEDURE — 1159F MED LIST DOCD IN RCRD: CPT | Mod: CPTII,S$GLB,, | Performed by: NURSE PRACTITIONER

## 2022-12-22 PROCEDURE — 3075F SYST BP GE 130 - 139MM HG: CPT | Mod: CPTII,S$GLB,, | Performed by: NURSE PRACTITIONER

## 2022-12-22 PROCEDURE — 99214 OFFICE O/P EST MOD 30 MIN: CPT | Mod: S$GLB,,, | Performed by: NURSE PRACTITIONER

## 2022-12-22 PROCEDURE — 3066F NEPHROPATHY DOC TX: CPT | Mod: CPTII,S$GLB,, | Performed by: NURSE PRACTITIONER

## 2022-12-22 PROCEDURE — 1160F RVW MEDS BY RX/DR IN RCRD: CPT | Mod: CPTII,S$GLB,, | Performed by: NURSE PRACTITIONER

## 2022-12-22 PROCEDURE — 3075F PR MOST RECENT SYSTOLIC BLOOD PRESS GE 130-139MM HG: ICD-10-PCS | Mod: CPTII,S$GLB,, | Performed by: NURSE PRACTITIONER

## 2022-12-22 PROCEDURE — 4010F PR ACE/ARB THEARPY RXD/TAKEN: ICD-10-PCS | Mod: CPTII,S$GLB,, | Performed by: NURSE PRACTITIONER

## 2022-12-22 PROCEDURE — 3008F BODY MASS INDEX DOCD: CPT | Mod: CPTII,S$GLB,, | Performed by: NURSE PRACTITIONER

## 2022-12-22 PROCEDURE — 3061F NEG MICROALBUMINURIA REV: CPT | Mod: CPTII,S$GLB,, | Performed by: NURSE PRACTITIONER

## 2022-12-22 PROCEDURE — 3061F PR NEG MICROALBUMINURIA RESULT DOCUMENTED/REVIEW: ICD-10-PCS | Mod: CPTII,S$GLB,, | Performed by: NURSE PRACTITIONER

## 2022-12-22 PROCEDURE — 3008F PR BODY MASS INDEX (BMI) DOCUMENTED: ICD-10-PCS | Mod: CPTII,S$GLB,, | Performed by: NURSE PRACTITIONER

## 2022-12-22 PROCEDURE — 1160F PR REVIEW ALL MEDS BY PRESCRIBER/CLIN PHARMACIST DOCUMENTED: ICD-10-PCS | Mod: CPTII,S$GLB,, | Performed by: NURSE PRACTITIONER

## 2022-12-22 PROCEDURE — 4010F ACE/ARB THERAPY RXD/TAKEN: CPT | Mod: CPTII,S$GLB,, | Performed by: NURSE PRACTITIONER

## 2022-12-22 PROCEDURE — 99214 PR OFFICE/OUTPT VISIT, EST, LEVL IV, 30-39 MIN: ICD-10-PCS | Mod: S$GLB,,, | Performed by: NURSE PRACTITIONER

## 2022-12-22 PROCEDURE — 3079F PR MOST RECENT DIASTOLIC BLOOD PRESSURE 80-89 MM HG: ICD-10-PCS | Mod: CPTII,S$GLB,, | Performed by: NURSE PRACTITIONER

## 2022-12-22 RX ORDER — ROSUVASTATIN CALCIUM 20 MG/1
20 TABLET, COATED ORAL DAILY
Qty: 90 TABLET | Refills: 3 | Status: SHIPPED | OUTPATIENT
Start: 2022-12-22 | End: 2023-06-22 | Stop reason: SDUPTHER

## 2022-12-22 RX ORDER — LISINOPRIL 10 MG/1
10 TABLET ORAL DAILY
Qty: 90 TABLET | Refills: 1 | Status: SHIPPED | OUTPATIENT
Start: 2022-12-22 | End: 2023-06-22 | Stop reason: SDUPTHER

## 2022-12-22 RX ORDER — ESCITALOPRAM OXALATE 20 MG/1
20 TABLET ORAL DAILY
Qty: 90 TABLET | Refills: 1 | Status: SHIPPED | OUTPATIENT
Start: 2022-12-22 | End: 2023-06-22 | Stop reason: SDUPTHER

## 2022-12-22 NOTE — PROGRESS NOTES
SUBJECTIVE:    Patient ID: Zelda Acevedo is a 47 y.o. female.    Chief Complaint: Hypertension (6mth, brought bottles// SW)    47 year oldpresents for check up. Overall doing well. Occasionally has twinge of shoulder pain. Resolves with motrin. Treated for hypertension and hyperlipidemia. Taking meds as prescribed. Last labs were in June. Sleeping ok. Stress is manageable. No complaints.       No visits with results within 6 Month(s) from this visit.   Latest known visit with results is:   Office Visit on 06/22/2022   Component Date Value Ref Range Status    Cologuard Result 07/05/2022 Negative  Negative Final       History reviewed. No pertinent past medical history.  Social History     Socioeconomic History    Marital status:    Tobacco Use    Smoking status: Never    Smokeless tobacco: Never   Substance and Sexual Activity    Alcohol use: Yes     Alcohol/week: 1.0 standard drink     Types: 1 Standard drinks or equivalent per week     Comment: This may only be one to two times a month    Drug use: Never    Sexual activity: Not Currently     Partners: Male     Birth control/protection: Abstinence     Social Determinants of Health     Financial Resource Strain: Low Risk     Difficulty of Paying Living Expenses: Not hard at all   Food Insecurity: No Food Insecurity    Worried About Running Out of Food in the Last Year: Never true    Ran Out of Food in the Last Year: Never true   Transportation Needs: No Transportation Needs    Lack of Transportation (Medical): No    Lack of Transportation (Non-Medical): No   Physical Activity: Inactive    Days of Exercise per Week: 0 days    Minutes of Exercise per Session: 0 min   Stress: No Stress Concern Present    Feeling of Stress : Only a little   Social Connections: Unknown    Frequency of Communication with Friends and Family: Twice a week    Frequency of Social Gatherings with Friends and Family: Once a week    Active Member of Clubs or Organizations: No    Attends  Club or Organization Meetings: Never    Marital Status:    Housing Stability: Low Risk     Unable to Pay for Housing in the Last Year: No    Number of Places Lived in the Last Year: 1    Unstable Housing in the Last Year: No     Past Surgical History:   Procedure Laterality Date     SECTION       SECTION      TONSILLECTOMY       Family History   Problem Relation Age of Onset    Arthritis Mother     Arthritis Father     Cancer Father         Melnoma cancer of the eye    Hearing loss Father     Cancer Sister         Skin cancer       Review of patient's allergies indicates:  No Known Allergies    Current Outpatient Medications:     cetirizine (ZYRTEC) 10 MG tablet, Take 10 mg by mouth once daily., Disp: , Rfl:     multivitamin/iron/folic acid (CENTRUM WOMEN ORAL), Take by mouth., Disp: , Rfl:     EScitalopram oxalate (LEXAPRO) 20 MG tablet, Take 1 tablet (20 mg total) by mouth once daily., Disp: 90 tablet, Rfl: 1    lisinopriL 10 MG tablet, Take 1 tablet (10 mg total) by mouth once daily., Disp: 90 tablet, Rfl: 1    rosuvastatin (CRESTOR) 20 MG tablet, Take 1 tablet (20 mg total) by mouth once daily., Disp: 90 tablet, Rfl: 3    Review of Systems   Constitutional:  Negative for chills, fever and unexpected weight change.   HENT:  Negative for ear pain, rhinorrhea and sore throat.    Eyes:  Negative for pain and visual disturbance.   Respiratory:  Negative for cough and shortness of breath.    Cardiovascular:  Negative for chest pain, palpitations and leg swelling.   Gastrointestinal:  Negative for abdominal pain, diarrhea, nausea and vomiting.   Genitourinary:  Negative for difficulty urinating, hematuria and vaginal bleeding.   Musculoskeletal:  Negative for arthralgias.   Skin:  Negative for rash.   Neurological:  Negative for dizziness, weakness and headaches.   Psychiatric/Behavioral:  Negative for agitation and sleep disturbance. The patient is not nervous/anxious.         Objective:     "  Vitals:    12/22/22 0822   BP: 134/84   Pulse: 72   Weight: 95.3 kg (210 lb)   Height: 5' 0.25" (1.53 m)     Physical Exam  Vitals and nursing note reviewed.   Constitutional:       General: She is not in acute distress.     Appearance: Normal appearance. She is well-developed.   HENT:      Head: Normocephalic.      Right Ear: External ear normal.      Left Ear: External ear normal.   Eyes:      Conjunctiva/sclera: Conjunctivae normal.      Pupils: Pupils are equal, round, and reactive to light.   Neck:      Vascular: No JVD.   Cardiovascular:      Rate and Rhythm: Normal rate and regular rhythm.      Heart sounds: No murmur heard.  Pulmonary:      Effort: Pulmonary effort is normal.      Breath sounds: Normal breath sounds.   Abdominal:      General: Bowel sounds are normal.      Palpations: Abdomen is soft.   Musculoskeletal:         General: No deformity. Normal range of motion.      Cervical back: Normal range of motion and neck supple.   Lymphadenopathy:      Cervical: No cervical adenopathy.   Skin:     General: Skin is warm and dry.      Findings: No rash.   Neurological:      Mental Status: She is alert and oriented to person, place, and time.      Gait: Gait normal.   Psychiatric:         Speech: Speech normal.         Behavior: Behavior normal.         Assessment:       1. Encounter for screening mammogram for malignant neoplasm of breast    2. Depression, unspecified depression type    3. Hypertension, unspecified type    4. Anxiety    5. Obesity, unspecified classification, unspecified obesity type, unspecified whether serious comorbidity present    6. High risk medication use           Plan:       Encounter for screening mammogram for malignant neoplasm of breast  -     Mammo Digital Screening Bilat w/ [facility]; Future; Expected date: 12/22/2022    Depression, unspecified depression type  -     EScitalopram oxalate (LEXAPRO) 20 MG tablet; Take 1 tablet (20 mg total) by mouth once daily.  Dispense: 90 " tablet; Refill: 1    Hypertension, unspecified type  -     lisinopriL 10 MG tablet; Take 1 tablet (10 mg total) by mouth once daily.  Dispense: 90 tablet; Refill: 1    Anxiety    Obesity, unspecified classification, unspecified obesity type, unspecified whether serious comorbidity present    High risk medication use    Other orders  -     rosuvastatin (CRESTOR) 20 MG tablet; Take 1 tablet (20 mg total) by mouth once daily.  Dispense: 90 tablet; Refill: 3      Follow up in about 6 months (around 6/22/2023), or if symptoms worsen or fail to improve, for medication management.        12/22/2022 Renetta Bunch

## 2023-01-17 ENCOUNTER — HOSPITAL ENCOUNTER (OUTPATIENT)
Dept: RADIOLOGY | Facility: HOSPITAL | Age: 48
Discharge: HOME | End: 2023-01-17
Attending: NURSE PRACTITIONER
Payer: COMMERCIAL

## 2023-01-17 VITALS — HEIGHT: 60 IN | WEIGHT: 210.13 LBS | BODY MASS INDEX: 41.25 KG/M2

## 2023-01-17 DIAGNOSIS — Z12.31 ENCOUNTER FOR SCREENING MAMMOGRAM FOR MALIGNANT NEOPLASM OF BREAST: ICD-10-CM

## 2023-01-17 PROCEDURE — 77067 SCR MAMMO BI INCL CAD: CPT | Mod: TC,PO

## 2023-01-18 ENCOUNTER — TELEPHONE (OUTPATIENT)
Dept: FAMILY MEDICINE | Facility: CLINIC | Age: 48
End: 2023-01-18

## 2023-06-15 ENCOUNTER — TELEPHONE (OUTPATIENT)
Dept: FAMILY MEDICINE | Facility: CLINIC | Age: 48
End: 2023-06-15

## 2023-06-15 DIAGNOSIS — I10 HYPERTENSION, UNSPECIFIED TYPE: ICD-10-CM

## 2023-06-15 DIAGNOSIS — Z00.00 PHYSICAL EXAM: Primary | ICD-10-CM

## 2023-06-15 DIAGNOSIS — Z79.899 HIGH RISK MEDICATION USE: ICD-10-CM

## 2023-06-15 DIAGNOSIS — Z79.899 ENCOUNTER FOR LONG-TERM (CURRENT) USE OF OTHER MEDICATIONS: ICD-10-CM

## 2023-06-20 LAB
ALBUMIN SERPL-MCNC: 4.4 G/DL (ref 3.6–5.1)
ALBUMIN/CREAT UR: 4 MCG/MG CREAT
ALBUMIN/GLOB SERPL: 1.6 (CALC) (ref 1–2.5)
ALP SERPL-CCNC: 56 U/L (ref 31–125)
ALT SERPL-CCNC: 56 U/L (ref 6–29)
APPEARANCE UR: CLEAR
AST SERPL-CCNC: 39 U/L (ref 10–35)
BACTERIA #/AREA URNS HPF: ABNORMAL /HPF
BACTERIA UR CULT: ABNORMAL
BASOPHILS # BLD AUTO: 77 CELLS/UL (ref 0–200)
BASOPHILS NFR BLD AUTO: 1.2 %
BILIRUB SERPL-MCNC: 0.5 MG/DL (ref 0.2–1.2)
BILIRUB UR QL STRIP: NEGATIVE
BUN SERPL-MCNC: 17 MG/DL (ref 7–25)
BUN/CREAT SERPL: ABNORMAL (CALC) (ref 6–22)
CALCIUM SERPL-MCNC: 9.3 MG/DL (ref 8.6–10.2)
CHLORIDE SERPL-SCNC: 105 MMOL/L (ref 98–110)
CHOLEST SERPL-MCNC: 167 MG/DL
CHOLEST/HDLC SERPL: 3.6 (CALC)
CO2 SERPL-SCNC: 26 MMOL/L (ref 20–32)
COLOR UR: YELLOW
CREAT SERPL-MCNC: 0.65 MG/DL (ref 0.5–0.99)
CREAT UR-MCNC: 166 MG/DL (ref 20–275)
EGFR: 110 ML/MIN/1.73M2
EOSINOPHIL # BLD AUTO: 378 CELLS/UL (ref 15–500)
EOSINOPHIL NFR BLD AUTO: 5.9 %
ERYTHROCYTE [DISTWIDTH] IN BLOOD BY AUTOMATED COUNT: 12.4 % (ref 11–15)
GLOBULIN SER CALC-MCNC: 2.7 G/DL (CALC) (ref 1.9–3.7)
GLUCOSE SERPL-MCNC: 93 MG/DL (ref 65–99)
GLUCOSE UR QL STRIP: NEGATIVE
HCT VFR BLD AUTO: 42.3 % (ref 35–45)
HDLC SERPL-MCNC: 47 MG/DL
HGB BLD-MCNC: 13.2 G/DL (ref 11.7–15.5)
HGB UR QL STRIP: NEGATIVE
HYALINE CASTS #/AREA URNS LPF: ABNORMAL /LPF
KETONES UR QL STRIP: NEGATIVE
LDLC SERPL CALC-MCNC: 98 MG/DL (CALC)
LEUKOCYTE ESTERASE UR QL STRIP: NEGATIVE
LYMPHOCYTES # BLD AUTO: 2214 CELLS/UL (ref 850–3900)
LYMPHOCYTES NFR BLD AUTO: 34.6 %
MCH RBC QN AUTO: 27 PG (ref 27–33)
MCHC RBC AUTO-ENTMCNC: 31.2 G/DL (ref 32–36)
MCV RBC AUTO: 86.7 FL (ref 80–100)
MICROALBUMIN UR-MCNC: 0.7 MG/DL
MONOCYTES # BLD AUTO: 448 CELLS/UL (ref 200–950)
MONOCYTES NFR BLD AUTO: 7 %
NEUTROPHILS # BLD AUTO: 3283 CELLS/UL (ref 1500–7800)
NEUTROPHILS NFR BLD AUTO: 51.3 %
NITRITE UR QL STRIP: NEGATIVE
NONHDLC SERPL-MCNC: 120 MG/DL (CALC)
PH UR STRIP: 5.5 [PH] (ref 5–8)
PLATELET # BLD AUTO: 280 THOUSAND/UL (ref 140–400)
PMV BLD REES-ECKER: 9 FL (ref 7.5–12.5)
POTASSIUM SERPL-SCNC: 4.4 MMOL/L (ref 3.5–5.3)
PROT SERPL-MCNC: 7.1 G/DL (ref 6.1–8.1)
PROT UR QL STRIP: NEGATIVE
RBC # BLD AUTO: 4.88 MILLION/UL (ref 3.8–5.1)
RBC #/AREA URNS HPF: ABNORMAL /HPF
SERVICE CMNT-IMP: ABNORMAL
SODIUM SERPL-SCNC: 139 MMOL/L (ref 135–146)
SP GR UR STRIP: 1.02 (ref 1–1.03)
SQUAMOUS #/AREA URNS HPF: ABNORMAL /HPF
TRIGL SERPL-MCNC: 128 MG/DL
TSH SERPL-ACNC: 0.79 MIU/L
WBC # BLD AUTO: 6.4 THOUSAND/UL (ref 3.8–10.8)
WBC #/AREA URNS HPF: ABNORMAL /HPF

## 2023-06-22 ENCOUNTER — OFFICE VISIT (OUTPATIENT)
Dept: FAMILY MEDICINE | Facility: CLINIC | Age: 48
End: 2023-06-22
Payer: COMMERCIAL

## 2023-06-22 VITALS
HEART RATE: 83 BPM | OXYGEN SATURATION: 98 % | WEIGHT: 207.63 LBS | DIASTOLIC BLOOD PRESSURE: 88 MMHG | HEIGHT: 60 IN | BODY MASS INDEX: 40.76 KG/M2 | SYSTOLIC BLOOD PRESSURE: 120 MMHG

## 2023-06-22 DIAGNOSIS — F32.A DEPRESSION, UNSPECIFIED DEPRESSION TYPE: ICD-10-CM

## 2023-06-22 DIAGNOSIS — E78.5 HYPERLIPIDEMIA, UNSPECIFIED HYPERLIPIDEMIA TYPE: ICD-10-CM

## 2023-06-22 DIAGNOSIS — Z79.899 HIGH RISK MEDICATION USE: ICD-10-CM

## 2023-06-22 DIAGNOSIS — R74.8 ELEVATED LIVER ENZYMES: Primary | ICD-10-CM

## 2023-06-22 DIAGNOSIS — E66.01 MORBID OBESITY: ICD-10-CM

## 2023-06-22 DIAGNOSIS — I10 HYPERTENSION, UNSPECIFIED TYPE: ICD-10-CM

## 2023-06-22 PROCEDURE — 3079F PR MOST RECENT DIASTOLIC BLOOD PRESSURE 80-89 MM HG: ICD-10-PCS | Mod: CPTII,S$GLB,, | Performed by: NURSE PRACTITIONER

## 2023-06-22 PROCEDURE — 1159F PR MEDICATION LIST DOCUMENTED IN MEDICAL RECORD: ICD-10-PCS | Mod: CPTII,S$GLB,, | Performed by: NURSE PRACTITIONER

## 2023-06-22 PROCEDURE — 4010F PR ACE/ARB THEARPY RXD/TAKEN: ICD-10-PCS | Mod: CPTII,S$GLB,, | Performed by: NURSE PRACTITIONER

## 2023-06-22 PROCEDURE — 4010F ACE/ARB THERAPY RXD/TAKEN: CPT | Mod: CPTII,S$GLB,, | Performed by: NURSE PRACTITIONER

## 2023-06-22 PROCEDURE — 1160F RVW MEDS BY RX/DR IN RCRD: CPT | Mod: CPTII,S$GLB,, | Performed by: NURSE PRACTITIONER

## 2023-06-22 PROCEDURE — 99214 OFFICE O/P EST MOD 30 MIN: CPT | Mod: S$GLB,,, | Performed by: NURSE PRACTITIONER

## 2023-06-22 PROCEDURE — 3066F PR DOCUMENTATION OF TREATMENT FOR NEPHROPATHY: ICD-10-PCS | Mod: CPTII,S$GLB,, | Performed by: NURSE PRACTITIONER

## 2023-06-22 PROCEDURE — 3061F PR NEG MICROALBUMINURIA RESULT DOCUMENTED/REVIEW: ICD-10-PCS | Mod: CPTII,S$GLB,, | Performed by: NURSE PRACTITIONER

## 2023-06-22 PROCEDURE — 99214 PR OFFICE/OUTPT VISIT, EST, LEVL IV, 30-39 MIN: ICD-10-PCS | Mod: S$GLB,,, | Performed by: NURSE PRACTITIONER

## 2023-06-22 PROCEDURE — 3061F NEG MICROALBUMINURIA REV: CPT | Mod: CPTII,S$GLB,, | Performed by: NURSE PRACTITIONER

## 2023-06-22 PROCEDURE — 3079F DIAST BP 80-89 MM HG: CPT | Mod: CPTII,S$GLB,, | Performed by: NURSE PRACTITIONER

## 2023-06-22 PROCEDURE — 3008F PR BODY MASS INDEX (BMI) DOCUMENTED: ICD-10-PCS | Mod: CPTII,S$GLB,, | Performed by: NURSE PRACTITIONER

## 2023-06-22 PROCEDURE — 1159F MED LIST DOCD IN RCRD: CPT | Mod: CPTII,S$GLB,, | Performed by: NURSE PRACTITIONER

## 2023-06-22 PROCEDURE — 3008F BODY MASS INDEX DOCD: CPT | Mod: CPTII,S$GLB,, | Performed by: NURSE PRACTITIONER

## 2023-06-22 PROCEDURE — 3074F SYST BP LT 130 MM HG: CPT | Mod: CPTII,S$GLB,, | Performed by: NURSE PRACTITIONER

## 2023-06-22 PROCEDURE — 3074F PR MOST RECENT SYSTOLIC BLOOD PRESSURE < 130 MM HG: ICD-10-PCS | Mod: CPTII,S$GLB,, | Performed by: NURSE PRACTITIONER

## 2023-06-22 PROCEDURE — 1160F PR REVIEW ALL MEDS BY PRESCRIBER/CLIN PHARMACIST DOCUMENTED: ICD-10-PCS | Mod: CPTII,S$GLB,, | Performed by: NURSE PRACTITIONER

## 2023-06-22 PROCEDURE — 3066F NEPHROPATHY DOC TX: CPT | Mod: CPTII,S$GLB,, | Performed by: NURSE PRACTITIONER

## 2023-06-22 RX ORDER — ROSUVASTATIN CALCIUM 20 MG/1
20 TABLET, COATED ORAL DAILY
Qty: 90 TABLET | Refills: 3 | Status: SHIPPED | OUTPATIENT
Start: 2023-06-22 | End: 2023-12-11 | Stop reason: SDUPTHER

## 2023-06-22 RX ORDER — ESCITALOPRAM OXALATE 20 MG/1
20 TABLET ORAL DAILY
Qty: 90 TABLET | Refills: 1 | Status: SHIPPED | OUTPATIENT
Start: 2023-06-22 | End: 2023-12-11 | Stop reason: SDUPTHER

## 2023-06-22 RX ORDER — LISINOPRIL 10 MG/1
10 TABLET ORAL DAILY
Qty: 90 TABLET | Refills: 3 | Status: SHIPPED | OUTPATIENT
Start: 2023-06-22 | End: 2023-12-11 | Stop reason: SDUPTHER

## 2023-06-22 NOTE — PROGRESS NOTES
SUBJECTIVE:    Patient ID: Zelda Acevedo is a 47 y.o. female.    Chief Complaint: Follow-up (Bottles brought/ General check up/ Eye exam done at [facility] In [facility] [place])    47 year oldpresents for check up. Overall doing well. Treated for hypertension depression, and hyperlipidemia. Taking meds as prescribed.not watching diet as much. Blood pressure in office is well controlled. Does not check at home. Stress is manageable. Recently had labs. Would like to discuss. Liver enzymes were slightly elevated. Does not drink. Takes tylenol occasionally.       Telephone on 06/15/2023   Component Date Value Ref Range Status    WBC 06/19/2023 6.4  3.8 - 10.8 Thousand/uL Final    RBC 06/19/2023 4.88  3.80 - 5.10 Million/uL Final    Hemoglobin 06/19/2023 13.2  11.7 - 15.5 g/dL Final    Hematocrit 06/19/2023 42.3  35.0 - 45.0 % Final    MCV 06/19/2023 86.7  80.0 - 100.0 fL Final    MCH 06/19/2023 27.0  27.0 - 33.0 pg Final    MCHC 06/19/2023 31.2 (L)  32.0 - 36.0 g/dL Final    RDW 06/19/2023 12.4  11.0 - 15.0 % Final    Platelets 06/19/2023 280  140 - 400 Thousand/uL Final    MPV 06/19/2023 9.0  7.5 - 12.5 fL Final    Neutrophils, Abs 06/19/2023 3,283  1,500 - 7,800 cells/uL Final    Lymph # 06/19/2023 2,214  850 - 3,900 cells/uL Final    Mono # 06/19/2023 448  200 - 950 cells/uL Final    Eos # 06/19/2023 378  15 - 500 cells/uL Final    Baso # 06/19/2023 77  0 - 200 cells/uL Final    Neutrophils Relative 06/19/2023 51.3  % Final    Lymph % 06/19/2023 34.6  % Final    Mono % 06/19/2023 7.0  % Final    Eosinophil % 06/19/2023 5.9  % Final    Basophil % 06/19/2023 1.2  % Final    Glucose 06/19/2023 93  65 - 99 mg/dL Final    BUN 06/19/2023 17  7 - 25 mg/dL Final    Creatinine 06/19/2023 0.65  0.50 - 0.99 mg/dL Final    eGFR 06/19/2023 110  > OR = 60 mL/min/1.73m2 Final    BUN/Creatinine Ratio 06/19/2023 NOT APPLICABLE  6 - 22 (calc) Final    Sodium 06/19/2023 139  135 - 146 mmol/L Final    Potassium 06/19/2023 4.4  3.5 - 5.3  mmol/L Final    Chloride 06/19/2023 105  98 - 110 mmol/L Final    CO2 06/19/2023 26  20 - 32 mmol/L Final    Calcium 06/19/2023 9.3  8.6 - 10.2 mg/dL Final    Total Protein 06/19/2023 7.1  6.1 - 8.1 g/dL Final    Albumin 06/19/2023 4.4  3.6 - 5.1 g/dL Final    Globulin, Total 06/19/2023 2.7  1.9 - 3.7 g/dL (calc) Final    Albumin/Globulin Ratio 06/19/2023 1.6  1.0 - 2.5 (calc) Final    Total Bilirubin 06/19/2023 0.5  0.2 - 1.2 mg/dL Final    Alkaline Phosphatase 06/19/2023 56  31 - 125 U/L Final    AST 06/19/2023 39 (H)  10 - 35 U/L Final    ALT 06/19/2023 56 (H)  6 - 29 U/L Final    Cholesterol 06/19/2023 167  <200 mg/dL Final    HDL 06/19/2023 47 (L)  > OR = 50 mg/dL Final    Triglycerides 06/19/2023 128  <150 mg/dL Final    LDL Cholesterol 06/19/2023 98  mg/dL (calc) Final    HDL/Cholesterol Ratio 06/19/2023 3.6  <5.0 (calc) Final    Non HDL Chol. (LDL+VLDL) 06/19/2023 120  <130 mg/dL (calc) Final    TSH w/reflex to FT4 06/19/2023 0.79  mIU/L Final    Color, UA 06/19/2023 YELLOW  YELLOW Final    Appearance, UA 06/19/2023 CLEAR  CLEAR Final    Specific Gravity, UA 06/19/2023 1.024  1.001 - 1.035 Final    pH, UA 06/19/2023 5.5  5.0 - 8.0 Final    Glucose, UA 06/19/2023 NEGATIVE  NEGATIVE Final    Bilirubin, UA 06/19/2023 NEGATIVE  NEGATIVE Final    Ketones, UA 06/19/2023 NEGATIVE  NEGATIVE Final    Occult Blood UA 06/19/2023 NEGATIVE  NEGATIVE Final    Protein, UA 06/19/2023 NEGATIVE  NEGATIVE Final    Nitrite, UA 06/19/2023 NEGATIVE  NEGATIVE Final    Leukocytes, UA 06/19/2023 NEGATIVE  NEGATIVE Final    WBC Casts, UA 06/19/2023 0-5  < OR = 5 /HPF Final    RBC Casts, UA 06/19/2023 NONE SEEN  < OR = 2 /HPF Final    Squam Epithel, UA 06/19/2023 6-10 (A)  < OR = 5 /HPF Final    Bacteria, UA 06/19/2023 NONE SEEN  NONE SEEN /HPF Final    Hyaline Casts, UA 06/19/2023 NONE SEEN  NONE SEEN /LPF Final    Service Cmt: 06/19/2023    Final    Reflexive Urine Culture 06/19/2023    Final    Creatinine, Urine 06/19/2023 166  20  - 275 mg/dL Final    Microalb, Ur 2023 0.7  See Note: mg/dL Final    Microalb/Creat Ratio 2023 4  <30 mcg/mg creat Final       History reviewed. No pertinent past medical history.  Social History     Socioeconomic History    Marital status:    Tobacco Use    Smoking status: Never    Smokeless tobacco: Never   Substance and Sexual Activity    Alcohol use: Yes     Alcohol/week: 1.0 standard drink     Types: 1 Standard drinks or equivalent per week     Comment: This may only be one to two times a month    Drug use: Never    Sexual activity: Not Currently     Partners: Male     Birth control/protection: Abstinence     Social Determinants of Health     Financial Resource Strain: Low Risk     Difficulty of Paying Living Expenses: Not hard at all   Food Insecurity: No Food Insecurity    Worried About Running Out of Food in the Last Year: Never true    Ran Out of Food in the Last Year: Never true   Transportation Needs: No Transportation Needs    Lack of Transportation (Medical): No    Lack of Transportation (Non-Medical): No   Physical Activity: Inactive    Days of Exercise per Week: 0 days    Minutes of Exercise per Session: 0 min   Stress: No Stress Concern Present    Feeling of Stress : Only a little   Social Connections: Unknown    Frequency of Communication with Friends and Family: Twice a week    Frequency of Social Gatherings with Friends and Family: Once a week    Active Member of Clubs or Organizations: No    Attends Club or Organization Meetings: Never    Marital Status:    Housing Stability: Low Risk     Unable to Pay for Housing in the Last Year: No    Number of Places Lived in the Last Year: 1    Unstable Housing in the Last Year: No     Past Surgical History:   Procedure Laterality Date     SECTION       SECTION      TONSILLECTOMY       Family History   Problem Relation Age of Onset    Arthritis Mother     Arthritis Father     Cancer Father         Melnoma cancer of  "the eye    Hearing loss Father     Cancer Sister         Skin cancer       Review of patient's allergies indicates:  No Known Allergies    Current Outpatient Medications:     cetirizine (ZYRTEC) 10 MG tablet, Take 10 mg by mouth once daily., Disp: , Rfl:     soy isofla/blk cohosh/mag bark (ESTROVEN ORAL), Take by mouth once daily., Disp: , Rfl:     UNABLE TO FIND, medication name: Danilo Women's Multi Gummy, Disp: , Rfl:     EScitalopram oxalate (LEXAPRO) 20 MG tablet, Take 1 tablet (20 mg total) by mouth once daily., Disp: 90 tablet, Rfl: 1    lisinopriL 10 MG tablet, Take 1 tablet (10 mg total) by mouth once daily., Disp: 90 tablet, Rfl: 3    multivitamin/iron/folic acid (CENTRUM WOMEN ORAL), Take by mouth., Disp: , Rfl:     rosuvastatin (CRESTOR) 20 MG tablet, Take 1 tablet (20 mg total) by mouth once daily., Disp: 90 tablet, Rfl: 3    Review of Systems   Constitutional:  Negative for chills, fever and unexpected weight change.   HENT:  Negative for ear pain, rhinorrhea and sore throat.    Eyes:  Negative for pain and visual disturbance.   Respiratory:  Negative for cough and shortness of breath.    Cardiovascular:  Negative for chest pain, palpitations and leg swelling.   Gastrointestinal:  Negative for abdominal pain, diarrhea, nausea and vomiting.   Genitourinary:  Negative for difficulty urinating, hematuria and vaginal bleeding.   Musculoskeletal:  Negative for arthralgias.   Skin:  Negative for rash.   Neurological:  Negative for dizziness, weakness and headaches.   Psychiatric/Behavioral:  Negative for agitation and sleep disturbance. The patient is not nervous/anxious.         Objective:      Vitals:    06/22/23 0824   BP: 120/88   Pulse: 83   SpO2: 98%   Weight: 94.2 kg (207 lb 9.6 oz)   Height: 5' 0.25" (1.53 m)     Physical Exam  Vitals and nursing note reviewed.   Constitutional:       General: She is not in acute distress.     Appearance: Normal appearance. She is well-developed. She is obese.   HENT: "      Head: Normocephalic.      Right Ear: External ear normal.      Left Ear: External ear normal.   Eyes:      Conjunctiva/sclera: Conjunctivae normal.   Neck:      Vascular: No JVD.   Cardiovascular:      Rate and Rhythm: Normal rate and regular rhythm.      Heart sounds: No murmur heard.  Pulmonary:      Effort: Pulmonary effort is normal.      Breath sounds: Normal breath sounds.   Abdominal:      General: Bowel sounds are normal.      Palpations: Abdomen is soft.   Musculoskeletal:         General: No deformity. Normal range of motion.      Cervical back: Normal range of motion and neck supple.   Lymphadenopathy:      Cervical: No cervical adenopathy.   Skin:     General: Skin is warm and dry.      Findings: No rash.   Neurological:      Mental Status: She is alert and oriented to person, place, and time.      Gait: Gait normal.   Psychiatric:         Speech: Speech normal.         Behavior: Behavior normal.         Assessment:       1. Elevated liver enzymes    2. Hypertension, unspecified type    3. Depression, unspecified depression type    4. High risk medication use    5. Hyperlipidemia, unspecified hyperlipidemia type    6. Morbid obesity         Plan:       Elevated liver enzymes  Comments:  repeat in 2 weeks.   Orders:  -     Comprehensive Metabolic Panel; Future; Expected date: 06/22/2023  -     Hepatitis Panel, Acute; Future; Expected date: 06/22/2023    Hypertension, unspecified type  Comments:  continue lisinopril  Orders:  -     lisinopriL 10 MG tablet; Take 1 tablet (10 mg total) by mouth once daily.  Dispense: 90 tablet; Refill: 3    Depression, unspecified depression type  Comments:  continue lexapro. depression is controlled.   Orders:  -     EScitalopram oxalate (LEXAPRO) 20 MG tablet; Take 1 tablet (20 mg total) by mouth once daily.  Dispense: 90 tablet; Refill: 1    High risk medication use  -     Comprehensive Metabolic Panel; Future; Expected date: 06/22/2023  -     Hepatitis Panel, Acute;  Future; Expected date: 06/22/2023    Hyperlipidemia, unspecified hyperlipidemia type  Comments:  continue crestor. low cholesterol diet  Orders:  -     Comprehensive Metabolic Panel; Future; Expected date: 06/22/2023  -     Hepatitis Panel, Acute; Future; Expected date: 06/22/2023    Morbid obesity  Comments:  diet and exercise    Other orders  -     rosuvastatin (CRESTOR) 20 MG tablet; Take 1 tablet (20 mg total) by mouth once daily.  Dispense: 90 tablet; Refill: 3      Follow up in about 6 months (around 12/22/2023), or if symptoms worsen or fail to improve, for medication management.        6/22/2023 Renetta Bunch

## 2023-07-07 LAB
ALBUMIN SERPL-MCNC: 4.3 G/DL (ref 3.6–5.1)
ALBUMIN/GLOB SERPL: 1.5 (CALC) (ref 1–2.5)
ALP SERPL-CCNC: 62 U/L (ref 31–125)
ALT SERPL-CCNC: 35 U/L (ref 6–29)
AST SERPL-CCNC: 22 U/L (ref 10–35)
BILIRUB SERPL-MCNC: 0.5 MG/DL (ref 0.2–1.2)
BUN SERPL-MCNC: 16 MG/DL (ref 7–25)
BUN/CREAT SERPL: ABNORMAL (CALC) (ref 6–22)
CALCIUM SERPL-MCNC: 9.5 MG/DL (ref 8.6–10.2)
CHLORIDE SERPL-SCNC: 103 MMOL/L (ref 98–110)
CO2 SERPL-SCNC: 29 MMOL/L (ref 20–32)
COMMENT: NORMAL
CREAT SERPL-MCNC: 0.69 MG/DL (ref 0.5–0.99)
EGFR: 108 ML/MIN/1.73M2
GLOBULIN SER CALC-MCNC: 2.8 G/DL (CALC) (ref 1.9–3.7)
GLUCOSE SERPL-MCNC: 91 MG/DL (ref 65–99)
HAV IGM SERPL QL IA: NORMAL
HBV CORE IGM SERPL QL IA: NORMAL
HBV SURFACE AG SERPL QL IA: NORMAL
HCV AB SERPL QL IA: NORMAL
POTASSIUM SERPL-SCNC: 4.5 MMOL/L (ref 3.5–5.3)
PROT SERPL-MCNC: 7.1 G/DL (ref 6.1–8.1)
SODIUM SERPL-SCNC: 141 MMOL/L (ref 135–146)

## 2023-07-08 ENCOUNTER — PATIENT MESSAGE (OUTPATIENT)
Dept: FAMILY MEDICINE | Facility: CLINIC | Age: 48
End: 2023-07-08

## 2023-07-10 RX ORDER — VALACYCLOVIR HYDROCHLORIDE 1 G/1
2000 TABLET, FILM COATED ORAL EVERY 12 HOURS
Qty: 20 TABLET | Refills: 0 | Status: SHIPPED | OUTPATIENT
Start: 2023-07-10 | End: 2024-07-09

## 2023-11-14 ENCOUNTER — PATIENT MESSAGE (OUTPATIENT)
Dept: ADMINISTRATIVE | Facility: HOSPITAL | Age: 48
End: 2023-11-14
Payer: COMMERCIAL

## 2023-11-17 ENCOUNTER — PATIENT OUTREACH (OUTPATIENT)
Dept: ADMINISTRATIVE | Facility: HOSPITAL | Age: 48
End: 2023-11-17
Payer: COMMERCIAL

## 2023-11-17 ENCOUNTER — PATIENT MESSAGE (OUTPATIENT)
Dept: ADMINISTRATIVE | Facility: HOSPITAL | Age: 48
End: 2023-11-17
Payer: COMMERCIAL

## 2023-11-17 DIAGNOSIS — Z12.39 ENCOUNTER FOR SCREENING FOR MALIGNANT NEOPLASM OF BREAST, UNSPECIFIED SCREENING MODALITY: Primary | ICD-10-CM

## 2023-11-17 NOTE — PROGRESS NOTES
Population Health Chart Review & Patient Outreach Details    Outreach Performed: YES Telephone Not Successful    Additional Pop Health Notes: called left voice message / portal message sent.           Updates Requested / Reviewed:      Updated Care Coordination Note and Immunizations Reconciliation Completed or Queried: [place]         Health Maintenance Topics Overdue:    Health Maintenance Due   Topic Date Due    HIV Screening  Never done    TETANUS VACCINE  Never done    Hemoglobin A1c (Diabetic Prevention Screening)  Never done    Influenza Vaccine (1) 09/01/2023    COVID-19 Vaccine (4 - 2023-24 season) 09/01/2023    Mammogram  01/17/2024         Health Maintenance Topic(s) Outreach Outcomes & Actions Taken:    Breast Cancer Screening - Outreach Outcomes & Actions Taken  : Mammogram Order Placed and Mammogram Screening Scheduled

## 2023-11-29 ENCOUNTER — TELEPHONE (OUTPATIENT)
Dept: FAMILY MEDICINE | Facility: CLINIC | Age: 48
End: 2023-11-29

## 2023-11-29 DIAGNOSIS — Z79.899 ENCOUNTER FOR LONG-TERM (CURRENT) USE OF OTHER MEDICATIONS: Primary | ICD-10-CM

## 2023-11-29 DIAGNOSIS — F32.A DEPRESSION, UNSPECIFIED DEPRESSION TYPE: ICD-10-CM

## 2023-11-29 DIAGNOSIS — I10 HYPERTENSION, UNSPECIFIED TYPE: ICD-10-CM

## 2023-11-29 DIAGNOSIS — E78.5 HYPERLIPIDEMIA, UNSPECIFIED HYPERLIPIDEMIA TYPE: ICD-10-CM

## 2023-11-29 DIAGNOSIS — R74.8 ELEVATED LIVER ENZYMES: ICD-10-CM

## 2023-12-11 ENCOUNTER — OFFICE VISIT (OUTPATIENT)
Dept: FAMILY MEDICINE | Facility: CLINIC | Age: 48
End: 2023-12-11
Payer: COMMERCIAL

## 2023-12-11 VITALS
BODY MASS INDEX: 40.44 KG/M2 | WEIGHT: 206 LBS | HEIGHT: 60 IN | SYSTOLIC BLOOD PRESSURE: 126 MMHG | DIASTOLIC BLOOD PRESSURE: 84 MMHG | HEART RATE: 72 BPM

## 2023-12-11 DIAGNOSIS — L30.9 DERMATITIS: Primary | ICD-10-CM

## 2023-12-11 DIAGNOSIS — E66.9 OBESITY, UNSPECIFIED CLASSIFICATION, UNSPECIFIED OBESITY TYPE, UNSPECIFIED WHETHER SERIOUS COMORBIDITY PRESENT: ICD-10-CM

## 2023-12-11 DIAGNOSIS — E78.5 HYPERLIPIDEMIA, UNSPECIFIED HYPERLIPIDEMIA TYPE: ICD-10-CM

## 2023-12-11 DIAGNOSIS — I10 HYPERTENSION, UNSPECIFIED TYPE: ICD-10-CM

## 2023-12-11 DIAGNOSIS — F32.A DEPRESSION, UNSPECIFIED DEPRESSION TYPE: ICD-10-CM

## 2023-12-11 PROCEDURE — 3074F SYST BP LT 130 MM HG: CPT | Mod: CPTII,S$GLB,, | Performed by: NURSE PRACTITIONER

## 2023-12-11 PROCEDURE — 1160F RVW MEDS BY RX/DR IN RCRD: CPT | Mod: CPTII,S$GLB,, | Performed by: NURSE PRACTITIONER

## 2023-12-11 PROCEDURE — 3061F NEG MICROALBUMINURIA REV: CPT | Mod: CPTII,S$GLB,, | Performed by: NURSE PRACTITIONER

## 2023-12-11 PROCEDURE — 99214 OFFICE O/P EST MOD 30 MIN: CPT | Mod: S$GLB,,, | Performed by: NURSE PRACTITIONER

## 2023-12-11 PROCEDURE — 3079F DIAST BP 80-89 MM HG: CPT | Mod: CPTII,S$GLB,, | Performed by: NURSE PRACTITIONER

## 2023-12-11 PROCEDURE — 3079F PR MOST RECENT DIASTOLIC BLOOD PRESSURE 80-89 MM HG: ICD-10-PCS | Mod: CPTII,S$GLB,, | Performed by: NURSE PRACTITIONER

## 2023-12-11 PROCEDURE — 3066F PR DOCUMENTATION OF TREATMENT FOR NEPHROPATHY: ICD-10-PCS | Mod: CPTII,S$GLB,, | Performed by: NURSE PRACTITIONER

## 2023-12-11 PROCEDURE — 3061F PR NEG MICROALBUMINURIA RESULT DOCUMENTED/REVIEW: ICD-10-PCS | Mod: CPTII,S$GLB,, | Performed by: NURSE PRACTITIONER

## 2023-12-11 PROCEDURE — 1160F PR REVIEW ALL MEDS BY PRESCRIBER/CLIN PHARMACIST DOCUMENTED: ICD-10-PCS | Mod: CPTII,S$GLB,, | Performed by: NURSE PRACTITIONER

## 2023-12-11 PROCEDURE — 4010F ACE/ARB THERAPY RXD/TAKEN: CPT | Mod: CPTII,S$GLB,, | Performed by: NURSE PRACTITIONER

## 2023-12-11 PROCEDURE — 3008F PR BODY MASS INDEX (BMI) DOCUMENTED: ICD-10-PCS | Mod: CPTII,S$GLB,, | Performed by: NURSE PRACTITIONER

## 2023-12-11 PROCEDURE — 1159F PR MEDICATION LIST DOCUMENTED IN MEDICAL RECORD: ICD-10-PCS | Mod: CPTII,S$GLB,, | Performed by: NURSE PRACTITIONER

## 2023-12-11 PROCEDURE — 4010F PR ACE/ARB THEARPY RXD/TAKEN: ICD-10-PCS | Mod: CPTII,S$GLB,, | Performed by: NURSE PRACTITIONER

## 2023-12-11 PROCEDURE — 3066F NEPHROPATHY DOC TX: CPT | Mod: CPTII,S$GLB,, | Performed by: NURSE PRACTITIONER

## 2023-12-11 PROCEDURE — 1159F MED LIST DOCD IN RCRD: CPT | Mod: CPTII,S$GLB,, | Performed by: NURSE PRACTITIONER

## 2023-12-11 PROCEDURE — 3074F PR MOST RECENT SYSTOLIC BLOOD PRESSURE < 130 MM HG: ICD-10-PCS | Mod: CPTII,S$GLB,, | Performed by: NURSE PRACTITIONER

## 2023-12-11 PROCEDURE — 99214 PR OFFICE/OUTPT VISIT, EST, LEVL IV, 30-39 MIN: ICD-10-PCS | Mod: S$GLB,,, | Performed by: NURSE PRACTITIONER

## 2023-12-11 PROCEDURE — 3008F BODY MASS INDEX DOCD: CPT | Mod: CPTII,S$GLB,, | Performed by: NURSE PRACTITIONER

## 2023-12-11 RX ORDER — ROSUVASTATIN CALCIUM 20 MG/1
20 TABLET, COATED ORAL DAILY
Qty: 90 TABLET | Refills: 3 | Status: SHIPPED | OUTPATIENT
Start: 2023-12-11 | End: 2024-12-10

## 2023-12-11 RX ORDER — TRIAMCINOLONE ACETONIDE 1 MG/G
CREAM TOPICAL 2 TIMES DAILY
Qty: 15 G | Refills: 0 | Status: SHIPPED | OUTPATIENT
Start: 2023-12-11

## 2023-12-11 RX ORDER — LISINOPRIL 10 MG/1
10 TABLET ORAL DAILY
Qty: 90 TABLET | Refills: 3 | Status: SHIPPED | OUTPATIENT
Start: 2023-12-11

## 2023-12-11 RX ORDER — ESCITALOPRAM OXALATE 20 MG/1
20 TABLET ORAL DAILY
Qty: 90 TABLET | Refills: 3 | Status: SHIPPED | OUTPATIENT
Start: 2023-12-11

## 2023-12-11 NOTE — PROGRESS NOTES
SUBJECTIVE:    Patient ID: Zelda Acevedo is a 48 y.o. female.    Chief Complaint: Follow-up (Brought bottles. Ac )    48 year oldpresents for check up. Overall doing well. Treated for hypertension depression, and hyperlipidemia. Taking meds as prescribed.not watching diet as much. Blood pressure in office is well controlled. Stress is manageable. Happy with lexapro.due for 6 month follow up labs.         Office Visit on 06/22/2023   Component Date Value Ref Range Status    Glucose 07/06/2023 91  65 - 99 mg/dL Final    BUN 07/06/2023 16  7 - 25 mg/dL Final    Creatinine 07/06/2023 0.69  0.50 - 0.99 mg/dL Final    eGFR 07/06/2023 108  > OR = 60 mL/min/1.73m2 Final    BUN/Creatinine Ratio 07/06/2023 NOT APPLICABLE  6 - 22 (calc) Final    Sodium 07/06/2023 141  135 - 146 mmol/L Final    Potassium 07/06/2023 4.5  3.5 - 5.3 mmol/L Final    Chloride 07/06/2023 103  98 - 110 mmol/L Final    CO2 07/06/2023 29  20 - 32 mmol/L Final    Calcium 07/06/2023 9.5  8.6 - 10.2 mg/dL Final    Total Protein 07/06/2023 7.1  6.1 - 8.1 g/dL Final    Albumin 07/06/2023 4.3  3.6 - 5.1 g/dL Final    Globulin, Total 07/06/2023 2.8  1.9 - 3.7 g/dL (calc) Final    Albumin/Globulin Ratio 07/06/2023 1.5  1.0 - 2.5 (calc) Final    Total Bilirubin 07/06/2023 0.5  0.2 - 1.2 mg/dL Final    Alkaline Phosphatase 07/06/2023 62  31 - 125 U/L Final    AST 07/06/2023 22  10 - 35 U/L Final    ALT 07/06/2023 35 (H)  6 - 29 U/L Final    Hep A IgM 07/06/2023 NON-REACTIVE  NON-REACTIVE Final    Comment 07/06/2023 For additional information, please refer to http://education.[employer]Adknowledge/faq/QPG672 (This link is being provided for informational/ educational purposes only.)   Final    Hepatitis B Surface Ag 07/06/2023 NON-REACTIVE  NON-REACTIVE Final    Comment 07/06/2023 For additional information, please refer to http://education.[employer].Adknowledge/faq/XCU894 (This link is being provided for informational/ educational purposes only.)   Final    Hep B  C IgM 07/06/2023 NON-REACTIVE  NON-REACTIVE Final    Comment 07/06/2023 For additional information, please refer to http://education.[employer]/faq/CCS495 (This link is being provided for informational/ educational purposes only.)   Final    Hepatitis C Ab 07/06/2023 NON-REACTIVE  NON-REACTIVE Final   Telephone on 06/15/2023   Component Date Value Ref Range Status    WBC 06/19/2023 6.4  3.8 - 10.8 Thousand/uL Final    RBC 06/19/2023 4.88  3.80 - 5.10 Million/uL Final    Hemoglobin 06/19/2023 13.2  11.7 - 15.5 g/dL Final    Hematocrit 06/19/2023 42.3  35.0 - 45.0 % Final    MCV 06/19/2023 86.7  80.0 - 100.0 fL Final    MCH 06/19/2023 27.0  27.0 - 33.0 pg Final    MCHC 06/19/2023 31.2 (L)  32.0 - 36.0 g/dL Final    RDW 06/19/2023 12.4  11.0 - 15.0 % Final    Platelets 06/19/2023 280  140 - 400 Thousand/uL Final    MPV 06/19/2023 9.0  7.5 - 12.5 fL Final    Neutrophils, Abs 06/19/2023 3,283  1,500 - 7,800 cells/uL Final    Lymph # 06/19/2023 2,214  850 - 3,900 cells/uL Final    Mono # 06/19/2023 448  200 - 950 cells/uL Final    Eos # 06/19/2023 378  15 - 500 cells/uL Final    Baso # 06/19/2023 77  0 - 200 cells/uL Final    Neutrophils Relative 06/19/2023 51.3  % Final    Lymph % 06/19/2023 34.6  % Final    Mono % 06/19/2023 7.0  % Final    Eosinophil % 06/19/2023 5.9  % Final    Basophil % 06/19/2023 1.2  % Final    Glucose 06/19/2023 93  65 - 99 mg/dL Final    BUN 06/19/2023 17  7 - 25 mg/dL Final    Creatinine 06/19/2023 0.65  0.50 - 0.99 mg/dL Final    eGFR 06/19/2023 110  > OR = 60 mL/min/1.73m2 Final    BUN/Creatinine Ratio 06/19/2023 NOT APPLICABLE  6 - 22 (calc) Final    Sodium 06/19/2023 139  135 - 146 mmol/L Final    Potassium 06/19/2023 4.4  3.5 - 5.3 mmol/L Final    Chloride 06/19/2023 105  98 - 110 mmol/L Final    CO2 06/19/2023 26  20 - 32 mmol/L Final    Calcium 06/19/2023 9.3  8.6 - 10.2 mg/dL Final    Total Protein 06/19/2023 7.1  6.1 - 8.1 g/dL Final    Albumin 06/19/2023 4.4  3.6 - 5.1 g/dL  Final    Globulin, Total 06/19/2023 2.7  1.9 - 3.7 g/dL (calc) Final    Albumin/Globulin Ratio 06/19/2023 1.6  1.0 - 2.5 (calc) Final    Total Bilirubin 06/19/2023 0.5  0.2 - 1.2 mg/dL Final    Alkaline Phosphatase 06/19/2023 56  31 - 125 U/L Final    AST 06/19/2023 39 (H)  10 - 35 U/L Final    ALT 06/19/2023 56 (H)  6 - 29 U/L Final    Cholesterol 06/19/2023 167  <200 mg/dL Final    HDL 06/19/2023 47 (L)  > OR = 50 mg/dL Final    Triglycerides 06/19/2023 128  <150 mg/dL Final    LDL Cholesterol 06/19/2023 98  mg/dL (calc) Final    HDL/Cholesterol Ratio 06/19/2023 3.6  <5.0 (calc) Final    Non HDL Chol. (LDL+VLDL) 06/19/2023 120  <130 mg/dL (calc) Final    TSH w/reflex to FT4 06/19/2023 0.79  mIU/L Final    Color, UA 06/19/2023 YELLOW  YELLOW Final    Appearance, UA 06/19/2023 CLEAR  CLEAR Final    Specific Gravity, UA 06/19/2023 1.024  1.001 - 1.035 Final    pH, UA 06/19/2023 5.5  5.0 - 8.0 Final    Glucose, UA 06/19/2023 NEGATIVE  NEGATIVE Final    Bilirubin, UA 06/19/2023 NEGATIVE  NEGATIVE Final    Ketones, UA 06/19/2023 NEGATIVE  NEGATIVE Final    Occult Blood UA 06/19/2023 NEGATIVE  NEGATIVE Final    Protein, UA 06/19/2023 NEGATIVE  NEGATIVE Final    Nitrite, UA 06/19/2023 NEGATIVE  NEGATIVE Final    Leukocytes, UA 06/19/2023 NEGATIVE  NEGATIVE Final    WBC Casts, UA 06/19/2023 0-5  < OR = 5 /HPF Final    RBC Casts, UA 06/19/2023 NONE SEEN  < OR = 2 /HPF Final    Squam Epithel, UA 06/19/2023 6-10 (A)  < OR = 5 /HPF Final    Bacteria, UA 06/19/2023 NONE SEEN  NONE SEEN /HPF Final    Hyaline Casts, UA 06/19/2023 NONE SEEN  NONE SEEN /LPF Final    Service Cmt: 06/19/2023    Final    Reflexive Urine Culture 06/19/2023    Final    Creatinine, Urine 06/19/2023 166  20 - 275 mg/dL Final    Microalb, Ur 06/19/2023 0.7  See Note: mg/dL Final    Microalb/Creat Ratio 06/19/2023 4  <30 mcg/mg creat Final       History reviewed. No pertinent past medical history.  Social History     Socioeconomic History    Marital status:     Tobacco Use    Smoking status: Never    Smokeless tobacco: Never   Substance and Sexual Activity    Alcohol use: Yes     Alcohol/week: 1.0 standard drink of alcohol     Types: 1 Standard drinks or equivalent per week     Comment: This may only be one to two times a month    Drug use: Never    Sexual activity: Not Currently     Partners: Male     Birth control/protection: Abstinence     Social Determinants of Health     Financial Resource Strain: Low Risk  (12/22/2022)    Overall Financial Resource Strain (CARDIA)     Difficulty of Paying Living Expenses: Not hard at all   Food Insecurity: No Food Insecurity (12/22/2022)    Hunger Vital Sign     Worried About Running Out of Food in the Last Year: Never true     Ran Out of Food in the Last Year: Never true   Transportation Needs: No Transportation Needs (12/22/2022)    PRAPARE - Transportation     Lack of Transportation (Medical): No     Lack of Transportation (Non-Medical): No   Physical Activity: Unknown (12/22/2022)    Exercise Vital Sign     Days of Exercise per Week: 0 days   Recent Concern: Physical Activity - Inactive (10/25/2022)    Exercise Vital Sign     Days of Exercise per Week: 0 days     Minutes of Exercise per Session: 0 min   Stress: No Stress Concern Present (12/22/2022)    [employer] [employer] - Occupational Stress Questionnaire     Feeling of Stress : Only a little   Recent Concern: Stress - Stress Concern Present (10/25/2022)    [employer] [place] [employer] - Occupational Stress Questionnaire     Feeling of Stress : To some extent   Social Connections: Unknown (12/22/2022)    Social Connection and Isolation Panel [NHANES]     Frequency of Communication with Friends and Family: Twice a week     Frequency of Social Gatherings with Friends and Family: Once a week     Active Member of Clubs or Organizations: No     Attends Club or Organization Meetings: Never     Marital Status:    Housing Stability: Low  Risk  (2022)    Housing Stability Vital Sign     Unable to Pay for Housing in the Last Year: No     Number of Places Lived in the Last Year: 1     Unstable Housing in the Last Year: No     Past Surgical History:   Procedure Laterality Date     SECTION       SECTION      TONSILLECTOMY       Family History   Problem Relation Age of Onset    Arthritis Mother     Arthritis Father     Cancer Father         Melnoma cancer of the eye    Hearing loss Father     Cancer Sister         Skin cancer       All of your core healthy metrics are met.      Review of patient's allergies indicates:  No Known Allergies    Current Outpatient Medications:     cetirizine (ZYRTEC) 10 MG tablet, Take 10 mg by mouth once daily., Disp: , Rfl:     multivitamin/iron/folic acid (CENTRUM WOMEN ORAL), Take by mouth., Disp: , Rfl:     UNABLE TO FIND, medication name: Danilo Women's Multi Gummy, Disp: , Rfl:     EScitalopram oxalate (LEXAPRO) 20 MG tablet, Take 1 tablet (20 mg total) by mouth once daily., Disp: 90 tablet, Rfl: 3    lisinopriL 10 MG tablet, Take 1 tablet (10 mg total) by mouth once daily., Disp: 90 tablet, Rfl: 3    rosuvastatin (CRESTOR) 20 MG tablet, Take 1 tablet (20 mg total) by mouth once daily., Disp: 90 tablet, Rfl: 3    soy isofla/blk cohosh/mag bark (ESTROVEN ORAL), Take by mouth once daily., Disp: , Rfl:     triamcinolone acetonide 0.1% (KENALOG) 0.1 % cream, Apply topically 2 (two) times daily., Disp: 15 g, Rfl: 0    valACYclovir (VALTREX) 1000 MG tablet, Take 2 tablets (2,000 mg total) by mouth every 12 (twelve) hours., Disp: 20 tablet, Rfl: 0    Review of Systems   Constitutional:  Negative for chills, fever and unexpected weight change.   HENT:  Negative for ear pain, rhinorrhea and sore throat.    Eyes:  Negative for pain and visual disturbance.   Respiratory:  Negative for cough and shortness of breath.    Cardiovascular:  Negative for chest pain, palpitations and leg swelling.   Gastrointestinal:   "Negative for abdominal pain, diarrhea, nausea and vomiting.   Genitourinary:  Negative for difficulty urinating, hematuria and vaginal bleeding.   Musculoskeletal:  Negative for arthralgias.   Skin:  Negative for rash.   Neurological:  Negative for dizziness, weakness and headaches.   Psychiatric/Behavioral:  Negative for agitation and sleep disturbance. The patient is not nervous/anxious.           Objective:      Vitals:    12/11/23 0924   BP: 126/84   Pulse: 72   Weight: 93.4 kg (206 lb)   Height: 5' 0.25" (1.53 m)     Physical Exam  Vitals and nursing note reviewed.   Constitutional:       Appearance: Normal appearance. She is well-developed.   HENT:      Head: Normocephalic.      Right Ear: External ear normal.      Left Ear: External ear normal.   Eyes:      Conjunctiva/sclera: Conjunctivae normal.      Pupils: Pupils are equal, round, and reactive to light.   Neck:      Vascular: No JVD.   Cardiovascular:      Rate and Rhythm: Normal rate and regular rhythm.      Heart sounds: No murmur heard.  Pulmonary:      Effort: Pulmonary effort is normal.      Breath sounds: Normal breath sounds.   Abdominal:      General: Bowel sounds are normal.      Palpations: Abdomen is soft.   Musculoskeletal:         General: No deformity. Normal range of motion.      Cervical back: Normal range of motion and neck supple.   Lymphadenopathy:      Cervical: No cervical adenopathy.   Skin:     General: Skin is warm and dry.      Findings: No rash.   Neurological:      Mental Status: She is alert and oriented to person, place, and time.      Gait: Gait normal.   Psychiatric:         Mood and Affect: Mood and affect normal.         Speech: Speech normal.         Behavior: Behavior normal.           Assessment:       1. Dermatitis    2. Depression, unspecified depression type    3. Hypertension, unspecified type    4. Obesity, unspecified classification, unspecified obesity type, unspecified whether serious comorbidity present    5. " Hyperlipidemia, unspecified hyperlipidemia type         Plan:       Dermatitis  -     triamcinolone acetonide 0.1% (KENALOG) 0.1 % cream; Apply topically 2 (two) times daily.  Dispense: 15 g; Refill: 0    Depression, unspecified depression type  Comments:  continue lexapro. depression is controlled.   Orders:  -     EScitalopram oxalate (LEXAPRO) 20 MG tablet; Take 1 tablet (20 mg total) by mouth once daily.  Dispense: 90 tablet; Refill: 3    Hypertension, unspecified type  Comments:  continue lisinopril  Orders:  -     lisinopriL 10 MG tablet; Take 1 tablet (10 mg total) by mouth once daily.  Dispense: 90 tablet; Refill: 3    Obesity, unspecified classification, unspecified obesity type, unspecified whether serious comorbidity present  Comments:  diet and exercise    Hyperlipidemia, unspecified hyperlipidemia type  Comments:  crestor    Other orders  -     rosuvastatin (CRESTOR) 20 MG tablet; Take 1 tablet (20 mg total) by mouth once daily.  Dispense: 90 tablet; Refill: 3      Follow up in about 6 months (around 6/11/2024), or if symptoms worsen or fail to improve, for medication management.        12/17/2023 Renetta Bunch

## 2023-12-29 ENCOUNTER — TELEPHONE (OUTPATIENT)
Dept: FAMILY MEDICINE | Facility: CLINIC | Age: 48
End: 2023-12-29
Payer: COMMERCIAL

## 2024-01-15 ENCOUNTER — TELEPHONE (OUTPATIENT)
Dept: FAMILY MEDICINE | Facility: CLINIC | Age: 49
End: 2024-01-15
Payer: COMMERCIAL

## 2024-01-15 DIAGNOSIS — Z12.31 SCREENING MAMMOGRAM FOR HIGH-RISK PATIENT: Primary | ICD-10-CM

## 2024-01-15 NOTE — TELEPHONE ENCOUNTER
----- Message from Beto Gillis sent at 1/12/2024  3:09 PM CST -----  Regarding: payable code needed  Good Afternoon ,     The above mentioned pt has a mammo scheduled 1/18 and the code z12.30 is not a payable code and does not meet medical necessity and may not be covered by the patient's insurance. A new order must be provided prior to the patient's appointment.  Please advise if the patient needs to be rescheduled or cancelled.  If I can be of any assistance, please call me at [phone]        Thank you,   Beto Gillis  Pre-Service

## 2024-01-18 ENCOUNTER — HOSPITAL ENCOUNTER (OUTPATIENT)
Dept: RADIOLOGY | Facility: HOSPITAL | Age: 49
Discharge: HOME | End: 2024-01-18
Attending: NURSE PRACTITIONER
Payer: COMMERCIAL

## 2024-01-18 DIAGNOSIS — Z12.39 ENCOUNTER FOR SCREENING FOR MALIGNANT NEOPLASM OF BREAST, UNSPECIFIED SCREENING MODALITY: ICD-10-CM

## 2024-01-18 PROCEDURE — 77067 SCR MAMMO BI INCL CAD: CPT | Mod: TC,PO

## 2024-01-22 ENCOUNTER — TELEPHONE (OUTPATIENT)
Dept: FAMILY MEDICINE | Facility: CLINIC | Age: 49
End: 2024-01-22
Payer: COMMERCIAL

## 2024-01-22 NOTE — TELEPHONE ENCOUNTER
----- Message from Renetta Bunch NP sent at 1/20/2024  5:11 PM CST -----  Negative mammogram. Repeat in 1 year

## 2024-06-05 ENCOUNTER — TELEPHONE (OUTPATIENT)
Dept: FAMILY MEDICINE | Facility: CLINIC | Age: 49
End: 2024-06-05
Payer: COMMERCIAL

## 2024-06-05 DIAGNOSIS — Z79.899 ENCOUNTER FOR LONG-TERM (CURRENT) USE OF OTHER MEDICATIONS: ICD-10-CM

## 2024-06-05 DIAGNOSIS — I10 HYPERTENSION, UNSPECIFIED TYPE: Primary | ICD-10-CM

## 2024-06-05 DIAGNOSIS — E78.5 HYPERLIPIDEMIA, UNSPECIFIED HYPERLIPIDEMIA TYPE: ICD-10-CM

## 2024-06-11 LAB
ALBUMIN SERPL-MCNC: 4.4 G/DL (ref 3.6–5.1)
ALBUMIN/CREAT UR: NORMAL MG/G CREAT
ALBUMIN/GLOB SERPL: 1.6 (CALC) (ref 1–2.5)
ALP SERPL-CCNC: 66 U/L (ref 31–125)
ALT SERPL-CCNC: 42 U/L (ref 6–29)
APPEARANCE UR: CLEAR
AST SERPL-CCNC: 36 U/L (ref 10–35)
BACTERIA #/AREA URNS HPF: NORMAL /HPF
BACTERIA UR CULT: NORMAL
BASOPHILS # BLD AUTO: 83 CELLS/UL (ref 0–200)
BASOPHILS NFR BLD AUTO: 1.2 %
BILIRUB SERPL-MCNC: 0.5 MG/DL (ref 0.2–1.2)
BILIRUB UR QL STRIP: NEGATIVE
BUN SERPL-MCNC: 14 MG/DL (ref 7–25)
BUN/CREAT SERPL: ABNORMAL (CALC) (ref 6–22)
CALCIUM SERPL-MCNC: 9.5 MG/DL (ref 8.6–10.2)
CAOX CRY #/AREA URNS HPF: NORMAL /HPF
CHLORIDE SERPL-SCNC: 106 MMOL/L (ref 98–110)
CHOLEST SERPL-MCNC: 159 MG/DL
CHOLEST/HDLC SERPL: 3.3 (CALC)
CO2 SERPL-SCNC: 28 MMOL/L (ref 20–32)
COLOR UR: YELLOW
CREAT SERPL-MCNC: 0.66 MG/DL (ref 0.5–0.99)
CREAT UR-MCNC: 117 MG/DL (ref 20–275)
EGFR: 109 ML/MIN/1.73M2
EOSINOPHIL # BLD AUTO: 490 CELLS/UL (ref 15–500)
EOSINOPHIL NFR BLD AUTO: 7.1 %
ERYTHROCYTE [DISTWIDTH] IN BLOOD BY AUTOMATED COUNT: 12.5 % (ref 11–15)
GLOBULIN SER CALC-MCNC: 2.7 G/DL (CALC) (ref 1.9–3.7)
GLUCOSE SERPL-MCNC: 96 MG/DL (ref 65–99)
GLUCOSE UR QL STRIP: NEGATIVE
HCT VFR BLD AUTO: 40.6 % (ref 35–45)
HDLC SERPL-MCNC: 48 MG/DL
HGB BLD-MCNC: 13.5 G/DL (ref 11.7–15.5)
HGB UR QL STRIP: NEGATIVE
HYALINE CASTS #/AREA URNS LPF: NORMAL /LPF
KETONES UR QL STRIP: NEGATIVE
LDLC SERPL CALC-MCNC: 88 MG/DL (CALC)
LEUKOCYTE ESTERASE UR QL STRIP: NEGATIVE
LYMPHOCYTES # BLD AUTO: 2450 CELLS/UL (ref 850–3900)
LYMPHOCYTES NFR BLD AUTO: 35.5 %
MCH RBC QN AUTO: 28.4 PG (ref 27–33)
MCHC RBC AUTO-ENTMCNC: 33.3 G/DL (ref 32–36)
MCV RBC AUTO: 85.5 FL (ref 80–100)
MICROALBUMIN UR-MCNC: <0.2 MG/DL
MONOCYTES # BLD AUTO: 455 CELLS/UL (ref 200–950)
MONOCYTES NFR BLD AUTO: 6.6 %
NEUTROPHILS # BLD AUTO: 3422 CELLS/UL (ref 1500–7800)
NEUTROPHILS NFR BLD AUTO: 49.6 %
NITRITE UR QL STRIP: NEGATIVE
NONHDLC SERPL-MCNC: 111 MG/DL (CALC)
PH UR STRIP: 5.5 [PH] (ref 5–8)
PLATELET # BLD AUTO: 265 THOUSAND/UL (ref 140–400)
PMV BLD REES-ECKER: 9.5 FL (ref 7.5–12.5)
POTASSIUM SERPL-SCNC: 4.2 MMOL/L (ref 3.5–5.3)
PROT SERPL-MCNC: 7.1 G/DL (ref 6.1–8.1)
PROT UR QL STRIP: NEGATIVE
RBC # BLD AUTO: 4.75 MILLION/UL (ref 3.8–5.1)
RBC #/AREA URNS HPF: NORMAL /HPF
SERVICE CMNT-IMP: NORMAL
SODIUM SERPL-SCNC: 142 MMOL/L (ref 135–146)
SP GR UR STRIP: 1.02 (ref 1–1.03)
SQUAMOUS #/AREA URNS HPF: NORMAL /HPF
TRIGL SERPL-MCNC: 133 MG/DL
TSH SERPL-ACNC: 3.27 MIU/L
WBC # BLD AUTO: 6.9 THOUSAND/UL (ref 3.8–10.8)
WBC #/AREA URNS HPF: NORMAL /HPF

## 2024-06-18 ENCOUNTER — OFFICE VISIT (OUTPATIENT)
Dept: FAMILY MEDICINE | Facility: CLINIC | Age: 49
End: 2024-06-18
Payer: COMMERCIAL

## 2024-06-18 ENCOUNTER — PATIENT MESSAGE (OUTPATIENT)
Dept: FAMILY MEDICINE | Facility: CLINIC | Age: 49
End: 2024-06-18

## 2024-06-18 DIAGNOSIS — I10 ESSENTIAL HYPERTENSION: Primary | ICD-10-CM

## 2024-06-18 DIAGNOSIS — Z79.899 HIGH RISK MEDICATION USE: ICD-10-CM

## 2024-06-18 DIAGNOSIS — R74.8 ELEVATED LIVER ENZYMES: ICD-10-CM

## 2024-06-18 DIAGNOSIS — E66.01 MORBID OBESITY: ICD-10-CM

## 2024-06-18 DIAGNOSIS — F32.A DEPRESSION, UNSPECIFIED DEPRESSION TYPE: ICD-10-CM

## 2024-06-18 DIAGNOSIS — E78.5 HYPERLIPIDEMIA, UNSPECIFIED HYPERLIPIDEMIA TYPE: ICD-10-CM

## 2024-06-18 PROCEDURE — 3008F BODY MASS INDEX DOCD: CPT | Mod: CPTII,S$GLB,, | Performed by: NURSE PRACTITIONER

## 2024-06-18 PROCEDURE — 1159F MED LIST DOCD IN RCRD: CPT | Mod: CPTII,S$GLB,, | Performed by: NURSE PRACTITIONER

## 2024-06-18 PROCEDURE — 3061F NEG MICROALBUMINURIA REV: CPT | Mod: CPTII,S$GLB,, | Performed by: NURSE PRACTITIONER

## 2024-06-18 PROCEDURE — 4010F ACE/ARB THERAPY RXD/TAKEN: CPT | Mod: CPTII,S$GLB,, | Performed by: NURSE PRACTITIONER

## 2024-06-18 PROCEDURE — 3079F DIAST BP 80-89 MM HG: CPT | Mod: CPTII,S$GLB,, | Performed by: NURSE PRACTITIONER

## 2024-06-18 PROCEDURE — 3066F NEPHROPATHY DOC TX: CPT | Mod: CPTII,S$GLB,, | Performed by: NURSE PRACTITIONER

## 2024-06-18 PROCEDURE — 99214 OFFICE O/P EST MOD 30 MIN: CPT | Mod: S$GLB,,, | Performed by: NURSE PRACTITIONER

## 2024-06-18 PROCEDURE — 1160F RVW MEDS BY RX/DR IN RCRD: CPT | Mod: CPTII,S$GLB,, | Performed by: NURSE PRACTITIONER

## 2024-06-18 PROCEDURE — 3074F SYST BP LT 130 MM HG: CPT | Mod: CPTII,S$GLB,, | Performed by: NURSE PRACTITIONER

## 2024-06-18 RX ORDER — FLUOXETINE 10 MG/1
10 CAPSULE ORAL 2 TIMES DAILY
Qty: 180 CAPSULE | Refills: 1 | Status: SHIPPED | OUTPATIENT
Start: 2024-06-18

## 2024-06-18 RX ORDER — ESCITALOPRAM OXALATE 20 MG/1
20 TABLET ORAL DAILY
Qty: 90 TABLET | Refills: 3 | Status: CANCELLED | OUTPATIENT
Start: 2024-06-18

## 2024-06-18 RX ORDER — FLUOXETINE 10 MG/1
10 CAPSULE ORAL 2 TIMES DAILY
Qty: 60 CAPSULE | Refills: 11 | Status: SHIPPED | OUTPATIENT
Start: 2024-06-18 | End: 2024-06-18 | Stop reason: SDUPTHER

## 2024-06-18 NOTE — PROGRESS NOTES
SUBJECTIVE:    Patient ID: Zelda Acevedo is a 48 y.o. female.    Chief Complaint: Follow-up (Med management // Bottles brought // refills needed // -ERL)    48 year oldpresents for check up.  Treated for hypertension depression, and hyperlipidemia. Taking meds as prescribed.not watching diet as much. Blood pressure in office is well controlled. Recently had labs. Would like to discuss results. Reports having issues with hot flashes lately. Feels on edge when occurs. Wants to discuss options.     Follow-up  Pertinent negatives include no arthralgias, chest pain, headaches, joint swelling, neck pain, vomiting or weakness.       Telephone on 06/05/2024   Component Date Value Ref Range Status    Creatinine, Urine 06/08/2024 117  20 - 275 mg/dL Final    Microalb, Ur 06/08/2024 <0.2  See Note: mg/dL Final    Microalb/Creat Ratio 06/08/2024 NOTE  <30 mg/g creat Final    Cholesterol 06/08/2024 159  <200 mg/dL Final    HDL 06/08/2024 48 (L)  > OR = 50 mg/dL Final    Triglycerides 06/08/2024 133  <150 mg/dL Final    LDL Cholesterol 06/08/2024 88  mg/dL (calc) Final    HDL/Cholesterol Ratio 06/08/2024 3.3  <5.0 (calc) Final    Non HDL Chol. (LDL+VLDL) 06/08/2024 111  <130 mg/dL (calc) Final    Glucose 06/08/2024 96  65 - 99 mg/dL Final    BUN 06/08/2024 14  7 - 25 mg/dL Final    Creatinine 06/08/2024 0.66  0.50 - 0.99 mg/dL Final    eGFR 06/08/2024 109  > OR = 60 mL/min/1.73m2 Final    BUN/Creatinine Ratio 06/08/2024 SEE NOTE:  6 - 22 (calc) Final    Sodium 06/08/2024 142  135 - 146 mmol/L Final    Potassium 06/08/2024 4.2  3.5 - 5.3 mmol/L Final    Chloride 06/08/2024 106  98 - 110 mmol/L Final    CO2 06/08/2024 28  20 - 32 mmol/L Final    Calcium 06/08/2024 9.5  8.6 - 10.2 mg/dL Final    Total Protein 06/08/2024 7.1  6.1 - 8.1 g/dL Final    Albumin 06/08/2024 4.4  3.6 - 5.1 g/dL Final    Globulin, Total 06/08/2024 2.7  1.9 - 3.7 g/dL (calc) Final    Albumin/Globulin Ratio 06/08/2024 1.6  1.0 - 2.5 (calc) Final    Total  Bilirubin 06/08/2024 0.5  0.2 - 1.2 mg/dL Final    Alkaline Phosphatase 06/08/2024 66  31 - 125 U/L Final    AST 06/08/2024 36 (H)  10 - 35 U/L Final    ALT 06/08/2024 42 (H)  6 - 29 U/L Final    WBC 06/08/2024 6.9  3.8 - 10.8 Thousand/uL Final    RBC 06/08/2024 4.75  3.80 - 5.10 Million/uL Final    Hemoglobin 06/08/2024 13.5  11.7 - 15.5 g/dL Final    Hematocrit 06/08/2024 40.6  35.0 - 45.0 % Final    MCV 06/08/2024 85.5  80.0 - 100.0 fL Final    MCH 06/08/2024 28.4  27.0 - 33.0 pg Final    MCHC 06/08/2024 33.3  32.0 - 36.0 g/dL Final    RDW 06/08/2024 12.5  11.0 - 15.0 % Final    Platelets 06/08/2024 265  140 - 400 Thousand/uL Final    MPV 06/08/2024 9.5  7.5 - 12.5 fL Final    Neutrophils, Abs 06/08/2024 3,422  1,500 - 7,800 cells/uL Final    Lymph # 06/08/2024 2,450  850 - 3,900 cells/uL Final    Mono # 06/08/2024 455  200 - 950 cells/uL Final    Eos # 06/08/2024 490  15 - 500 cells/uL Final    Baso # 06/08/2024 83  0 - 200 cells/uL Final    Neutrophils Relative 06/08/2024 49.6  % Final    Lymph % 06/08/2024 35.5  % Final    Mono % 06/08/2024 6.6  % Final    Eosinophil % 06/08/2024 7.1  % Final    Basophil % 06/08/2024 1.2  % Final    TSH w/reflex to FT4 06/08/2024 3.27  mIU/L Final    Color, UA 06/08/2024 YELLOW  YELLOW Final    Appearance, UA 06/08/2024 CLEAR  CLEAR Final    Specific Gravity, UA 06/08/2024 1.017  1.001 - 1.035 Final    pH, UA 06/08/2024 5.5  5.0 - 8.0 Final    Glucose, UA 06/08/2024 NEGATIVE  NEGATIVE Final    Bilirubin, UA 06/08/2024 NEGATIVE  NEGATIVE Final    Ketones, UA 06/08/2024 NEGATIVE  NEGATIVE Final    Occult Blood UA 06/08/2024 NEGATIVE  NEGATIVE Final    Protein, UA 06/08/2024 NEGATIVE  NEGATIVE Final    Nitrite, UA 06/08/2024 NEGATIVE  NEGATIVE Final    Leukocytes, UA 06/08/2024 NEGATIVE  NEGATIVE Final    WBC Casts, UA 06/08/2024 NONE SEEN  < OR = 5 /HPF Final    RBC Casts, UA 06/08/2024 NONE SEEN  < OR = 2 /HPF Final    Squam Epithel, UA 06/08/2024 0-5  < OR = 5 /HPF Final     Bacteria, UA 2024 NONE SEEN  NONE SEEN /HPF Final    Ca Oxalate [place], UA 2024 FEW  NONE OR FEW /HPF Final    Hyaline Casts, UA 2024 NONE SEEN  NONE SEEN /LPF Final    Service Cmt: 2024 SEE COMMENT   Final    Reflexive Urine Culture 2024 SEE COMMENT   Final       History reviewed. No pertinent past medical history.  Social History     Socioeconomic History    Marital status:    Tobacco Use    Smoking status: Never    Smokeless tobacco: Never   Substance and Sexual Activity    Alcohol use: Yes     Alcohol/week: 1.0 standard drink of alcohol     Types: 1 Standard drinks or equivalent per week     Comment: This may only be one to two times a month    Drug use: Never    Sexual activity: Not Currently     Partners: Male     Birth control/protection: Abstinence     Social Determinants of Health     Financial Resource Strain: Low Risk  (2024)    Overall Financial Resource Strain (CARDIA)     Difficulty of Paying Living Expenses: Not hard at all   Food Insecurity: No Food Insecurity (2024)    Hunger Vital Sign     Worried About Running Out of Food in the Last Year: Never true     Ran Out of Food in the Last Year: Never true   Transportation Needs: No Transportation Needs (2022)    PRAPARE - Transportation     Lack of Transportation (Medical): No     Lack of Transportation (Non-Medical): No   Physical Activity: Inactive (2024)    Exercise Vital Sign     Days of Exercise per Week: 0 days     Minutes of Exercise per Session: 0 min   Stress: No Stress Concern Present (2024)    [employer] - Occupational Stress Questionnaire     Feeling of Stress : Only a little   Housing Stability: Low Risk  (2022)    Housing Stability Vital Sign     Unable to Pay for Housing in the Last Year: No     Number of Places Lived in the Last Year: 1     Unstable Housing in the Last Year: No     Past Surgical History:   Procedure Laterality Date      SECTION       SECTION      TONSILLECTOMY       Family History   Problem Relation Name Age of Onset    Arthritis Mother Emilie Gomez     Arthritis Father Albert Gomez     Cancer Father Albert Gomez         Melnoma cancer of the eye    Hearing loss Father Albert Gomez     Cancer Sister Ana Paula         Skin cancer       Tests to Keep You Healthy    Mammogram: Met on 2024  Colon Cancer Screening: Met on 2022  Cervical Cancer Screening: DUE  Last Blood Pressure <= 139/89 (2024): Yes      Review of patient's allergies indicates:  No Known Allergies    Current Outpatient Medications:     cetirizine (ZYRTEC) 10 MG tablet, Take 10 mg by mouth once daily., Disp: , Rfl:     EScitalopram oxalate (LEXAPRO) 20 MG tablet, Take 1 tablet (20 mg total) by mouth once daily., Disp: 90 tablet, Rfl: 3    lisinopriL 10 MG tablet, Take 1 tablet (10 mg total) by mouth once daily., Disp: 90 tablet, Rfl: 3    rosuvastatin (CRESTOR) 20 MG tablet, Take 1 tablet (20 mg total) by mouth once daily., Disp: 90 tablet, Rfl: 3    soy isofla/blk cohosh/mag bark (ESTROVEN ORAL), Take by mouth once daily., Disp: , Rfl:     triamcinolone acetonide 0.1% (KENALOG) 0.1 % cream, Apply topically 2 (two) times daily., Disp: 15 g, Rfl: 0    UNABLE TO FIND, medication name: Danilo Women's Multi Gummy, Disp: , Rfl:     UNABLE TO FIND, Take 1 mL by mouth once daily. medication name: Milk Thistle Extract 1000 mg, dissolve in water., Disp: , Rfl:     valACYclovir (VALTREX) 1000 MG tablet, Take 2 tablets (2,000 mg total) by mouth every 12 (twelve) hours., Disp: 20 tablet, Rfl: 0    FLUoxetine 10 MG capsule, Take 1 capsule (10 mg total) by mouth 2 (two) times daily., Disp: 180 capsule, Rfl: 1    multivitamin/iron/folic acid (CENTRUM WOMEN ORAL), Take by mouth., Disp: , Rfl:     Review of Systems   Constitutional:  Negative for activity change and unexpected weight change.   HENT:  Negative for hearing loss, rhinorrhea and trouble swallowing.   "  Eyes:  Negative for discharge and visual disturbance.   Respiratory:  Negative for chest tightness and wheezing.    Cardiovascular:  Negative for chest pain and palpitations.   Gastrointestinal:  Negative for blood in stool, constipation, diarrhea and vomiting.   Endocrine: Negative for polydipsia and polyuria.   Genitourinary:  Negative for difficulty urinating, dysuria, hematuria and menstrual problem.   Musculoskeletal:  Negative for arthralgias, joint swelling and neck pain.   Neurological:  Negative for weakness and headaches.   Psychiatric/Behavioral:  Negative for confusion and dysphoric mood.           Objective:      Vitals:    06/18/24 0824 06/18/24 0852   BP: (!) 132/90 126/86   Pulse: 91    SpO2: 98%    Weight: 95 kg (209 lb 8 oz)    Height: 5' 1" (1.549 m)      Physical Exam  Vitals and nursing note reviewed.   Constitutional:       General: She is not in acute distress.     Appearance: Normal appearance. She is well-developed.   HENT:      Head: Normocephalic.      Right Ear: External ear normal.      Left Ear: External ear normal.   Eyes:      Conjunctiva/sclera: Conjunctivae normal.      Pupils: Pupils are equal, round, and reactive to light.   Neck:      Vascular: No JVD.   Cardiovascular:      Rate and Rhythm: Normal rate and regular rhythm.      Heart sounds: No murmur heard.  Pulmonary:      Effort: Pulmonary effort is normal.      Breath sounds: Normal breath sounds.   Abdominal:      General: Bowel sounds are normal.      Palpations: Abdomen is soft.   Musculoskeletal:         General: No deformity. Normal range of motion.      Cervical back: Normal range of motion and neck supple.   Lymphadenopathy:      Cervical: No cervical adenopathy.   Skin:     General: Skin is warm and dry.      Findings: No rash.   Neurological:      Mental Status: She is alert and oriented to person, place, and time.      Gait: Gait normal.   Psychiatric:         Speech: Speech normal.         Behavior: Behavior " normal.           Assessment:       1. Essential hypertension    2. Depression, unspecified depression type    3. Morbid obesity    4. High risk medication use    5. Elevated liver enzymes    6. Hyperlipidemia, unspecified hyperlipidemia type         Plan:       Essential hypertension  Comments:  bp controlled    Depression, unspecified depression type  Comments:  wean off of lexapro. start prozac    Morbid obesity    High risk medication use    Elevated liver enzymes  Comments:  repeat labs in 3 months. started milk thistle  Orders:  -     Cancel: Hepatitis Panel, Acute; Future; Expected date: 06/18/2024  -     COMPREHENSIVE METABOLIC PANEL; Future; Expected date: 06/18/2024    Hyperlipidemia, unspecified hyperlipidemia type  Comments:  crestor    Other orders  -     Discontinue: FLUoxetine 10 MG capsule; Take 1 capsule (10 mg total) by mouth 2 (two) times daily.  Dispense: 60 capsule; Refill: 11      Follow up in about 6 months (around 12/18/2024), or if symptoms worsen or fail to improve, for medication management.        6/23/2024 Renetta Bunch

## 2024-06-22 VITALS
BODY MASS INDEX: 39.55 KG/M2 | DIASTOLIC BLOOD PRESSURE: 86 MMHG | HEART RATE: 91 BPM | WEIGHT: 209.5 LBS | OXYGEN SATURATION: 98 % | SYSTOLIC BLOOD PRESSURE: 126 MMHG | HEIGHT: 61 IN

## 2024-09-25 ENCOUNTER — TELEPHONE (OUTPATIENT)
Dept: FAMILY MEDICINE | Facility: CLINIC | Age: 49
End: 2024-09-25
Payer: COMMERCIAL

## 2024-09-28 ENCOUNTER — OFFICE VISIT (OUTPATIENT)
Dept: URGENT CARE | Facility: CLINIC | Age: 49
End: 2024-09-28
Payer: COMMERCIAL

## 2024-09-28 VITALS
TEMPERATURE: 97 F | HEART RATE: 77 BPM | HEIGHT: 61 IN | DIASTOLIC BLOOD PRESSURE: 85 MMHG | RESPIRATION RATE: 16 BRPM | OXYGEN SATURATION: 98 % | SYSTOLIC BLOOD PRESSURE: 124 MMHG | WEIGHT: 204 LBS | BODY MASS INDEX: 38.51 KG/M2

## 2024-09-28 DIAGNOSIS — M25.562 LEFT MEDIAL KNEE PAIN: Primary | ICD-10-CM

## 2024-09-28 DIAGNOSIS — S83.92XA SPRAIN OF LEFT KNEE, UNSPECIFIED LIGAMENT, INITIAL ENCOUNTER: ICD-10-CM

## 2024-09-28 PROCEDURE — 73562 X-RAY EXAM OF KNEE 3: CPT | Mod: LT,S$GLB,, | Performed by: RADIOLOGY

## 2024-09-28 PROCEDURE — 99213 OFFICE O/P EST LOW 20 MIN: CPT | Mod: S$GLB,,, | Performed by: NURSE PRACTITIONER

## 2024-09-28 NOTE — PROGRESS NOTES
"Subjective:      Patient ID: Zelda Acevedo is a 49 y.o. female.    Vitals:  height is 5' 1" (1.549 m) and weight is 92.5 kg (204 lb). Her oral temperature is 97.2 °F (36.2 °C). Her blood pressure is 124/85 and her pulse is 77. Her respiration is 16 and oxygen saturation is 98%.     Chief Complaint: Knee Pain (left)    On and off pain and discomfort left knee for 2 months, denies precipitating event or occurrence or known injury or trauma.  Denies history of similar occurrences in the past prior to 1st notice of symptoms in August.  States that she stepped down into a hole in the yd believes to have twisted it proximally 1 week and is continuing to have significant pain with swelling mostly to the inner knee exacerbated with certain movements/positions.    Knee Pain   The incident occurred more than 1 week ago. The incident occurred in the yard. The injury mechanism was a twisting injury (stepped in a hole cutting grass). The pain is present in the left knee. The pain is at a severity of 9/10. The pain is severe. The pain has been Worsening since onset. Associated symptoms include an inability to bear weight. Pertinent negatives include no numbness. She reports no foreign bodies present. The symptoms are aggravated by movement and weight bearing. She has tried elevation for the symptoms. The treatment provided no relief.       Musculoskeletal:  Positive for joint pain and joint swelling. Negative for trauma and abnormal ROM of joint.   Skin:  Negative for rash, wound, lesion, erythema and bruising.   Neurological:  Negative for numbness and tingling.      Objective:     Physical Exam   Constitutional: She is oriented to person, place, and time.  Non-toxic appearance. She does not appear ill. No distress.   HENT:   Head: Normocephalic and atraumatic.   Nose: Nose normal.   Mouth/Throat: Mucous membranes are moist.   Eyes: Conjunctivae are normal.   Cardiovascular: Normal rate.   Pulmonary/Chest: Effort normal. No " "respiratory distress.   Abdominal: Normal appearance.   Musculoskeletal: Normal range of motion.         General: Tenderness present. No swelling, deformity or signs of injury. Normal range of motion.      Left knee: She exhibits normal range of motion, no swelling, no ecchymosis, no erythema and no bony tenderness. Tenderness found. Medial joint line tenderness noted.   Neurological: no focal deficit. She is alert and oriented to person, place, and time.   Skin: Skin is warm, dry and no rash. Capillary refill takes 2 to 3 seconds. No bruising, No erythema and No lesion   Psychiatric: Her behavior is normal. Mood normal.   Nursing note and vitals reviewed.      Assessment:     1. Left medial knee pain    2. Sprain of left knee, unspecified ligament, initial encounter      Left knee xray"  FINDINGS:  There is no evidence fracture or malalignment.  The adjacent soft tissues are unremarkable.     Impression:     There is no evidence acute bony injury of the left knee.  Plan:       Left medial knee pain  -     XR KNEE 3 VIEW LEFT; Future; Expected date: 09/28/2024    Sprain of left knee, unspecified ligament, initial encounter  -     Ambulatory referral/consult to Orthopedics                    "

## 2024-09-28 NOTE — PATIENT INSTRUCTIONS
Purchased knee brace and wear at all times when up and ambulatory.    Ibuprofen or Alleve over-the-counter as directed as needed for pain.    Keep leg elevated as much as possible and limit ambulation.    Referral was placed to Orthopedics, someone should be calling you to schedule an appointment

## 2024-10-02 ENCOUNTER — OFFICE VISIT (OUTPATIENT)
Dept: ORTHOPEDICS | Facility: CLINIC | Age: 49
End: 2024-10-02
Payer: COMMERCIAL

## 2024-10-02 VITALS — BODY MASS INDEX: 38.71 KG/M2 | HEIGHT: 61 IN | WEIGHT: 205 LBS

## 2024-10-02 DIAGNOSIS — M25.562 LEFT KNEE PAIN, UNSPECIFIED CHRONICITY: Primary | ICD-10-CM

## 2024-10-02 PROCEDURE — 1160F RVW MEDS BY RX/DR IN RCRD: CPT | Mod: CPTII,S$GLB,, | Performed by: ORTHOPAEDIC SURGERY

## 2024-10-02 PROCEDURE — 3008F BODY MASS INDEX DOCD: CPT | Mod: CPTII,S$GLB,, | Performed by: ORTHOPAEDIC SURGERY

## 2024-10-02 PROCEDURE — 3061F NEG MICROALBUMINURIA REV: CPT | Mod: CPTII,S$GLB,, | Performed by: ORTHOPAEDIC SURGERY

## 2024-10-02 PROCEDURE — 4010F ACE/ARB THERAPY RXD/TAKEN: CPT | Mod: CPTII,S$GLB,, | Performed by: ORTHOPAEDIC SURGERY

## 2024-10-02 PROCEDURE — 1159F MED LIST DOCD IN RCRD: CPT | Mod: CPTII,S$GLB,, | Performed by: ORTHOPAEDIC SURGERY

## 2024-10-02 PROCEDURE — 99204 OFFICE O/P NEW MOD 45 MIN: CPT | Mod: S$GLB,,, | Performed by: ORTHOPAEDIC SURGERY

## 2024-10-02 PROCEDURE — 3066F NEPHROPATHY DOC TX: CPT | Mod: CPTII,S$GLB,, | Performed by: ORTHOPAEDIC SURGERY

## 2024-10-02 PROCEDURE — 99999 PR PBB SHADOW E&M-EST. PATIENT-LVL III: CPT | Mod: PBBFAC,,, | Performed by: ORTHOPAEDIC SURGERY

## 2024-10-02 RX ORDER — MELOXICAM 15 MG/1
15 TABLET ORAL DAILY
Qty: 30 TABLET | Refills: 1 | Status: SHIPPED | OUTPATIENT
Start: 2024-10-02

## 2024-10-02 NOTE — PROGRESS NOTES
Subjective:      Patient ID: Zelda Acevedo is a 49 y.o. female.    Chief Complaint: Pain of the Left Knee    HPI  49-year-old female one-month history of left knee pain.  There was no antecedent trauma.  Pain was recently aggravated several weeks ago when she stepped into a hole may have twisted her knee.  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  She has recently tried some NSAIDs with some improvement over the weekend..  Recently seen by her PCP referred for further evaluation  ROS      Objective:    Ortho Exam     Constitutional:   Patient is alert  and oriented in no acute distress  HEENT:  normocephalic atraumatic; PERRL EOMI  Neck:  Supple without adenopathy  Cardiovascular:  Normal rate and rhythm  Pulmonary:  Normal respiratory effort normal chest wall expansion  Abdominal:  Nonprotuberant nondistended  Musculoskeletal:  Patient has a mildly antalgic gait  Difficulty with full extension diffuse medial tenderness Has no gross instability  There is no increased warmth or erythema somewhat cooperative with Salud's testing secondary to pain  Neurological:  No focal defect; cranial nerves 2-12 grossly intact  Psychiatric/behavioral:  Mood and behavior normal      XR KNEE 3 VIEW LEFT  Narrative: EXAMINATION:  XR KNEE 3 VIEW LEFT    CLINICAL HISTORY:  Pain in left knee    TECHNIQUE:  AP, lateral, and Merchant views of the left knee were performed.    COMPARISON:  None    FINDINGS:  There is no evidence fracture or malalignment.  The adjacent soft tissues are unremarkable.  Impression: There is no evidence acute bony injury of the left knee.    Electronically signed by: Rosalva Navarrete MD  Date:    09/28/2024  Time:    11:13       My Radiographs Findings:    Patient has early degenerative changes of the left knee without acute osseous abnormalities  Assessment:       Encounter Diagnosis   Name Primary?    Left knee pain, unspecified chronicity Yes         Plan:       I have discussed medical condition  treatment options with her at length.  She is content with a trial of conservative treatment to include activity restrictions rehab exercises and anti-inflammatory medicines.  She states that her workplace provides virtual physical therapy which she would like to try.  If symptoms fail to improve we certainly could consider an MRI or an injection into her knee as the wrist some suspicion of meniscal pathology.  Follow up otherwise can be as needed she may advance activities as tolerated.        History reviewed. No pertinent past medical history.  Past Surgical History:   Procedure Laterality Date     SECTION       SECTION      TONSILLECTOMY           Current Outpatient Medications:     cetirizine (ZYRTEC) 10 MG tablet, Take 10 mg by mouth once daily., Disp: , Rfl:     EScitalopram oxalate (LEXAPRO) 20 MG tablet, Take 1 tablet (20 mg total) by mouth once daily., Disp: 90 tablet, Rfl: 3    FLUoxetine 10 MG capsule, Take 1 capsule (10 mg total) by mouth 2 (two) times daily., Disp: 180 capsule, Rfl: 1    lisinopriL 10 MG tablet, Take 1 tablet (10 mg total) by mouth once daily., Disp: 90 tablet, Rfl: 3    rosuvastatin (CRESTOR) 20 MG tablet, Take 1 tablet (20 mg total) by mouth once daily., Disp: 90 tablet, Rfl: 3    soy isofla/blk cohosh/mag bark (ESTROVEN ORAL), Take by mouth once daily., Disp: , Rfl:     triamcinolone acetonide 0.1% (KENALOG) 0.1 % cream, Apply topically 2 (two) times daily., Disp: 15 g, Rfl: 0    UNABLE TO FIND, medication name: Danilo Women's Multi Gummy, Disp: , Rfl:     UNABLE TO FIND, Take 1 mL by mouth once daily. medication name: Milk Thistle Extract 1000 mg, dissolve in water., Disp: , Rfl:     meloxicam (MOBIC) 15 MG tablet, Take 1 tablet (15 mg total) by mouth once daily., Disp: 30 tablet, Rfl: 1    valACYclovir (VALTREX) 1000 MG tablet, Take 2 tablets (2,000 mg total) by mouth every 12 (twelve) hours., Disp: 20 tablet, Rfl: 0    Review of patient's allergies indicates:  No  Known Allergies    Family History   Problem Relation Name Age of Onset    Arthritis Mother Emilie Gomez     Arthritis Father Albert Gomez     Cancer Father Albert Gomez         Melnoma cancer of the eye    Hearing loss Father Albert Gomez     Cancer Sister Ana Paula         Skin cancer     Social History     Occupational History    Not on file   Tobacco Use    Smoking status: Never     Passive exposure: Never    Smokeless tobacco: Never   Substance and Sexual Activity    Alcohol use: Yes     Alcohol/week: 1.0 standard drink of alcohol     Types: 1 Unspecified drink type per week     Comment: This may only be one to two times a month    Drug use: Never    Sexual activity: Not Currently     Partners: Male     Birth control/protection: Abstinence

## 2024-10-08 ENCOUNTER — PATIENT MESSAGE (OUTPATIENT)
Dept: FAMILY MEDICINE | Facility: CLINIC | Age: 49
End: 2024-10-08
Payer: COMMERCIAL

## 2024-11-29 DIAGNOSIS — M25.562 LEFT KNEE PAIN, UNSPECIFIED CHRONICITY: ICD-10-CM

## 2024-11-29 NOTE — TELEPHONE ENCOUNTER
Refill request for meloxicam 15 mg tablets sent to [id number] in [place], [place].     Last office visit was 10/02/2024.    GEETA Quintanilla 11/29/2024

## 2024-12-02 RX ORDER — MELOXICAM 15 MG/1
15 TABLET ORAL
Qty: 30 TABLET | Refills: 1 | Status: SHIPPED | OUTPATIENT
Start: 2024-12-02

## 2024-12-03 ENCOUNTER — TELEPHONE (OUTPATIENT)
Dept: FAMILY MEDICINE | Facility: CLINIC | Age: 49
End: 2024-12-03
Payer: COMMERCIAL

## 2024-12-03 DIAGNOSIS — Z79.899 HIGH RISK MEDICATION USE: ICD-10-CM

## 2024-12-03 DIAGNOSIS — E78.5 HYPERLIPIDEMIA, UNSPECIFIED HYPERLIPIDEMIA TYPE: ICD-10-CM

## 2024-12-03 DIAGNOSIS — I10 ESSENTIAL HYPERTENSION: Primary | ICD-10-CM

## 2024-12-18 ENCOUNTER — OFFICE VISIT (OUTPATIENT)
Dept: FAMILY MEDICINE | Facility: CLINIC | Age: 49
End: 2024-12-18
Payer: COMMERCIAL

## 2024-12-18 VITALS
WEIGHT: 197 LBS | BODY MASS INDEX: 37.19 KG/M2 | OXYGEN SATURATION: 100 % | DIASTOLIC BLOOD PRESSURE: 88 MMHG | HEART RATE: 71 BPM | SYSTOLIC BLOOD PRESSURE: 138 MMHG | HEIGHT: 61 IN

## 2024-12-18 DIAGNOSIS — F32.A DEPRESSION, UNSPECIFIED DEPRESSION TYPE: ICD-10-CM

## 2024-12-18 DIAGNOSIS — I10 HYPERTENSION, UNSPECIFIED TYPE: ICD-10-CM

## 2024-12-18 DIAGNOSIS — R74.8 ELEVATED LIVER ENZYMES: ICD-10-CM

## 2024-12-18 DIAGNOSIS — E78.5 HYPERLIPIDEMIA, UNSPECIFIED HYPERLIPIDEMIA TYPE: Primary | ICD-10-CM

## 2024-12-18 DIAGNOSIS — Z23 NEED FOR INFLUENZA VACCINATION: ICD-10-CM

## 2024-12-18 DIAGNOSIS — R10.11 RUQ PAIN: ICD-10-CM

## 2024-12-18 DIAGNOSIS — Z12.31 SCREENING MAMMOGRAM FOR BREAST CANCER: ICD-10-CM

## 2024-12-18 PROCEDURE — 1159F MED LIST DOCD IN RCRD: CPT | Mod: CPTII,S$GLB,, | Performed by: NURSE PRACTITIONER

## 2024-12-18 PROCEDURE — 3066F NEPHROPATHY DOC TX: CPT | Mod: CPTII,S$GLB,, | Performed by: NURSE PRACTITIONER

## 2024-12-18 PROCEDURE — 3061F NEG MICROALBUMINURIA REV: CPT | Mod: CPTII,S$GLB,, | Performed by: NURSE PRACTITIONER

## 2024-12-18 PROCEDURE — 99214 OFFICE O/P EST MOD 30 MIN: CPT | Mod: S$GLB,,, | Performed by: NURSE PRACTITIONER

## 2024-12-18 PROCEDURE — G0008 ADMIN INFLUENZA VIRUS VAC: HCPCS | Mod: S$GLB,,, | Performed by: NURSE PRACTITIONER

## 2024-12-18 PROCEDURE — 1160F RVW MEDS BY RX/DR IN RCRD: CPT | Mod: CPTII,S$GLB,, | Performed by: NURSE PRACTITIONER

## 2024-12-18 PROCEDURE — 3075F SYST BP GE 130 - 139MM HG: CPT | Mod: CPTII,S$GLB,, | Performed by: NURSE PRACTITIONER

## 2024-12-18 PROCEDURE — 4010F ACE/ARB THERAPY RXD/TAKEN: CPT | Mod: CPTII,S$GLB,, | Performed by: NURSE PRACTITIONER

## 2024-12-18 PROCEDURE — 3008F BODY MASS INDEX DOCD: CPT | Mod: CPTII,S$GLB,, | Performed by: NURSE PRACTITIONER

## 2024-12-18 PROCEDURE — 90656 IIV3 VACC NO PRSV 0.5 ML IM: CPT | Mod: S$GLB,,, | Performed by: NURSE PRACTITIONER

## 2024-12-18 PROCEDURE — 3079F DIAST BP 80-89 MM HG: CPT | Mod: CPTII,S$GLB,, | Performed by: NURSE PRACTITIONER

## 2024-12-18 RX ORDER — ESCITALOPRAM OXALATE 20 MG/1
20 TABLET ORAL DAILY
Qty: 90 TABLET | Refills: 3 | Status: SHIPPED | OUTPATIENT
Start: 2024-12-18

## 2024-12-18 RX ORDER — ROSUVASTATIN CALCIUM 20 MG/1
20 TABLET, COATED ORAL DAILY
Qty: 90 TABLET | Refills: 3 | Status: SHIPPED | OUTPATIENT
Start: 2024-12-18 | End: 2025-12-18

## 2024-12-18 RX ORDER — LISINOPRIL 10 MG/1
10 TABLET ORAL DAILY
Qty: 90 TABLET | Refills: 3 | Status: SHIPPED | OUTPATIENT
Start: 2024-12-18

## 2024-12-18 RX ORDER — VALACYCLOVIR HYDROCHLORIDE 1 G/1
2000 TABLET, FILM COATED ORAL EVERY 12 HOURS
Qty: 20 TABLET | Refills: 0 | Status: SHIPPED | OUTPATIENT
Start: 2024-12-18 | End: 2025-12-18

## 2024-12-19 ENCOUNTER — HOSPITAL ENCOUNTER (OUTPATIENT)
Dept: RADIOLOGY | Facility: HOSPITAL | Age: 49
Discharge: HOME | End: 2024-12-19
Attending: NURSE PRACTITIONER
Payer: COMMERCIAL

## 2024-12-19 DIAGNOSIS — R74.8 ELEVATED LIVER ENZYMES: ICD-10-CM

## 2024-12-19 DIAGNOSIS — R10.11 RUQ PAIN: ICD-10-CM

## 2024-12-19 PROCEDURE — 76705 ECHO EXAM OF ABDOMEN: CPT | Mod: 26,,, | Performed by: RADIOLOGY

## 2024-12-19 PROCEDURE — 76705 ECHO EXAM OF ABDOMEN: CPT | Mod: TC,PO

## 2024-12-30 NOTE — PROGRESS NOTES
SUBJECTIVE:    Patient ID: Zelda Acevedo is a 49 y.o. female.    Chief Complaint: Follow-up (Bottles brought//Pt is here for a 6 month follow up//Waiting until the first of the year for Pap//Mammo ordered this visit//KE)    History of Present Illness    CHIEF COMPLAINT:  Zelda presents today for follow up.    WEIGHT MANAGEMENT:  She reports weight loss of 8 lbs since last visit.    MUSCULOSKELETAL:  She reports knee pain with partial improvement from prescribed exercises. She continues Mobic for pain management.    MENOPAUSE:  Her last menstrual period was in June, with prior period in November 2023. She is experiencing hot flashes.    LABS:  ALT was 39, decreased from previous value of 42. Previous hepatitis testing was negative. Lipid panel shows total cholesterol of 173 mg/dL, HDL of 45 mg/dL, and LDL of 102 mg/dL.    SOCIAL HISTORY:  She reports minimal alcohol consumption with only occasional social drinking.    CURRENT MEDICATIONS:  She continues Lexapro, Rosuvastatin, Lisinopril, and takes Valacyclovir as needed.      ROS:  General: -fever, -chills, -fatigue, -weight gain, +weight loss  Eyes: -vision changes, -redness, -discharge  ENT: -ear pain, -nasal congestion, -sore throat  Cardiovascular: -chest pain, -palpitations, -lower extremity edema  Respiratory: -cough, -shortness of breath  Gastrointestinal: -abdominal pain, -nausea, -vomiting, -diarrhea, -constipation, -blood in stool  Genitourinary: -dysuria, -hematuria, -frequency  Musculoskeletal: +joint pain, -muscle pain  Skin: -rash, -lesion  Neurological: -headache, -dizziness, -numbness, -tingling  Psychiatric: -anxiety, -depression, -sleep difficulty  Endocrine: +hot flashes         Telephone on 12/03/2024   Component Date Value Ref Range Status    Glucose 12/16/2024 82  65 - 99 mg/dL Final    BUN 12/16/2024 20  7 - 25 mg/dL Final    Creatinine 12/16/2024 0.57  0.50 - 0.99 mg/dL Final    eGFR 12/16/2024 112  > OR = 60 mL/min/1.73m2 Final     BUN/Creatinine Ratio 12/16/2024 SEE NOTE:  6 - 22 (calc) Final    Sodium 12/16/2024 142  135 - 146 mmol/L Final    Potassium 12/16/2024 3.8  3.5 - 5.3 mmol/L Final    Chloride 12/16/2024 103  98 - 110 mmol/L Final    CO2 12/16/2024 30  20 - 32 mmol/L Final    Calcium 12/16/2024 9.7  8.6 - 10.2 mg/dL Final    Total Protein 12/16/2024 7.5  6.1 - 8.1 g/dL Final    Albumin 12/16/2024 4.8  3.6 - 5.1 g/dL Final    Globulin, Total 12/16/2024 2.7  1.9 - 3.7 g/dL (calc) Final    Albumin/Globulin Ratio 12/16/2024 1.8  1.0 - 2.5 (calc) Final    Total Bilirubin 12/16/2024 0.8  0.2 - 1.2 mg/dL Final    Alkaline Phosphatase 12/16/2024 77  31 - 125 U/L Final    AST 12/16/2024 37 (H)  10 - 35 U/L Final    ALT 12/16/2024 39 (H)  6 - 29 U/L Final    Cholesterol 12/16/2024 173  <200 mg/dL Final    HDL 12/16/2024 45 (L)  > OR = 50 mg/dL Final    Triglycerides 12/16/2024 165 (H)  <150 mg/dL Final    LDL Cholesterol 12/16/2024 102 (H)  mg/dL (calc) Final    HDL/Cholesterol Ratio 12/16/2024 3.8  <5.0 (calc) Final    Non HDL Chol. (LDL+VLDL) 12/16/2024 128  <130 mg/dL (calc) Final    Creatinine, Urine 12/16/2024 169  20 - 275 mg/dL Final    Microalb, Ur 12/16/2024 1.1  See Note: mg/dL Final    Microalb/Creat Ratio 12/16/2024 7  <30 mg/g creat Final       History reviewed. No pertinent past medical history.  Social History     Socioeconomic History    Marital status:    Tobacco Use    Smoking status: Never     Passive exposure: Never    Smokeless tobacco: Never   Substance and Sexual Activity    Alcohol use: Yes     Alcohol/week: 1.0 standard drink of alcohol     Types: 1 Unspecified drink type per week     Comment: This may only be one to two times a month    Drug use: Never    Sexual activity: Not Currently     Partners: Male     Birth control/protection: Abstinence     Social Drivers of Health     Financial Resource Strain: Low Risk  (6/18/2024)    Overall Financial Resource Strain (CARDIA)     Difficulty of Paying Living  Expenses: Not hard at all   Food Insecurity: No Food Insecurity (2024)    Hunger Vital Sign     Worried About Running Out of Food in the Last Year: Never true     Ran Out of Food in the Last Year: Never true   Transportation Needs: No Transportation Needs (2022)    PRAPARE - Transportation     Lack of Transportation (Medical): No     Lack of Transportation (Non-Medical): No   Physical Activity: Inactive (2024)    Exercise Vital Sign     Days of Exercise per Week: 0 days     Minutes of Exercise per Session: 0 min   Stress: No Stress Concern Present (2024)    [employer] - Occupational Stress Questionnaire     Feeling of Stress : Only a little   Housing Stability: Low Risk  (2022)    Housing Stability Vital Sign     Unable to Pay for Housing in the Last Year: No     Number of Places Lived in the Last Year: 1     Unstable Housing in the Last Year: No     Past Surgical History:   Procedure Laterality Date     SECTION       SECTION      TONSILLECTOMY       Family History   Problem Relation Name Age of Onset    Arthritis Mother Emilie Gomez     Arthritis Father Albert Gomez     Cancer Father Albert Gomez         Melnoma cancer of the eye    Hearing loss Father Albert Gomez     Cancer Sister Ana Paula         Skin cancer       Tests to Keep You Healthy    Mammogram: Met on 2024  Colon Cancer Screening: Met on 2022  Cervical Cancer Screening: DUE  Last Blood Pressure <= 139/89 (2024): Yes      Review of patient's allergies indicates:  No Known Allergies    Current Outpatient Medications:     cetirizine (ZYRTEC) 10 MG tablet, Take 10 mg by mouth once daily., Disp: , Rfl:     meloxicam (MOBIC) 15 MG tablet, TAKE 1 TABLET BY MOUTH EVERY DAY, Disp: 30 tablet, Rfl: 1    soy isofla/blk cohosh/mag bark (ESTROVEN ORAL), Take by mouth once daily., Disp: , Rfl:     triamcinolone acetonide 0.1% (KENALOG) 0.1 % cream, Apply topically 2 (two) times  "daily., Disp: 15 g, Rfl: 0    UNABLE TO FIND, medication name: Danilo Women's Multi Gummy, Disp: , Rfl:     UNABLE TO FIND, Take 1 mL by mouth once daily. medication name: Milk Thistle Extract 1000 mg, dissolve in water., Disp: , Rfl:     EScitalopram oxalate (LEXAPRO) 20 MG tablet, Take 1 tablet (20 mg total) by mouth once daily., Disp: 90 tablet, Rfl: 3    lisinopriL 10 MG tablet, Take 1 tablet (10 mg total) by mouth once daily., Disp: 90 tablet, Rfl: 3    rosuvastatin (CRESTOR) 20 MG tablet, Take 1 tablet (20 mg total) by mouth once daily., Disp: 90 tablet, Rfl: 3    valACYclovir (VALTREX) 1000 MG tablet, Take 2 tablets (2,000 mg total) by mouth every 12 (twelve) hours., Disp: 20 tablet, Rfl: 0    Objective:      Vitals:    12/18/24 0819   BP: 138/88   Pulse: 71   SpO2: 100%   Weight: 89.4 kg (197 lb)   Height: 5' 1" (1.549 m)     Physical Exam    General: No acute distress. Well-developed. Well-nourished. obese  Eyes: EOMI. Sclerae anicteric.  HENT: Normocephalic. Atraumatic. Nares patent. Moist oral mucosa.  Ears: Bilateral TMs clear. Bilateral EACs clear.  Cardiovascular: Regular rate. Regular rhythm. No murmurs. No rubs. No gallops. Normal S1, S2.  Respiratory: Normal respiratory effort. Clear to auscultation bilaterally. No rales. No rhonchi. No wheezing.  Abdomen: Soft. Non-tender. Non-distended. Normoactive bowel sounds.  Musculoskeletal: No  obvious deformity.  Extremities: No lower extremity edema.  Neurological: Alert & oriented x3. No slurred speech. Normal gait.  Psychiatric: Normal mood. Normal affect. Good insight. Good judgment.  Skin: Warm. Dry. No rash.       Assessment:       Assessment & Plan    - Evaluated liver function: ALT decreased from 42 to 39 but remains elevated  - Assessed cholesterol levels: total 173, HDL 45 (slightly low),  (increased by 12 points)  - Considered fatty liver as likely cause of elevated liver enzymes  - Decided against rechecking hepatitis B due to previous " negative results and lack of risk factors  - Maintained current medication regimen given overall cholesterol levels and recent weight loss  - Evaluated menopausal status: patient not yet postmenopausal (last period ~6 months ago)    FATTY LIVER DISEASE:  - Discussed potential for fatty liver to improve with diet and exercise, especially in patients under 50.  - Ultrasound of abdomen ordered to evaluate liver.    MENOPAUSAL AND CLIMACTERIC STATES:  - Explained postmenopausal status definition (12 months without menstruation).  - Informed about new non-hormonal medication option for treating postmenopausal symptoms.    KNEE PAIN:  - Zelda to continue with current exercise regimen, including knee exercises and 10-15 minutes of daily activity.    DEPRESSION:  - Continued Lexapro (escitalopram) at current dose.    HYPERLIPIDEMIA:  - Refilled Rosuvastatin.    HYPERTENSION:  - Refilled Lisinopril.    HERPESVIRAL INFECTION:  - Refilled Valacyclovir (to be filled at [facility]).    IMMUNIZATION:  - Flu shot administered in office.    FOLLOW-UP:  - Follow up for ultrasound at patient's convenience, can be after Felice if preferred.  - Contact the office for ultrasound results.       Plan:       Hyperlipidemia, unspecified hyperlipidemia type  -     rosuvastatin (CRESTOR) 20 MG tablet; Take 1 tablet (20 mg total) by mouth once daily.  Dispense: 90 tablet; Refill: 3    Need for influenza vaccination  -     influenza (Flulaval, Fluzone, Fluarix) 45 mcg/0.5 mL IM vaccine (> or = 6 mo) 0.5 mL    Screening mammogram for breast cancer  -     Mammo Digital Screening Bilat w/ José Antonio; Future; Expected date: 01/18/2025    Hypertension, unspecified type  Comments:  continue lisinopril  Orders:  -     lisinopriL 10 MG tablet; Take 1 tablet (10 mg total) by mouth once daily.  Dispense: 90 tablet; Refill: 3    Depression, unspecified depression type  Comments:  continue lexapro. depression is controlled.   Orders:  -     EScitalopram oxalate  (LEXAPRO) 20 MG tablet; Take 1 tablet (20 mg total) by mouth once daily.  Dispense: 90 tablet; Refill: 3    RUQ pain  -     US Abdomen Limited; Future; Expected date: 12/18/2024    Elevated liver enzymes  -     US Abdomen Limited; Future; Expected date: 12/18/2024    Other orders  -     valACYclovir (VALTREX) 1000 MG tablet; Take 2 tablets (2,000 mg total) by mouth every 12 (twelve) hours.  Dispense: 20 tablet; Refill: 0      Follow up in about 6 months (around 6/18/2025) for medication management.        This note was generated with the assistance of ambient listening technology. Verbal consent was obtained by the patient and accompanying visitor(s) for the recording of patient appointment to facilitate this note. I attest to having reviewed and edited the generated note for accuracy, though some syntax or spelling errors may persist. Please contact the author of this note for any clarification.      12/30/2024 Renetta Bunch

## 2025-01-17 ENCOUNTER — PATIENT MESSAGE (OUTPATIENT)
Dept: FAMILY MEDICINE | Facility: CLINIC | Age: 50
End: 2025-01-17

## 2025-01-30 ENCOUNTER — HOSPITAL ENCOUNTER (OUTPATIENT)
Dept: RADIOLOGY | Facility: HOSPITAL | Age: 50
Discharge: HOME | End: 2025-01-30
Attending: NURSE PRACTITIONER
Payer: COMMERCIAL

## 2025-01-30 DIAGNOSIS — Z12.31 SCREENING MAMMOGRAM FOR BREAST CANCER: ICD-10-CM

## 2025-01-30 PROCEDURE — 77063 BREAST TOMOSYNTHESIS BI: CPT | Mod: TC,PO

## 2025-01-30 PROCEDURE — 77067 SCR MAMMO BI INCL CAD: CPT | Mod: 26,,, | Performed by: RADIOLOGY

## 2025-01-30 PROCEDURE — 77063 BREAST TOMOSYNTHESIS BI: CPT | Mod: 26,,, | Performed by: RADIOLOGY

## 2025-02-19 ENCOUNTER — TELEPHONE (OUTPATIENT)
Dept: ORTHOPEDICS | Facility: CLINIC | Age: 50
End: 2025-02-19
Payer: COMMERCIAL

## 2025-02-19 ENCOUNTER — HOSPITAL ENCOUNTER (EMERGENCY)
Facility: HOSPITAL | Age: 50
Discharge: HOME | End: 2025-02-19
Attending: EMERGENCY MEDICINE
Payer: COMMERCIAL

## 2025-02-19 VITALS
TEMPERATURE: 98 F | WEIGHT: 188 LBS | RESPIRATION RATE: 18 BRPM | DIASTOLIC BLOOD PRESSURE: 72 MMHG | HEART RATE: 88 BPM | SYSTOLIC BLOOD PRESSURE: 107 MMHG | BODY MASS INDEX: 35.5 KG/M2 | OXYGEN SATURATION: 97 % | HEIGHT: 61 IN

## 2025-02-19 DIAGNOSIS — T07.XXXA ABRASIONS OF MULTIPLE SITES: ICD-10-CM

## 2025-02-19 DIAGNOSIS — S42.292A OTHER CLOSED DISPLACED FRACTURE OF PROXIMAL END OF LEFT HUMERUS, INITIAL ENCOUNTER: Primary | ICD-10-CM

## 2025-02-19 DIAGNOSIS — S01.511A LIP LACERATION, INITIAL ENCOUNTER: ICD-10-CM

## 2025-02-19 DIAGNOSIS — M25.512 LEFT SHOULDER PAIN: ICD-10-CM

## 2025-02-19 PROCEDURE — 25000003 PHARM REV CODE 250: Performed by: EMERGENCY MEDICINE

## 2025-02-19 PROCEDURE — 90715 TDAP VACCINE 7 YRS/> IM: CPT | Performed by: EMERGENCY MEDICINE

## 2025-02-19 PROCEDURE — 63600175 PHARM REV CODE 636 W HCPCS: Performed by: EMERGENCY MEDICINE

## 2025-02-19 PROCEDURE — 99285 EMERGENCY DEPT VISIT HI MDM: CPT | Mod: 25

## 2025-02-19 PROCEDURE — 12011 RPR F/E/E/N/L/M 2.5 CM/<: CPT

## 2025-02-19 PROCEDURE — 90471 IMMUNIZATION ADMIN: CPT | Performed by: EMERGENCY MEDICINE

## 2025-02-19 RX ORDER — HYDROCODONE BITARTRATE AND ACETAMINOPHEN 10; 325 MG/1; MG/1
1 TABLET ORAL
Refills: 0 | Status: COMPLETED | OUTPATIENT
Start: 2025-02-19 | End: 2025-02-19

## 2025-02-19 RX ORDER — LIDOCAINE HYDROCHLORIDE 10 MG/ML
10 INJECTION, SOLUTION INFILTRATION; PERINEURAL
Status: COMPLETED | OUTPATIENT
Start: 2025-02-19 | End: 2025-02-19

## 2025-02-19 RX ORDER — HYDROCODONE BITARTRATE AND ACETAMINOPHEN 10; 325 MG/1; MG/1
1 TABLET ORAL EVERY 6 HOURS PRN
Qty: 15 TABLET | Refills: 0 | Status: SHIPPED | OUTPATIENT
Start: 2025-02-19

## 2025-02-19 RX ADMIN — LIDOCAINE HYDROCHLORIDE 10 ML: 10 INJECTION, SOLUTION INFILTRATION; PERINEURAL at 08:02

## 2025-02-19 RX ADMIN — CLOSTRIDIUM TETANI TOXOID ANTIGEN (FORMALDEHYDE INACTIVATED), CORYNEBACTERIUM DIPHTHERIAE TOXOID ANTIGEN (FORMALDEHYDE INACTIVATED), BORDETELLA PERTUSSIS TOXOID ANTIGEN (GLUTARALDEHYDE INACTIVATED), BORDETELLA PERTUSSIS FILAMENTOUS HEMAGGLUTININ ANTIGEN (FORMALDEHYDE INACTIVATED), BORDETELLA PERTUSSIS PERTACTIN ANTIGEN, AND BORDETELLA PERTUSSIS FIMBRIAE 2/3 ANTIGEN 0.5 ML: 5; 2; 2.5; 5; 3; 5 INJECTION, SUSPENSION INTRAMUSCULAR at 08:02

## 2025-02-19 RX ADMIN — HYDROCODONE BITARTRATE AND ACETAMINOPHEN 1 TABLET: 10; 325 TABLET ORAL at 08:02

## 2025-02-19 NOTE — Clinical Note
"Zelda Pugh" Kristina was seen and treated in our emergency department on 2/19/2025.  She may return to work on 02/24/2025.  Unable to use left arm at all     If you have any questions or concerns, please don't hesitate to call.      Chang Hernandez MD"

## 2025-02-19 NOTE — ED PROVIDER NOTES
Encounter Date: 2025       History     Chief Complaint   Patient presents with    Shoulder Injury     Pt states she was walking her dogs and they started to take off after another animal and she slipped and fell. Pt has small abrasions to face and right hand and left arm. Pt is complaining of left shoulder pain (came in sling). Pt states she is unable to do a lot of movement with that arm very painful. NO LOC. No Blood thinners     49-year-old female presents to the emergency room with shoulder pain.  She was walking her dogs this morning when they abruptly ran after another dog and pulled her to the ground.  She has abrasions on both hands.  She has a small abrasion to the left lip.  Primarily complaining of shoulder pain.  Also left knee bruising but she is able to stand and bear weight without difficulty.  Thinks she may have struck her head but no loss of consciousness, no neck pain.  Thinks 1 of her teeth in the upper jaw is loose.    The history is provided by the patient.     Review of patient's allergies indicates:  No Known Allergies  Past Medical History:   Diagnosis Date    Anxiety disorder, unspecified     Hypertension     Mixed hyperlipidemia      Past Surgical History:   Procedure Laterality Date     SECTION       SECTION      TONSILLECTOMY       Family History   Problem Relation Name Age of Onset    Arthritis Mother Emilie Gomez     Arthritis Father Albert Gomez     Cancer Father Albert Gomez         Melnoma cancer of the eye    Hearing loss Father Albert Gomez     Cancer Sister Ana Paula         Skin cancer     Social History[1]  Review of Systems   Constitutional:  Negative for diaphoresis.   HENT:  Positive for facial swelling.    Eyes:  Negative for pain.   Respiratory:  Negative for shortness of breath.    Cardiovascular:  Negative for chest pain.   Gastrointestinal:  Negative for abdominal pain.   Genitourinary:  Negative for flank pain.   Musculoskeletal:  Positive for  arthralgias. Negative for neck pain.   Skin:  Positive for wound.   Neurological:  Negative for headaches.   Hematological:  Does not bruise/bleed easily.   Psychiatric/Behavioral:  Negative for confusion.    All other systems reviewed and are negative.      Physical Exam     Initial Vitals [02/19/25 0811]   BP Pulse Resp Temp SpO2   109/62 82 15 98.4 °F (36.9 °C) 96 %      MAP       --         Physical Exam    Nursing note and vitals reviewed.  Constitutional: She appears well-developed and well-nourished. She is not diaphoretic. No distress.   HENT:   Head: Normocephalic and atraumatic.   Nose: No nasal deformity, septal deviation or nasal septal hematoma.     Mouth/Throat:       Left lower lip abrasion, small laceration   Eyes: EOM are normal.   Neck: Neck supple.   Normal range of motion.  Cardiovascular:  Normal rate, regular rhythm and normal heart sounds.     Exam reveals no gallop and no friction rub.       No murmur heard.  Pulmonary/Chest: Breath sounds normal. No respiratory distress. She has no wheezes. She has no rhonchi. She has no rales.   Musculoskeletal:      Left shoulder: Tenderness and bony tenderness present. Decreased range of motion.      Left upper arm: Normal.      Left elbow: Normal.      Cervical back: Normal range of motion and neck supple.      Comments: Tender over the left coracoid process.  Unable to flex the biceps without significant pain.  No elbow tenderness.  No biceps defect or tenderness.  No AC joint tenderness.  Only shoulder tenderness is over the coracoid.  Does not feel dislocated.    Abrasions to bilateral palms     Neurological: She is alert and oriented to person, place, and time.   Neurovascularly intact in the left arm.  Intact wrist extension and flexion on the left.  Normal  strength.  Normal sensation to light touch in the left arm and hand.   Skin: Skin is warm and dry.   Psychiatric: She has a normal mood and affect. Her behavior is normal. Judgment and  thought content normal.         ED Course   Lac Repair    Date/Time: 2/19/2025 8:01 AM    Performed by: Chang Hernandez MD  Authorized by: Chang Hernandez MD    Consent:     Consent obtained:  Verbal    Consent given by:  Patient    Risks discussed:  Pain, retained foreign body, infection, poor cosmetic result and poor wound healing  Universal protocol:     Procedure explained and questions answered to patient or proxy's satisfaction: yes      Relevant documents present and verified: yes      Test results available: yes      Patient identity confirmed:  Verbally with patient  Anesthesia:     Anesthesia method:  Nerve block    Block needle gauge:  27 G    Block anesthetic:  Lidocaine 1% w/o epi    Block technique:  Mental    Block injection procedure:  Introduced needle, incremental injection, anatomic landmarks identified, anatomic landmarks palpated and negative aspiration for blood    Block outcome:  Anesthesia achieved  Laceration details:     Location:  Lip    Lip location:  Lower interior lip    Length (cm):  0.5  Pre-procedure details:     Preparation:  Patient was prepped and draped in usual sterile fashion  Exploration:     Limited defect created (wound extended): no      Hemostasis achieved with:  Direct pressure    Imaging outcome: foreign body not noted      Wound exploration: wound explored through full range of motion      Wound extent: areolar tissue violated    Treatment:     Amount of cleaning:  Standard    Irrigation solution:  Sterile saline    Irrigation volume:  Wet gauze    Visualized foreign bodies/material removed: no      Debridement:  None    Undermining:  None    Scar revision: no    Skin repair:     Repair method:  Sutures    Suture size:  5-0    Suture material:  Fast-absorbing gut    Suture technique:  Simple interrupted    Number of sutures:  1  Approximation:     Approximation:  Close    [place] border well-aligned: n/a.    Repair type:     Repair type:  Simple  Post-procedure  details:     Dressing:  Open (no dressing)    Procedure completion:  Tolerated    Labs Reviewed - No data to display       Imaging Results              CT Shoulder Without Contrast Left (Final result)  Result time 02/19/25 10:50:22      Final result by Mert Dorantes MD (02/19/25 10:50:22)                   Impression:      Comminuted, mildly displaced left proximal humerus fracture.      Electronically signed by: Mert Dorantes MD  Date:    02/19/2025  Time:    10:50               Narrative:    EXAMINATION:  CT SHOULDER WITHOUT CONTRAST LEFT    CLINICAL HISTORY:  Fracture, shoulder;    TECHNIQUE:  Axial images through the left shoulder were acquired without contrast with multiplanar reformatted images created    COMPARISON:  None    FINDINGS:  There is a mildly impacted, mildly displaced fracture of the humeral neck, accompanied by a comminuted, mildly displaced fracture through the greater tuberosity.  A nondisplaced fracture also extends through the lesser tuberosity.  The humeral head is located.  There is mild glenohumeral and moderate AC joint degenerative change.                                       X-Ray Shoulder Trauma Left (Final result)  Result time 02/19/25 09:31:24      Final result by Ethan Chino Jr., MD (02/19/25 09:31:24)                   Impression:      Comminuted fracture of the left humeral head and neck.  Hypertrophic changes with small inferior spur at the AC joint.      Electronically signed by: Ethan Chino MD  Date:    02/19/2025  Time:    09:31               Narrative:    EXAMINATION:  XR SHOULDER TRAUMA 3 VIEW LEFT    CLINICAL HISTORY:  Pain in left shoulder    TECHNIQUE:  2 or more views of the shoulder are obtained.    COMPARISON:  None.    FINDINGS:  There is comminuted fracture of the left humeral head and neck.  The main fragment remains in the glenoid.  The scapula and clavicle are intact.  The AC joint shows hypertrophic changes in small inferior spur.                                        Medications   LIDOcaine HCL 10 mg/ml (1%) injection 10 mL (10 mLs Infiltration Given by Other 2/19/25 0845)   Tdap vaccine injection 0.5 mL (0.5 mLs Intramuscular Given 2/19/25 0845)   HYDROcodone-acetaminophen  mg per tablet 1 tablet (1 tablet Oral Given 2/19/25 0844)     Medical Decision Making  49-year-old female presents with shoulder pain after she was pulled down by her dogs.  X-ray shows a humeral fracture.  Case discussed with orthopedics who will see the patient in clinic.  Placed into a sling.  Small lip laceration was repaired with 1 single absorbable suture.  Left upper central incisor is a little loose.  Follow up with a dentist.  Tetanus updated.  See no reason for antibiotics.  Pain well-controlled in the emergency room.    Amount and/or Complexity of Data Reviewed  Radiology: ordered. Decision-making details documented in ED Course.    Risk  Prescription drug management.               ED Course as of 02/19/25 1558   Wed Feb 19, 2025   0849 BP: 109/62 [EF]   0849 Temp: 98.4 °F (36.9 °C) [EF]   0849 Pulse: 82 [EF]   0849 Resp: 15 [EF]   0849 SpO2: 96 % [EF]   0946 X-Ray Shoulder Trauma Left [EF]   1004 Case d/w dr elkins, he will see in clinic, likely needs surgery. Will get CT to help op planning [EF]      ED Course User Index  [EF] Chang Hernandez MD                           Clinical Impression:  Final diagnoses:  [M25.512] Left shoulder pain  [S42.292A] Other closed displaced fracture of proximal end of left humerus, initial encounter (Primary)  [T07.XXXA] Abrasions of multiple sites  [S01.511A] Lip laceration, initial encounter          ED Disposition Condition    Discharge Stable          ED Prescriptions       Medication Sig Dispense Start Date End Date Auth. Provider    HYDROcodone-acetaminophen (NORCO)  mg per tablet Take 1 tablet by mouth every 6 (six) hours as needed for Pain. 15 tablet 2/19/2025 -- Chang Hernandez MD          Follow-up  Information       Follow up With Specialties Details Why Contact Info Additional Information    Kenji Maradiaga MD Sports Medicine, Orthopedic Surgery Schedule an appointment as soon as possible for a visit   [address] [place] [address] DR Baez [facility]  [place] [address]  [phone]       [facility] Emergency Medicine  As needed, If symptoms worsen [address] [place] [address] [facility] [id number] 1st floor                 [1]   Social History  Tobacco Use    Smoking status: Never     Passive exposure: Never    Smokeless tobacco: Never   Substance Use Topics    Alcohol use: Yes     Alcohol/week: 1.0 standard drink of alcohol     Types: 1 Unspecified drink type per week     Comment: This may only be one to two times a month    Drug use: Never        Chang Hernandez MD  02/19/25 [id number]

## 2025-02-19 NOTE — ED NOTES
Pt has sling she came from home with , would like to use same sling. oK with ERP, pt will leave with sling from home.

## 2025-02-19 NOTE — TELEPHONE ENCOUNTER
----- Message from Med Assistant Freedman sent at 2/19/2025 11:20 AM CST -----  Regarding: ER f/u  Contact: patient  Patient called in and is in the ER and was diagnosed with: Left shoulder pain [M25.512]Other closed displaced fracture of proximal end of left humerus, initial encount... Patient would like to be seen sooner than later.Call back number is [phone]

## 2025-02-20 ENCOUNTER — OFFICE VISIT (OUTPATIENT)
Dept: ORTHOPEDICS | Facility: CLINIC | Age: 50
End: 2025-02-20
Payer: COMMERCIAL

## 2025-02-20 VITALS — RESPIRATION RATE: 17 BRPM

## 2025-02-20 DIAGNOSIS — M25.512 LEFT SHOULDER PAIN: ICD-10-CM

## 2025-02-20 DIAGNOSIS — S42.292A OTHER CLOSED DISPLACED FRACTURE OF PROXIMAL END OF LEFT HUMERUS, INITIAL ENCOUNTER: ICD-10-CM

## 2025-02-20 DIAGNOSIS — S42.292A OTHER CLOSED DISPLACED FRACTURE OF PROXIMAL END OF LEFT HUMERUS, INITIAL ENCOUNTER: Primary | ICD-10-CM

## 2025-02-20 RX ORDER — OXYCODONE AND ACETAMINOPHEN 5; 325 MG/1; MG/1
1 TABLET ORAL EVERY 6 HOURS PRN
Qty: 28 TABLET | Refills: 0 | Status: SHIPPED | OUTPATIENT
Start: 2025-02-20

## 2025-02-20 NOTE — PROGRESS NOTES
Past Medical History:   Diagnosis Date    Anxiety disorder, unspecified     Hypertension     Mixed hyperlipidemia        Past Surgical History:   Procedure Laterality Date     SECTION       SECTION      TONSILLECTOMY         Current Outpatient Medications   Medication Sig    cetirizine (ZYRTEC) 10 MG tablet Take 10 mg by mouth once daily.    EScitalopram oxalate (LEXAPRO) 20 MG tablet Take 1 tablet (20 mg total) by mouth once daily.    HYDROcodone-acetaminophen (NORCO)  mg per tablet Take 1 tablet by mouth every 6 (six) hours as needed for Pain.    lisinopriL 10 MG tablet Take 1 tablet (10 mg total) by mouth once daily.    meloxicam (MOBIC) 15 MG tablet TAKE 1 TABLET BY MOUTH EVERY DAY    rosuvastatin (CRESTOR) 20 MG tablet Take 1 tablet (20 mg total) by mouth once daily.    soy isofla/blk cohosh/mag bark (ESTROVEN ORAL) Take by mouth once daily.    triamcinolone acetonide 0.1% (KENALOG) 0.1 % cream Apply topically 2 (two) times daily.    UNABLE TO FIND medication name: Danilo Women's Multi Gummy    UNABLE TO FIND Take 1 mL by mouth once daily. medication name: Milk Thistle Extract 1000 mg, dissolve in water.    valACYclovir (VALTREX) 1000 MG tablet Take 2 tablets (2,000 mg total) by mouth every 12 (twelve) hours.     No current facility-administered medications for this visit.       Review of patient's allergies indicates:  No Known Allergies    Family History   Problem Relation Name Age of Onset    Arthritis Mother Emilie Gomez     Arthritis Father Albert Gomez     Cancer Father Albert Gomez         Melnoma cancer of the eye    Hearing loss Father Albert Gomez     Cancer Sister Ana Paula         Skin cancer       Social History[1]    Chief Complaint: No chief complaint on file.      History of present illness:  49-year-old right-hand dominant female seen for injury to the left shoulder.  Patient works a sedentary job using a computer.  Patient was walking her dogs on  when they  chased another dog pulling her to the ground.  Patient injured her left shoulder.  X-rays of the ER as well as a CT scan showed a 3 part proximal humerus fracture.  Pain is still rated as a 10/10.  She is on Norco 10 but they are not lasting are working very well    Prior treatment:  Sling        Review of Systems:    Constitution: Negative for chills, fever, and sweats.  Negative for unexplained weight loss.    HENT:  Negative for headaches and blurry vision.    Cardiovascular:Negative for chest pain or irregular heart beat. Negative for hypertension.    Respiratory:  Negative for cough and shortness of breath.    Gastrointestinal: Negative for abdominal pain, heartburn, melena, nausea, and vomitting.    Genitourinary:  Negative bladder incontinence and dysuria.    Musculoskeletal:  See HPI    Neurological: Negative for numbness.    Psychiatric/Behavioral: Negative for depression.  The patient is not nervous/anxious.      Endocrine: Negative for polyuria    Hematologic/Lymphatic: Negative for bleeding problem.  Does not bruise/bleed easily.    Skin: Negative for poor would healing and rash      Physical Examination:    Vital Signs:  There were no vitals filed for this visit.    There is no height or weight on file to calculate BMI.    This a well-developed, well nourished patient in no acute distress.  They are alert and oriented and cooperative to examination.  Pt. walks without an antalgic gait.      Examination left shoulder shows mild bruising forming in the biceps area.  Significant guarding due to pain.  Neurovascularly intact.    Heart is regular rate without obvious murmurs   Normal respiratory effort without audible wheezing  Abdomen is soft and nontender     X-rays:  X-rays left shoulder ordered and review which show a minimally displaced greater tuberosity fracture with surgical neck fracture as well.    CT scan of the left shoulder is reviewed and interpreted:Comminuted, mildly displaced left 3 part  proximal humerus fracture.            Assessment:  Left 3 part proximal humerus fracture    Plan:  I reviewed the findings with her today.  We discussed non operative versus operative treatment as well as the pros and cons of each.  Patient would like to go ahead and have this surgically addressed.  Plan will be for open reduction internal fixation of 3 part proximal humerus fracture.  I did increase her pain medicine to Percocet 5s.  Risks, benefits, and alternatives to the procedure were explained to the patient including but not limited to damage to nerves, arteries, blood vessels, bones, tendons, ligaments, stiffness, instability, infection, permanent limb dysfunction, DVT, PE, as well as general anesthetic complications including seizure, stroke, heart attack and even death. The patient understood these risks and wished to proceed and signed the informed consent.               All previous pertinent notes including ER visits, physical therapy visits, other orthopedic visits as well as other care for the same musculoskeletal problem were reviewed.  All pertinent lab values and previous imaging was reviewed pertinent to the current visit.    This note was created using [employer] voice recognition software that occasionally misinterpreted phrases or words.    Consult note is delivered via Epic messaging service.           [1]   Social History  Socioeconomic History    Marital status:    Tobacco Use    Smoking status: Never     Passive exposure: Never    Smokeless tobacco: Never   Substance and Sexual Activity    Alcohol use: Yes     Alcohol/week: 1.0 standard drink of alcohol     Types: 1 Unspecified drink type per week     Comment: This may only be one to two times a month    Drug use: Never    Sexual activity: Not Currently     Partners: Male     Birth control/protection: Abstinence     Social Drivers of Health     Financial Resource Strain: Low Risk  (6/18/2024)    Overall Financial Resource Strain  (CARDIA)     Difficulty of Paying Living Expenses: Not hard at all   Food Insecurity: No Food Insecurity (6/18/2024)    Hunger Vital Sign     Worried About Running Out of Food in the Last Year: Never true     Ran Out of Food in the Last Year: Never true   Transportation Needs: No Transportation Needs (12/22/2022)    PRAPARE - Transportation     Lack of Transportation (Medical): No     Lack of Transportation (Non-Medical): No   Physical Activity: Inactive (6/18/2024)    Exercise Vital Sign     Days of Exercise per Week: 0 days     Minutes of Exercise per Session: 0 min   Stress: No Stress Concern Present (6/18/2024)    [employer] [place] [employer] - Occupational Stress Questionnaire     Feeling of Stress : Only a little   Housing Stability: Low Risk  (12/22/2022)    Housing Stability Vital Sign     Unable to Pay for Housing in the Last Year: No     Number of Places Lived in the Last Year: 1     Unstable Housing in the Last Year: No

## 2025-02-20 NOTE — H&P (VIEW-ONLY)
Past Medical History:   Diagnosis Date    Anxiety disorder, unspecified     Hypertension     Mixed hyperlipidemia        Past Surgical History:   Procedure Laterality Date     SECTION       SECTION      TONSILLECTOMY         Current Outpatient Medications   Medication Sig    cetirizine (ZYRTEC) 10 MG tablet Take 10 mg by mouth once daily.    EScitalopram oxalate (LEXAPRO) 20 MG tablet Take 1 tablet (20 mg total) by mouth once daily.    HYDROcodone-acetaminophen (NORCO)  mg per tablet Take 1 tablet by mouth every 6 (six) hours as needed for Pain.    lisinopriL 10 MG tablet Take 1 tablet (10 mg total) by mouth once daily.    meloxicam (MOBIC) 15 MG tablet TAKE 1 TABLET BY MOUTH EVERY DAY    rosuvastatin (CRESTOR) 20 MG tablet Take 1 tablet (20 mg total) by mouth once daily.    soy isofla/blk cohosh/mag bark (ESTROVEN ORAL) Take by mouth once daily.    triamcinolone acetonide 0.1% (KENALOG) 0.1 % cream Apply topically 2 (two) times daily.    UNABLE TO FIND medication name: Danilo Women's Multi Gummy    UNABLE TO FIND Take 1 mL by mouth once daily. medication name: Milk Thistle Extract 1000 mg, dissolve in water.    valACYclovir (VALTREX) 1000 MG tablet Take 2 tablets (2,000 mg total) by mouth every 12 (twelve) hours.     No current facility-administered medications for this visit.       Review of patient's allergies indicates:  No Known Allergies    Family History   Problem Relation Name Age of Onset    Arthritis Mother Emilie Gomez     Arthritis Father Albert Gomez     Cancer Father Albert Gomez         Melnoma cancer of the eye    Hearing loss Father Albert Gomez     Cancer Sister Ana Paula         Skin cancer       Social History[1]    Chief Complaint: No chief complaint on file.      History of present illness:  49-year-old right-hand dominant female seen for injury to the left shoulder.  Patient works a sedentary job using a computer.  Patient was walking her dogs on  when they  chased another dog pulling her to the ground.  Patient injured her left shoulder.  X-rays of the ER as well as a CT scan showed a 3 part proximal humerus fracture.  Pain is still rated as a 10/10.  She is on Norco 10 but they are not lasting are working very well    Prior treatment:  Sling        Review of Systems:    Constitution: Negative for chills, fever, and sweats.  Negative for unexplained weight loss.    HENT:  Negative for headaches and blurry vision.    Cardiovascular:Negative for chest pain or irregular heart beat. Negative for hypertension.    Respiratory:  Negative for cough and shortness of breath.    Gastrointestinal: Negative for abdominal pain, heartburn, melena, nausea, and vomitting.    Genitourinary:  Negative bladder incontinence and dysuria.    Musculoskeletal:  See HPI    Neurological: Negative for numbness.    Psychiatric/Behavioral: Negative for depression.  The patient is not nervous/anxious.      Endocrine: Negative for polyuria    Hematologic/Lymphatic: Negative for bleeding problem.  Does not bruise/bleed easily.    Skin: Negative for poor would healing and rash      Physical Examination:    Vital Signs:  There were no vitals filed for this visit.    There is no height or weight on file to calculate BMI.    This a well-developed, well nourished patient in no acute distress.  They are alert and oriented and cooperative to examination.  Pt. walks without an antalgic gait.      Examination left shoulder shows mild bruising forming in the biceps area.  Significant guarding due to pain.  Neurovascularly intact.    Heart is regular rate without obvious murmurs   Normal respiratory effort without audible wheezing  Abdomen is soft and nontender     X-rays:  X-rays left shoulder ordered and review which show a minimally displaced greater tuberosity fracture with surgical neck fracture as well.    CT scan of the left shoulder is reviewed and interpreted:Comminuted, mildly displaced left 3 part  proximal humerus fracture.            Assessment:  Left 3 part proximal humerus fracture    Plan:  I reviewed the findings with her today.  We discussed non operative versus operative treatment as well as the pros and cons of each.  Patient would like to go ahead and have this surgically addressed.  Plan will be for open reduction internal fixation of 3 part proximal humerus fracture.  I did increase her pain medicine to Percocet 5s.  Risks, benefits, and alternatives to the procedure were explained to the patient including but not limited to damage to nerves, arteries, blood vessels, bones, tendons, ligaments, stiffness, instability, infection, permanent limb dysfunction, DVT, PE, as well as general anesthetic complications including seizure, stroke, heart attack and even death. The patient understood these risks and wished to proceed and signed the informed consent.               All previous pertinent notes including ER visits, physical therapy visits, other orthopedic visits as well as other care for the same musculoskeletal problem were reviewed.  All pertinent lab values and previous imaging was reviewed pertinent to the current visit.    This note was created using [employer] voice recognition software that occasionally misinterpreted phrases or words.    Consult note is delivered via Epic messaging service.           [1]   Social History  Socioeconomic History    Marital status:    Tobacco Use    Smoking status: Never     Passive exposure: Never    Smokeless tobacco: Never   Substance and Sexual Activity    Alcohol use: Yes     Alcohol/week: 1.0 standard drink of alcohol     Types: 1 Unspecified drink type per week     Comment: This may only be one to two times a month    Drug use: Never    Sexual activity: Not Currently     Partners: Male     Birth control/protection: Abstinence     Social Drivers of Health     Financial Resource Strain: Low Risk  (6/18/2024)    Overall Financial Resource Strain  (CARDIA)     Difficulty of Paying Living Expenses: Not hard at all   Food Insecurity: No Food Insecurity (6/18/2024)    Hunger Vital Sign     Worried About Running Out of Food in the Last Year: Never true     Ran Out of Food in the Last Year: Never true   Transportation Needs: No Transportation Needs (12/22/2022)    PRAPARE - Transportation     Lack of Transportation (Medical): No     Lack of Transportation (Non-Medical): No   Physical Activity: Inactive (6/18/2024)    Exercise Vital Sign     Days of Exercise per Week: 0 days     Minutes of Exercise per Session: 0 min   Stress: No Stress Concern Present (6/18/2024)    [employer] [place] [employer] - Occupational Stress Questionnaire     Feeling of Stress : Only a little   Housing Stability: Low Risk  (12/22/2022)    Housing Stability Vital Sign     Unable to Pay for Housing in the Last Year: No     Number of Places Lived in the Last Year: 1     Unstable Housing in the Last Year: No

## 2025-02-21 DIAGNOSIS — S42.292A CLOSED FRACTURE OF HEAD OF LEFT HUMERUS: ICD-10-CM

## 2025-02-21 DIAGNOSIS — Z01.818 PRE-OP TESTING: ICD-10-CM

## 2025-02-21 DIAGNOSIS — S42.292A OTHER CLOSED DISPLACED FRACTURE OF PROXIMAL END OF LEFT HUMERUS, INITIAL ENCOUNTER: Primary | ICD-10-CM

## 2025-02-21 RX ORDER — MUPIROCIN 20 MG/G
OINTMENT TOPICAL
OUTPATIENT
Start: 2025-02-21

## 2025-02-24 ENCOUNTER — HOSPITAL ENCOUNTER (OUTPATIENT)
Dept: PREADMISSION TESTING | Facility: HOSPITAL | Age: 50
Discharge: HOME | End: 2025-02-24
Attending: ORTHOPAEDIC SURGERY
Payer: COMMERCIAL

## 2025-02-24 DIAGNOSIS — Z01.818 PREOP TESTING: Primary | ICD-10-CM

## 2025-02-24 DIAGNOSIS — I10 HYPERTENSION: ICD-10-CM

## 2025-02-24 LAB
ANION GAP SERPL CALC-SCNC: 9 MMOL/L (ref 8–16)
BASOPHILS # BLD AUTO: 0.09 K/UL (ref 0–0.2)
BASOPHILS NFR BLD: 1.3 % (ref 0–1.9)
BUN SERPL-MCNC: 13 MG/DL (ref 6–20)
CALCIUM SERPL-MCNC: 9.5 MG/DL (ref 8.7–10.5)
CHLORIDE SERPL-SCNC: 105 MMOL/L (ref 95–110)
CO2 SERPL-SCNC: 27 MMOL/L (ref 23–29)
CREAT SERPL-MCNC: 0.7 MG/DL (ref 0.5–1.4)
DIFFERENTIAL METHOD BLD: ABNORMAL
EOSINOPHIL # BLD AUTO: 0.5 K/UL (ref 0–0.5)
EOSINOPHIL NFR BLD: 7.8 % (ref 0–8)
ERYTHROCYTE [DISTWIDTH] IN BLOOD BY AUTOMATED COUNT: 12 % (ref 11.5–14.5)
EST. GFR  (NO RACE VARIABLE): >60 ML/MIN/1.73 M^2
GLUCOSE SERPL-MCNC: 110 MG/DL (ref 70–110)
HCT VFR BLD AUTO: 37.1 % (ref 37–48.5)
HGB BLD-MCNC: 11.9 G/DL (ref 12–16)
IMM GRANULOCYTES # BLD AUTO: 0.03 K/UL (ref 0–0.04)
IMM GRANULOCYTES NFR BLD AUTO: 0.4 % (ref 0–0.5)
LYMPHOCYTES # BLD AUTO: 1.9 K/UL (ref 1–4.8)
LYMPHOCYTES NFR BLD: 27.4 % (ref 18–48)
MCH RBC QN AUTO: 27.9 PG (ref 27–31)
MCHC RBC AUTO-ENTMCNC: 32.1 G/DL (ref 32–36)
MCV RBC AUTO: 87 FL (ref 82–98)
MONOCYTES # BLD AUTO: 0.5 K/UL (ref 0.3–1)
MONOCYTES NFR BLD: 7.4 % (ref 4–15)
NEUTROPHILS # BLD AUTO: 3.8 K/UL (ref 1.8–7.7)
NEUTROPHILS NFR BLD: 55.7 % (ref 38–73)
NRBC BLD-RTO: 0 /100 WBC
OHS QRS DURATION: 84 MS
OHS QTC CALCULATION: 442 MS
PLATELET # BLD AUTO: 273 K/UL (ref 150–450)
PMV BLD AUTO: 8.8 FL (ref 9.2–12.9)
POTASSIUM SERPL-SCNC: 3.6 MMOL/L (ref 3.5–5.1)
RBC # BLD AUTO: 4.27 M/UL (ref 4–5.4)
SODIUM SERPL-SCNC: 141 MMOL/L (ref 136–145)
WBC # BLD AUTO: 6.9 K/UL (ref 3.9–12.7)

## 2025-02-24 PROCEDURE — 85025 COMPLETE CBC W/AUTO DIFF WBC: CPT | Performed by: ORTHOPAEDIC SURGERY

## 2025-02-24 PROCEDURE — 80048 BASIC METABOLIC PNL TOTAL CA: CPT | Performed by: ANESTHESIOLOGY

## 2025-02-24 PROCEDURE — 36415 COLL VENOUS BLD VENIPUNCTURE: CPT | Performed by: ANESTHESIOLOGY

## 2025-02-24 PROCEDURE — 93010 ELECTROCARDIOGRAM REPORT: CPT | Mod: ,,, | Performed by: GENERAL PRACTICE

## 2025-02-24 PROCEDURE — 93005 ELECTROCARDIOGRAM TRACING: CPT

## 2025-02-24 RX ORDER — DEXTROMETHORPHAN POLISTIREX 30 MG/5 ML
SUSPENSION, EXTENDED RELEASE 12 HR ORAL ONCE
COMMUNITY

## 2025-02-24 NOTE — DISCHARGE INSTRUCTIONS
To confirm, Your doctor has instructed you that surgery is scheduled for: 2/28/25 with DR. MCFARLANE    Please report to [facility], Registration the morning of surgery. You must check-in and receive a wristband before going to your procedure.  [address] [place] [address] [place] [address]    Pre-Op will call the afternoon prior to surgery between 1:00 and 6:00 PM with the final arrival time.  Phone number: [phone]    PLEASE NOTE:  The surgery schedule has many variables which may affect the time of your surgery case.  Family members should be available if your surgery time changes.  Plan to be here the day of your procedure between 4-6 hours.    MEDICATIONS:  TAKE ONLY THESE MEDICATIONS WITH A SMALL SIP OF WATER THE MORNING OF YOUR PROCEDURE:  SEE MED LIST      DO NOT TAKE THESE MEDICATIONS 5-7 DAYS PRIOR to your procedure or per your surgeon's request:   ASPIRIN, ALEVE, ADVIL, IBUPROFEN, FISH OIL VITAMIN E, HERBALS  (May take Tylenol)    ONLY if you are prescribed any types of blood thinners such as:  Aspirin, Coumadin, Plavix, Pradaxa, Xarelto, Aggrenox, Effient, Eliquis, Savasya, Brilinta, or any other, ask your surgeon whether you should stop taking them and how long before surgery you should stop.  You may also need to verify with the prescribing physician if it is ok to stop your medication.      INSTRUCTIONS IMPORTANT!!  Do not eat or drink anything between midnight and the time of your procedure- this includes gum, mints, and candy.  EXCEPT: you may have clear liquids such as:  WATER, BLACK COFFEE, UNSWEET TEA, OR GATORADE (NO RED OR PURPLE) UP TO 2 HOURS PRIOR TO YOUR ARRIVAL TIME.  Do not smoke or drink alcoholic beverages 24 hours prior to your procedure.  Shower the night before AND the morning of your procedure with a Chlorhexidine wash such as Hibiclens or Dial antibacterial soap from the neck down.  Do not get it on your face or in your eyes.  You may use your own shampoo and face  wash. This helps your skin to be as bacteria free as possible.    If you wear contact lenses, dentures, hearing aids or glasses, bring a container to put them in during surgery and give to a family member for safe keeping.  Please leave all jewelry, piercing's and valuables at home. You must remove your false eyelashes prior to surgery.    DO NOT remove hair from the surgery site.  Do not shave the incision site unless you are given specific instructions to do so.    ONLY if you have been diagnosed with sleep apnea please bring your C-PAP machine.  ONLY if you wear home oxygen please bring your portable oxygen tank the day of your procedure.  ONLY if you have a history of OPEN HEART SURGERY you will need a clearance from your Cardiologist per Anesthesia.      ONLY for patients requiring bowel prep, written instructions will be given by your doctor's office.  ONLY if you have a neuro stimulator, please bring the controller with you the morning of surgery  ONLY if a type and screen test is needed before surgery, please return:  If your doctor has scheduled you for an overnight stay, bring a small overnight bag with any personal items you need.  Make arrangements in advance for transportation home by a responsible adult. You can not go home in an uber or a cab per hospital policy.  It is not safe to drive a vehicle during the 24 hours after anesthesia.          All  facilities and properties are tobacco free.  Smoking is NOT allowed.   If you have any questions about these instructions, call Pre-Op Admit  Nursing at [phone] or the Pre-Op Day Surgery Unit at [phone].

## 2025-02-27 ENCOUNTER — ANESTHESIA EVENT (OUTPATIENT)
Dept: SURGERY | Facility: HOSPITAL | Age: 50
End: 2025-02-27
Payer: COMMERCIAL

## 2025-02-27 DIAGNOSIS — S42.292A OTHER CLOSED DISPLACED FRACTURE OF PROXIMAL END OF LEFT HUMERUS, INITIAL ENCOUNTER: Primary | ICD-10-CM

## 2025-02-27 RX ORDER — ACETAMINOPHEN 500 MG
1000 TABLET ORAL EVERY 8 HOURS PRN
Qty: 30 TABLET | Refills: 0 | Status: SHIPPED | OUTPATIENT
Start: 2025-02-27

## 2025-02-27 RX ORDER — CYCLOBENZAPRINE HCL 10 MG
10 TABLET ORAL 3 TIMES DAILY PRN
Qty: 30 TABLET | Refills: 0 | Status: SHIPPED | OUTPATIENT
Start: 2025-02-27 | End: 2025-03-10

## 2025-02-27 RX ORDER — OXYCODONE AND ACETAMINOPHEN 5; 325 MG/1; MG/1
1 TABLET ORAL EVERY 6 HOURS PRN
Qty: 28 TABLET | Refills: 0 | Status: SHIPPED | OUTPATIENT
Start: 2025-02-27

## 2025-02-27 RX ORDER — ONDANSETRON 4 MG/1
4 TABLET, FILM COATED ORAL EVERY 6 HOURS PRN
Qty: 30 TABLET | Refills: 0 | Status: SHIPPED | OUTPATIENT
Start: 2025-02-27

## 2025-02-28 ENCOUNTER — HOSPITAL ENCOUNTER (OUTPATIENT)
Facility: HOSPITAL | Age: 50
Discharge: HOME | End: 2025-02-28
Attending: ORTHOPAEDIC SURGERY | Admitting: ORTHOPAEDIC SURGERY
Payer: COMMERCIAL

## 2025-02-28 ENCOUNTER — ANESTHESIA (OUTPATIENT)
Dept: SURGERY | Facility: HOSPITAL | Age: 50
End: 2025-02-28
Payer: COMMERCIAL

## 2025-02-28 DIAGNOSIS — S42.292A CLOSED FRACTURE OF HEAD OF LEFT HUMERUS: ICD-10-CM

## 2025-02-28 DIAGNOSIS — S42.292A OTHER CLOSED DISPLACED FRACTURE OF PROXIMAL END OF LEFT HUMERUS, INITIAL ENCOUNTER: ICD-10-CM

## 2025-02-28 DIAGNOSIS — Z01.818 PRE-OP TESTING: ICD-10-CM

## 2025-02-28 PROCEDURE — 37000009 HC ANESTHESIA EA ADD 15 MINS: Performed by: ORTHOPAEDIC SURGERY

## 2025-02-28 PROCEDURE — 71000033 HC RECOVERY, INTIAL HOUR: Performed by: ORTHOPAEDIC SURGERY

## 2025-02-28 PROCEDURE — 27201423 OPTIME MED/SURG SUP & DEVICES STERILE SUPPLY: Performed by: ORTHOPAEDIC SURGERY

## 2025-02-28 PROCEDURE — 36000710: Performed by: ORTHOPAEDIC SURGERY

## 2025-02-28 PROCEDURE — 37000008 HC ANESTHESIA 1ST 15 MINUTES: Performed by: ORTHOPAEDIC SURGERY

## 2025-02-28 PROCEDURE — 63600175 PHARM REV CODE 636 W HCPCS: Performed by: NURSE ANESTHETIST, CERTIFIED REGISTERED

## 2025-02-28 PROCEDURE — 36000711: Performed by: ORTHOPAEDIC SURGERY

## 2025-02-28 PROCEDURE — 71000039 HC RECOVERY, EACH ADD'L HOUR: Performed by: ORTHOPAEDIC SURGERY

## 2025-02-28 PROCEDURE — 63600175 PHARM REV CODE 636 W HCPCS: Performed by: ORTHOPAEDIC SURGERY

## 2025-02-28 PROCEDURE — C1713 ANCHOR/SCREW BN/BN,TIS/BN: HCPCS | Performed by: ORTHOPAEDIC SURGERY

## 2025-02-28 PROCEDURE — 25000003 PHARM REV CODE 250: Performed by: NURSE ANESTHETIST, CERTIFIED REGISTERED

## 2025-02-28 PROCEDURE — 71000015 HC POSTOP RECOV 1ST HR: Performed by: ORTHOPAEDIC SURGERY

## 2025-02-28 PROCEDURE — 63600175 PHARM REV CODE 636 W HCPCS: Performed by: ANESTHESIOLOGY

## 2025-02-28 PROCEDURE — 27200750 HC INSULATED NEEDLE/ STIMUPLEX: Performed by: ANESTHESIOLOGY

## 2025-02-28 PROCEDURE — 64415 NJX AA&/STRD BRCH PLXS IMG: CPT | Performed by: ANESTHESIOLOGY

## 2025-02-28 PROCEDURE — C1769 GUIDE WIRE: HCPCS | Performed by: ORTHOPAEDIC SURGERY

## 2025-02-28 PROCEDURE — 23630 OPTX GR HMRL TBRS FX INT FIX: CPT | Mod: LT,,, | Performed by: ORTHOPAEDIC SURGERY

## 2025-02-28 PROCEDURE — 27200651 HC AIRWAY, LMA: Performed by: ANESTHESIOLOGY

## 2025-02-28 PROCEDURE — 25000003 PHARM REV CODE 250: Performed by: ANESTHESIOLOGY

## 2025-02-28 PROCEDURE — 94799 UNLISTED PULMONARY SVC/PX: CPT

## 2025-02-28 RX ORDER — FENTANYL CITRATE 50 UG/ML
INJECTION, SOLUTION INTRAMUSCULAR; INTRAVENOUS
Status: DISCONTINUED | OUTPATIENT
Start: 2025-02-28 | End: 2025-02-28

## 2025-02-28 RX ORDER — ONDANSETRON HYDROCHLORIDE 2 MG/ML
4 INJECTION, SOLUTION INTRAVENOUS ONCE AS NEEDED
Status: DISCONTINUED | OUTPATIENT
Start: 2025-02-28 | End: 2025-02-28 | Stop reason: HOSPADM

## 2025-02-28 RX ORDER — CEFAZOLIN SODIUM 1 G/3ML
2 INJECTION, POWDER, FOR SOLUTION INTRAMUSCULAR; INTRAVENOUS ONCE
Status: COMPLETED | OUTPATIENT
Start: 2025-02-28 | End: 2025-02-28

## 2025-02-28 RX ORDER — GLUCAGON 1 MG
1 KIT INJECTION
Status: DISCONTINUED | OUTPATIENT
Start: 2025-02-28 | End: 2025-02-28 | Stop reason: HOSPADM

## 2025-02-28 RX ORDER — ACETAMINOPHEN 10 MG/ML
INJECTION, SOLUTION INTRAVENOUS
Status: DISCONTINUED | OUTPATIENT
Start: 2025-02-28 | End: 2025-02-28

## 2025-02-28 RX ORDER — PHENYLEPHRINE HYDROCHLORIDE 10 MG/ML
INJECTION INTRAVENOUS
Status: DISCONTINUED | OUTPATIENT
Start: 2025-02-28 | End: 2025-02-28

## 2025-02-28 RX ORDER — PROPOFOL 10 MG/ML
VIAL (ML) INTRAVENOUS
Status: DISCONTINUED | OUTPATIENT
Start: 2025-02-28 | End: 2025-02-28

## 2025-02-28 RX ORDER — SODIUM CHLORIDE, SODIUM LACTATE, POTASSIUM CHLORIDE, CALCIUM CHLORIDE 600; 310; 30; 20 MG/100ML; MG/100ML; MG/100ML; MG/100ML
INJECTION, SOLUTION INTRAVENOUS CONTINUOUS
Status: DISCONTINUED | OUTPATIENT
Start: 2025-02-28 | End: 2025-02-28 | Stop reason: HOSPADM

## 2025-02-28 RX ORDER — DIPHENHYDRAMINE HYDROCHLORIDE 50 MG/ML
12.5 INJECTION INTRAMUSCULAR; INTRAVENOUS EVERY 6 HOURS PRN
Status: DISCONTINUED | OUTPATIENT
Start: 2025-02-28 | End: 2025-02-28 | Stop reason: HOSPADM

## 2025-02-28 RX ORDER — CEFAZOLIN SODIUM 2 G/50ML
2 SOLUTION INTRAVENOUS ONCE
Status: DISCONTINUED | OUTPATIENT
Start: 2025-02-28 | End: 2025-02-28 | Stop reason: SDUPTHER

## 2025-02-28 RX ORDER — FENTANYL CITRATE 50 UG/ML
25 INJECTION, SOLUTION INTRAMUSCULAR; INTRAVENOUS EVERY 5 MIN PRN
Status: DISCONTINUED | OUTPATIENT
Start: 2025-02-28 | End: 2025-02-28 | Stop reason: HOSPADM

## 2025-02-28 RX ORDER — HYDROMORPHONE HYDROCHLORIDE 2 MG/ML
0.2 INJECTION, SOLUTION INTRAMUSCULAR; INTRAVENOUS; SUBCUTANEOUS EVERY 5 MIN PRN
Status: DISCONTINUED | OUTPATIENT
Start: 2025-02-28 | End: 2025-02-28 | Stop reason: HOSPADM

## 2025-02-28 RX ORDER — ONDANSETRON HYDROCHLORIDE 2 MG/ML
INJECTION, SOLUTION INTRAMUSCULAR; INTRAVENOUS
Status: DISCONTINUED | OUTPATIENT
Start: 2025-02-28 | End: 2025-02-28

## 2025-02-28 RX ORDER — OXYCODONE HYDROCHLORIDE 5 MG/1
5 TABLET ORAL
Status: DISCONTINUED | OUTPATIENT
Start: 2025-02-28 | End: 2025-02-28 | Stop reason: HOSPADM

## 2025-02-28 RX ORDER — BUPIVACAINE HYDROCHLORIDE 5 MG/ML
INJECTION, SOLUTION EPIDURAL; INTRACAUDAL
Status: COMPLETED | OUTPATIENT
Start: 2025-02-28 | End: 2025-02-28

## 2025-02-28 RX ORDER — FENTANYL CITRATE 50 UG/ML
25-200 INJECTION, SOLUTION INTRAMUSCULAR; INTRAVENOUS
Status: DISCONTINUED | OUTPATIENT
Start: 2025-02-28 | End: 2025-02-28 | Stop reason: HOSPADM

## 2025-02-28 RX ORDER — HYDROCODONE BITARTRATE AND ACETAMINOPHEN 5; 325 MG/1; MG/1
1 TABLET ORAL EVERY 4 HOURS PRN
Refills: 0 | Status: CANCELLED | OUTPATIENT
Start: 2025-02-28

## 2025-02-28 RX ORDER — LIDOCAINE HYDROCHLORIDE 20 MG/ML
INJECTION INTRAVENOUS
Status: DISCONTINUED | OUTPATIENT
Start: 2025-02-28 | End: 2025-02-28

## 2025-02-28 RX ORDER — MUPIROCIN 20 MG/G
OINTMENT TOPICAL
Status: DISCONTINUED | OUTPATIENT
Start: 2025-02-28 | End: 2025-02-28 | Stop reason: HOSPADM

## 2025-02-28 RX ORDER — BUPIVACAINE 13.3 MG/ML
INJECTION, SUSPENSION, LIPOSOMAL INFILTRATION
Status: COMPLETED | OUTPATIENT
Start: 2025-02-28 | End: 2025-02-28

## 2025-02-28 RX ORDER — LIDOCAINE HYDROCHLORIDE 10 MG/ML
1 INJECTION, SOLUTION EPIDURAL; INFILTRATION; INTRACAUDAL; PERINEURAL ONCE
Status: DISCONTINUED | OUTPATIENT
Start: 2025-02-28 | End: 2025-02-28 | Stop reason: HOSPADM

## 2025-02-28 RX ORDER — MIDAZOLAM HYDROCHLORIDE 1 MG/ML
.5-4 INJECTION, SOLUTION INTRAMUSCULAR; INTRAVENOUS
Status: DISCONTINUED | OUTPATIENT
Start: 2025-02-28 | End: 2025-02-28 | Stop reason: HOSPADM

## 2025-02-28 RX ORDER — KETAMINE HCL IN 0.9 % NACL 50 MG/5 ML
SYRINGE (ML) INTRAVENOUS
Status: DISCONTINUED | OUTPATIENT
Start: 2025-02-28 | End: 2025-02-28

## 2025-02-28 RX ADMIN — FENTANYL CITRATE 50 MCG: 50 INJECTION, SOLUTION INTRAMUSCULAR; INTRAVENOUS at 12:02

## 2025-02-28 RX ADMIN — PHENYLEPHRINE HYDROCHLORIDE 200 MCG: 10 INJECTION INTRAVENOUS at 12:02

## 2025-02-28 RX ADMIN — LIDOCAINE HYDROCHLORIDE 100 MG: 20 INJECTION, SOLUTION INTRAVENOUS at 11:02

## 2025-02-28 RX ADMIN — CEFAZOLIN 2 G: 330 INJECTION, POWDER, FOR SOLUTION INTRAMUSCULAR; INTRAVENOUS at 11:02

## 2025-02-28 RX ADMIN — Medication 25 MG: at 11:02

## 2025-02-28 RX ADMIN — PROPOFOL 150 MG: 10 INJECTION, EMULSION INTRAVENOUS at 11:02

## 2025-02-28 RX ADMIN — BUPIVACAINE 10 ML: 13.3 INJECTION, SUSPENSION, LIPOSOMAL INFILTRATION at 09:02

## 2025-02-28 RX ADMIN — FENTANYL CITRATE 50 MCG: 50 INJECTION, SOLUTION INTRAMUSCULAR; INTRAVENOUS at 11:02

## 2025-02-28 RX ADMIN — BUPIVACAINE HYDROCHLORIDE 10 ML: 5 INJECTION, SOLUTION EPIDURAL; INTRACAUDAL; PERINEURAL at 09:02

## 2025-02-28 RX ADMIN — PROPOFOL 50 MG: 10 INJECTION, EMULSION INTRAVENOUS at 11:02

## 2025-02-28 RX ADMIN — SODIUM CHLORIDE, SODIUM GLUCONATE, SODIUM ACETATE, POTASSIUM CHLORIDE, MAGNESIUM CHLORIDE, SODIUM PHOSPHATE, DIBASIC, AND POTASSIUM PHOSPHATE: .53; .5; .37; .037; .03; .012; .00082 INJECTION, SOLUTION INTRAVENOUS at 10:02

## 2025-02-28 RX ADMIN — GLYCOPYRROLATE 0.2 MG: 0.2 INJECTION, SOLUTION INTRAMUSCULAR; INTRAVITREAL at 11:02

## 2025-02-28 RX ADMIN — ACETAMINOPHEN 1000 MG: 10 INJECTION INTRAVENOUS at 11:02

## 2025-02-28 RX ADMIN — FENTANYL CITRATE 100 MCG: 50 INJECTION, SOLUTION INTRAMUSCULAR; INTRAVENOUS at 09:02

## 2025-02-28 RX ADMIN — MIDAZOLAM HYDROCHLORIDE 2 MG: 1 INJECTION, SOLUTION INTRAMUSCULAR; INTRAVENOUS at 09:02

## 2025-02-28 RX ADMIN — PHENYLEPHRINE HYDROCHLORIDE 200 MCG: 10 INJECTION INTRAVENOUS at 11:02

## 2025-02-28 RX ADMIN — ONDANSETRON 4 MG: 2 INJECTION INTRAMUSCULAR; INTRAVENOUS at 11:02

## 2025-02-28 NOTE — PROGRESS NOTES
Discharge instructions given to pt and   verbalized understanding and questions answered. Handouts provided. Belongings given back to pt. IV removed- catheter intact.Ambulated to bathroom voided qs  Discharge via wheelchair.

## 2025-02-28 NOTE — OP NOTE
[facility]  Orthopedic Surgery  Operative Note    SUMMARY     Date of Procedure: 2/28/2025     Procedure: Procedure(s) (LRB):  ORIF, FRACTURE, HUMERUS, GREATER TUBEROSITY (Left)       Surgeons and Role:     * Kenji Maradiaga MD - Primary    Assistant:  Anupama Alcaraz    Pre-Operative Diagnosis: Other closed displaced fracture of proximal end of left humerus, initial encounter [S42.292A]  Pre-op testing [Z01.818]    Post-Operative Diagnosis: Post-Op Diagnosis Codes:     * Other closed displaced fracture of proximal end of left humerus, initial encounter [S42.292A]     * Pre-op testing [Z01.818]    Anesthesia: General/Regional    Description of the Findings of the Procedure:  The only real fragment that was displaced or unstable was a greater tuberosity fragment.  Decided just to put a couple screws and suture fixation rather than a proximal humeral plate.    Complications: No    Estimated Blood Loss (EBL): * No values recorded between 2/28/2025 11:08 AM and 2/28/2025 12:41 PM *           Implants:   Implant Name Type Inv. Item Serial No.  Lot No. LRB No. Used Action   GUIDEWIRE ORTHO [id number] - [id number]  GUIDEWIRE ORTHO [id number]  [employer].  Left 3 Implanted and Explanted   4.0 x 46 cancelleous screw      Left 1 Implanted and Explanted   4.0 x 44 cancelleous screw      Left 1 Implanted   4.0 x 42 cancellous screw      Left 1 Implanted       Specimens:   Specimen (24h ago, onward)      None                    Condition: Good    Disposition: PACU - hemodynamically stable.    Attestation: I was present and scrubbed for the entire procedure.    INDICATIONS FOR THE PROCEDURE: A 49-year-old female with history of Left  Proximal humerus fracture after a fall.  We discussed non operative versus operative treatment and their pros and cons. the patient   elected for the procedure listed above.      PROCEDURE IN DETAIL: Risks, benefits and alternatives of the procedure  were   explained to the patient including, but not limited to damage to nerves,   arteries or blood vessels. Also, explained risk of instability, infection,   hardware failure, nonunion, malunion, impingement possible hardware removal later, DVT, PE as well as anesthetic   complications including seizure, stroke, heart attack and death. She understood  this and signed informed consent. The patient's Left shoulder was marked   prior to coming to the Operating Room. Once there a formal timeout was done in which   correct patient, procedure and operating site were all correctly identified and   confirmed by the entire operating team, 2 g of Ancef given prior to surgical   incision. General endotracheal anesthesia was induced. The patient was placed   in a relaxed beach chair type position. The patient's Left upper extremity was  prepped and draped in normal sterile fashion. Standard deltopectoral incision   was made. This was taken down through the skin and some fat. Cephalic vein was  identified and retracted laterally with the deltoid. Some clavipectoral fascia  was dissected and the conjoint tendon was retracted medially, protecting the   musculocutaneous nerve. The subdeltoid and subacromial spaces were then bluntly  dissected, so the retractor could be placed and full exposure of the proximal   humerus was obtained. The biceps tendon was identified and was tracked   proximally into the rotator interval.  We are then able to find the greater tuberosity fracture.  We then released some tissue that was adhering this to the rest of the proximal humerus.  We then copiously irrigated out the fracture bed and debrided with some rongeur.  I then placed a couple FiberWire stitches into the fragment so that it could be mobilized and pulled down.  We are able to get an adequate reduction by applying anterior and inferior force.  While holding it reduced I then put 2 K-wires into stabilize it.  We then confirmed acceptable  reduction under multiple fluoroscopy shots.  While holding it reduced and with the K-wires in we then drilled and placed 2 4-0 partially threaded screws to lag the fracture back in place.  These were confirmed to be short of the articular surface and flush with the greater tuberosity fragment.  We then took out the K-wires and move the shoulder.  Fracture was stable.  We then augmented the fixation with our FiberWire.  The FiberWire that was through the supraspinatus and the greater tuberosity was then sutured into the subscapularis tendon to close down the rotator interval and provide extra fixation to the fragment.  Reinforced the rotator interval repair with a 2. Ethibond.  We then loosely closed the deltopectoral interval with an 0 Vicryl.      We then closed the subcutaneous tissue with 2-0 Vicryl and skin   was closed using a Stratafix and Dermabond. Sterile dressing was applied. She   was placed in a sling, extubated, awakened, transferred from the Operating Room   to the Recovery Room in stable condition.

## 2025-02-28 NOTE — DISCHARGE SUMMARY
[facility]  Discharge Note  Short Stay    Procedure(s) (LRB):  ORIF, FRACTURE, HUMERUS, PROXIMAL (Left)      OUTCOME: Patient tolerated treatment/procedure well without complication and is now ready for discharge.    DISPOSITION: Home or Self Care    FINAL DIAGNOSIS:  <principal problem not specified>    FOLLOWUP: In clinic    DISCHARGE INSTRUCTIONS:    Discharge Procedure Orders   Diet general     Ice to affected area     Lifting restrictions     No driving, operating heavy equipment or signing legal documents while taking pain medication     Remove dressing in 48 hours     Change dressing (specify)   Order Comments: Dressing change: 1 times per day using waterproof bandaids.     Call MD for:  temperature >100.4     Call MD for:  persistent nausea and vomiting     Call MD for:  severe uncontrolled pain     Call MD for:  difficulty breathing, headache or visual disturbances     Call MD for:  redness, tenderness, or signs of infection (pain, swelling, redness, odor or green/yellow discharge around incision site)     Call MD for:  hives     Call MD for:  persistent dizziness or light-headedness     Call MD for:  extreme fatigue        TIME SPENT ON DISCHARGE: 5 minutes

## 2025-02-28 NOTE — TRANSFER OF CARE
Anesthesia Transfer of Care Note    Patient: Zelda Acevedo    Procedure(s) Performed: Procedure(s) (LRB):  ORIF, FRACTURE, HUMERUS, PROXIMAL (Left)    Patient location: PACU    Anesthesia Type: general    Transport from OR: Transported from OR on 2-3 L/min O2 by NC with adequate spontaneous ventilation    Post pain: adequate analgesia    Post assessment: no apparent anesthetic complications    Post vital signs: stable    Level of consciousness: responds to stimulation    Nausea/Vomiting: no nausea/vomiting    Complications: none    Transfer of care protocol was followed    Last vitals: Visit Vitals  /75   Pulse 82   Resp 10   Wt 85.3 kg (188 lb)   SpO2 99%   Breastfeeding No   BMI 35.52 kg/m²

## 2025-02-28 NOTE — ANESTHESIA PROCEDURE NOTES
Intubation    Date/Time: 2/28/2025 11:25 AM    Performed by: Mina Martínez CRNA  Authorized by: Roslyn Chino MD    Intubation:     Induction:  Intravenous    Intubated:  Postinduction    Mask Ventilation:  Easy mask    Attempts:  1    Attempted By:  Staff anesthesiologist    Difficult Airway Encountered?: No      Complications:  None    Airway Device:  Supraglottic airway/LMA    Airway Device Size:  3.0    Style/Cuff Inflation:  Cuffed    Secured at:  The lips    Placement Verified By:  Capnometry    Complicating Factors:  Obesity, short neck and poor neck/head extension (poor mouth opening)    Findings Post-Intubation:  BS equal bilateral

## 2025-02-28 NOTE — ANESTHESIA PREPROCEDURE EVALUATION
02/28/2025  Zelda Acevedo is a 49 y.o., female.      Pre-op Assessment    I have reviewed the Patient Summary Reports.     I have reviewed the Nursing Notes. I have reviewed the NPO Status.   I have reviewed the Medications.     Review of Systems  Anesthesia Hx:  No problems with previous Anesthesia                Social:  Non-Smoker       Hematology/Oncology:  Hematology Normal   Oncology Normal                                   EENT/Dental:  EENT/Dental Normal           Cardiovascular:     Hypertension           hyperlipidemia                         Hypertension         Pulmonary:  Pulmonary Normal                       Musculoskeletal:     Closed displaced fracture of proximal humerus             Neurological:  Neurology Normal                                      Endocrine:        Obesity / BMI > 30  Psych:  Psychiatric History anxiety                 Physical Exam  General: Well nourished, Cooperative, Alert and Oriented    Airway:  Mallampati: II   Mouth Opening: Normal  TM Distance: Normal  Tongue: Normal  Neck ROM: Normal ROM    Dental:  Intact    Chest/Lungs:  Normal Respiratory Rate    Heart:  Rate: Normal        Anesthesia Plan  Type of Anesthesia, risks & benefits discussed:    Anesthesia Type: Gen Supraglottic Airway, Gen ETT  Intra-op Monitoring Plan: Standard ASA Monitors  Post Op Pain Control Plan: multimodal analgesia, IV/PO Opioids PRN and peripheral nerve block  Induction:  IV  Airway Plan: , Post-Induction  Informed Consent: Informed consent signed with the Patient and all parties understand the risks and agree with anesthesia plan.  All questions answered.   ASA Score: 2  Day of Surgery Review of History & Physical: H&P Update referred to the surgeon/provider.    Ready For Surgery From Anesthesia Perspective.     .

## 2025-02-28 NOTE — ANESTHESIA PROCEDURE NOTES
Peripheral Block    Patient location during procedure: pre-op   Block not for primary anesthetic.  Reason for block: at surgeon's request and post-op pain management   Post-op Pain Location: left shoulder   Start time: 2/28/2025 9:15 AM  Timeout: 2/28/2025 9:15 AM   End time: 2/28/2025 9:20 AM    Staffing  Authorizing Provider: Uday Quintanilla MD  Performing Provider: Uday Quintanilla MD    Staffing  Performed by: Uday Quintanilla MD  Authorized by: Uday Quintanilla MD    Preanesthetic Checklist  Completed: patient identified, IV checked, site marked, risks and benefits discussed, surgical consent, monitors and equipment checked, pre-op evaluation and timeout performed  Peripheral Block  Patient position: sitting  Prep: ChloraPrep  Patient monitoring: heart rate, cardiac monitor, continuous pulse ox, continuous capnometry and frequent blood pressure checks  Block type: interscalene  Laterality: left  Injection technique: single shot  Needle  Needle type: Stimuplex   Needle gauge: 22 G  Needle length: 2 in  Needle localization: anatomical landmarks and ultrasound guidance   -ultrasound image captured on disc.  Assessment  Injection assessment: negative aspiration, negative parasthesia and local visualized surrounding nerve  Paresthesia pain: none  Heart rate change: no  Slow fractionated injection: yes  Pain Tolerance: comfortable throughout block and no complaints  Medications:    Medications: bupivacaine (pf) (MARCAINE) injection 0.5% - Perineural   10 mL - 2/28/2025 9:20:00 AM  BUPivacaine liposome (PF) 1.3 % (13.3 mg/mL) suspension - Injection   10 mL - 2/28/2025 9:20:00 AM    Additional Notes  VSS.  [facility] RN monitoring vitals throughout procedure.  Patient tolerated procedure well.

## 2025-02-28 NOTE — DISCHARGE INSTRUCTIONS
1.Diet: Ice chips, clear liquids, and then diet as tolerated. Drink plenty of liquids.  2.Ice the area at least three times a day (20 minutes per session).  3.Elevate the extremity above the level of the heart to help reduce swelling.  4.Pain medication can be taken every four to six hours as needed. It is helpful to take pain medication prior to physical therapy.  Use Tylenol or anti-inflammatories for pain as much as possible.  Pain medication is for pain not responding to over-the-counter medications only.  5.Any activity that requires precise thinking or accuracy should be avoided for a minimum of 72 hours after surgery and while on narcotic pain medication. This includes operating machinery and/or driving a vehicle.  6.All sutures/staples will be removed approximately 14 days from the time of surgery. Leave steri-strips (skin tapes) in place until sutures are removed.  7. If skin glue is used instead of stitches, do not apply ointments or solutions to the incision. Keep the incision dry. The skin glue will peel off in 3-4 weeks.  8. Change dressing on the 2nd post-op day. Use gauze for the first 3 days, then start using Band-Aids over the incision sites.   9. All casts, splints, braces, slings, crutches, abduction pillows, etc... Are to be worn as instructed. Use sling at all times except for showering and exercises.  10. Keep the incision dry for 10-14 days. A waterproof dressing (purchase at [employer], [employer], etc) can be used to shower. No bath, pool, hot tub until instructed.  11. Start PT in 14 days. Call office to help with scheduling.  12. Call [phone] with any questions or concerns.      General Information:    1.  Do not drink alcoholic beverages including beer for 24 hours or as long as you are on pain medication..  2.  Do not drive a motor vehicle, operate machinery or power tools, or signs legal papers for 24 hours or as long as you are on pain medication.   3.  You may experience light-headedness,  dizziness, and sleepiness following surgery. Please do not stay alone. A responsible adult should be with you for this 24 hour period.  4.  Go home and rest.    5. Progress slowly to a normal diet unless instructed.  Otherwise, begin with liquids such as soft drinks, then soup and crackers working up to solid foods. Drink plenty of nonalcoholic fluids.  6.  Certain anesthetics and pain medications produce nausea and vomiting in certain       individuals. If nausea becomes a problem at home, call you doctor.    7. A nurse will be calling you sometime after surgery. Do not be alarmed. This is our way of finding out how you are doing.    8. Several times every hour while you are awake, take 2-3 deep breaths and cough. If you had stomach surgery hold a pillow or rolled towel firmly against your stomach before you cough. This will help with any pain the cough might cause.  9. Several times every hour while you are awake, pump and flex your feet 5-6 times and do foot circles. This will help prevent blood clots.    10.Call your doctor for severe pain, bleeding, fever, or signs or symptoms of infection (pain, swelling, redness, foul odor, drainage).      Using an Incentive Spirometer    An incentive spirometer is a device that helps you do deep breathing exercises. These exercises expand your lungs, aid in circulation, and help prevent pneumonia. Deep breathing exercises also help you breathe better and improve the function of your lungs by:  Keeping your lungs clear  Strengthening your breathing muscles  Helping prevent respiratory complications or problems  The incentive spirometer gives you a way to take an active part in recover. A nurse or therapist will teach you breathing exercises. To do these exercises, you will breathe in through your mouth and not your nose. The incentive spirometer only works correctly if you breathe in through your mouth.  Steps to clear lungs  Step 1. Exhale normally. Then, inhale  normally.  Relax and breathe out.  Step 2. Place your lips tightly around the mouthpiece.  Make sure the device is upright and not tilted.  Step 3. Inhale as much air as you can through the mouthpiece (don't breath through your nose).  Inhale slowly and deeply.  Hold your breath long enough to keep the balls or disk raised for at least 3 to 5 seconds, or as instructed by your healthcare provider.  Some spirometers have an indicator to let you know that you are breathing in too fast. If the indicator goes off, breathe in more slowly.  Step 4. Repeat the exercise regularly.  Do this exercise every hour while you're awake, or as instructed by your healthcare provider.  If you were taught deep breathing and coughing exercises, do them regularly as instructed by your healthcare provider.       Post op instructions for prevention of DVT    What is deep vein thrombosis?  Deep vein thrombosis (DVT) is the medical term for blood clots in the deep veins of the leg.  These blood clots can be dangerous.  A DVT can block a blood vessel and keep blood from getting where it needs to go.  Another problem is that the clot can travel to other parts of the body such as the lungs.  A clot that travels to the lungs is called a pulmonary embolus (PE) and can cause serious problems with breathing which can lead to death.  Am I at risk for DVT/PE?  If you are not very active, you are at risk of DVT.  Anyone confined to bed, sitting for long periods of time, recovering from surgery, etc. increases the risk of DVT.  Other risk factors are cancer diagnosis, certain medications, estrogen replacement in any form,older age, obesity, pregnancy, smoking, history of clotting disorders, and dehydration.  How will I know if I have a DVT?  Swelling in the lower leg  Pain  Warmth, redness, hardness or bulging of the vein  If you have any of these symptoms, call your doctors office right away.  Some people will not have any symptoms until the clot  moves to the lungs.  What are the symptoms of a PE?  Panting, shortness of breath, or trouble breathing  Sharp, knife-like chest pain when you breathe  Coughing or coughing up blood  Rapid heartbeat  If you have any of these symptoms or get worse quickly, call 911 for emergency treatment.  How can I prevent a DVT?  Avoid long periods of inactivity and dont cross your legs--get up and walk around every hour or so.  Stay active--walking after surgery is highly encouraged.  This means you should get out of the house and walk in the neighborhood.  Going up and down stairs will not impair healing (unless advised against such activity by your doctor).    Drink plenty of noncaffeinated, nonalcoholic fluids each day to prevent dehydration.  Wear special support stockings, if they have been advised by your doctor.  If you travel, stop at least once an hour and walk around.  Avoid smoking (assistance with stopping is available through your healthcare provider)  Always notify your doctor if you are not able to follow the post operative instructions that are given to you at the time of discharge.  It may be necessary to prescribe one of the medications available to prevent DVT.

## 2025-02-28 NOTE — ANESTHESIA POSTPROCEDURE EVALUATION
Anesthesia Post Evaluation    Patient: Zelda Acevedo    Procedure(s) Performed: Procedure(s) (LRB):  ORIF, FRACTURE, HUMERUS, PROXIMAL (Left)    Final Anesthesia Type: general      Patient location during evaluation: PACU  Patient participation: Yes- Able to Participate  Level of consciousness: awake and alert  Post-procedure vital signs: reviewed and stable  Pain management: adequate  Airway patency: patent    PONV status at discharge: No PONV  Anesthetic complications: no      Cardiovascular status: blood pressure returned to baseline  Respiratory status: unassisted and room air  Hydration status: euvolemic  Follow-up not needed.              Vitals Value Taken Time   /85 02/28/25 13:58   Temp 36.6 °C (97.9 °F) 02/28/25 13:00   Pulse 84 02/28/25 13:59   Resp 13 02/28/25 13:59   SpO2 96 % 02/28/25 13:59   Vitals shown include unfiled device data.      No case tracking events are documented in the log.      Pain/Blaze Score: Blaze Score: 10 (2/28/2025  1:45 PM)

## 2025-03-05 VITALS
WEIGHT: 188 LBS | OXYGEN SATURATION: 98 % | DIASTOLIC BLOOD PRESSURE: 92 MMHG | SYSTOLIC BLOOD PRESSURE: 160 MMHG | BODY MASS INDEX: 35.52 KG/M2 | RESPIRATION RATE: 16 BRPM | HEART RATE: 82 BPM | TEMPERATURE: 98 F

## 2025-03-06 ENCOUNTER — PATIENT MESSAGE (OUTPATIENT)
Dept: ORTHOPEDICS | Facility: CLINIC | Age: 50
End: 2025-03-06
Payer: COMMERCIAL

## 2025-03-07 DIAGNOSIS — M25.512 LEFT SHOULDER PAIN, UNSPECIFIED CHRONICITY: Primary | ICD-10-CM

## 2025-03-13 ENCOUNTER — HOSPITAL ENCOUNTER (OUTPATIENT)
Dept: RADIOLOGY | Facility: HOSPITAL | Age: 50
Discharge: HOME | End: 2025-03-13
Attending: ORTHOPAEDIC SURGERY
Payer: COMMERCIAL

## 2025-03-13 ENCOUNTER — OFFICE VISIT (OUTPATIENT)
Dept: ORTHOPEDICS | Facility: CLINIC | Age: 50
End: 2025-03-13
Payer: COMMERCIAL

## 2025-03-13 VITALS — BODY MASS INDEX: 35.5 KG/M2 | HEIGHT: 61 IN | WEIGHT: 188.06 LBS

## 2025-03-13 DIAGNOSIS — M25.512 LEFT SHOULDER PAIN, UNSPECIFIED CHRONICITY: ICD-10-CM

## 2025-03-13 DIAGNOSIS — S42.292A OTHER CLOSED DISPLACED FRACTURE OF PROXIMAL END OF LEFT HUMERUS, INITIAL ENCOUNTER: ICD-10-CM

## 2025-03-13 DIAGNOSIS — M25.512 LEFT SHOULDER PAIN, UNSPECIFIED CHRONICITY: Primary | ICD-10-CM

## 2025-03-13 PROCEDURE — 73030 X-RAY EXAM OF SHOULDER: CPT | Mod: 26,LT,, | Performed by: RADIOLOGY

## 2025-03-13 PROCEDURE — 99999 PR PBB SHADOW E&M-EST. PATIENT-LVL III: CPT | Mod: PBBFAC,,, | Performed by: ORTHOPAEDIC SURGERY

## 2025-03-13 PROCEDURE — 73030 X-RAY EXAM OF SHOULDER: CPT | Mod: TC,PO,LT

## 2025-03-13 PROCEDURE — 4010F ACE/ARB THERAPY RXD/TAKEN: CPT | Mod: CPTII,S$GLB,, | Performed by: ORTHOPAEDIC SURGERY

## 2025-03-13 PROCEDURE — 99024 POSTOP FOLLOW-UP VISIT: CPT | Mod: S$GLB,,, | Performed by: ORTHOPAEDIC SURGERY

## 2025-03-13 PROCEDURE — 1159F MED LIST DOCD IN RCRD: CPT | Mod: CPTII,S$GLB,, | Performed by: ORTHOPAEDIC SURGERY

## 2025-03-13 PROCEDURE — 1160F RVW MEDS BY RX/DR IN RCRD: CPT | Mod: CPTII,S$GLB,, | Performed by: ORTHOPAEDIC SURGERY

## 2025-03-13 NOTE — PROGRESS NOTES
Past Medical History:   Diagnosis Date    Anxiety disorder, unspecified     Hypertension     Mixed hyperlipidemia        Past Surgical History:   Procedure Laterality Date     SECTION       SECTION      OPEN REDUCTION AND INTERNAL FIXATION (ORIF) OF FRACTURE OF PROXIMAL HUMERUS Left 2025    Procedure: ORIF, FRACTURE, HUMERUS, PROXIMAL;  Surgeon: Kenji Maradiaga MD;  Location: [facility];  Service: Orthopedics;  Laterality: Left;  mon[place]- prox hum plate  [place] NOTIFIED 25 ARK    TONSILLECTOMY         Current Outpatient Medications   Medication Sig    acetaminophen (TYLENOL) 500 MG tablet Take 2 tablets (1,000 mg total) by mouth every 8 (eight) hours as needed for Pain.    calcium-vitamin D3-vitamin K 500 mg-1,000 unit-40 mcg Chew Take by mouth once.    cetirizine (ZYRTEC) 10 MG tablet Take 10 mg by mouth once daily.    EScitalopram oxalate (LEXAPRO) 20 MG tablet Take 1 tablet (20 mg total) by mouth once daily.    HYDROcodone-acetaminophen (NORCO)  mg per tablet Take 1 tablet by mouth every 6 (six) hours as needed for Pain.    lisinopriL 10 MG tablet Take 1 tablet (10 mg total) by mouth once daily.    meloxicam (MOBIC) 15 MG tablet TAKE 1 TABLET BY MOUTH EVERY DAY (Patient not taking: Reported on 3/13/2025)    ondansetron (ZOFRAN) 4 MG tablet Take 1 tablet (4 mg total) by mouth every 6 (six) hours as needed for Nausea.    oxyCODONE-acetaminophen (PERCOCET) 5-325 mg per tablet Take 1 tablet by mouth every 6 (six) hours as needed for Pain.    oxyCODONE-acetaminophen (PERCOCET) 5-325 mg per tablet Take 1 tablet by mouth every 6 (six) hours as needed for Pain.    rosuvastatin (CRESTOR) 20 MG tablet Take 1 tablet (20 mg total) by mouth once daily.    soy isofla/blk cohosh/mag bark (ESTROVEN ORAL) Take by mouth once daily. (Patient not taking: Reported on 3/13/2025)    triamcinolone acetonide 0.1% (KENALOG) 0.1 % cream Apply topically 2 (two) times daily.    UNABLE TO FIND medication  name: Danilo Women's Multi Gummy    UNABLE TO FIND Take 1 mL by mouth once daily. medication name: Milk Thistle Extract 1000 mg, dissolve in water. (Patient not taking: Reported on 3/13/2025)    valACYclovir (VALTREX) 1000 MG tablet Take 2 tablets (2,000 mg total) by mouth every 12 (twelve) hours.     No current facility-administered medications for this visit.       Review of patient's allergies indicates:  No Known Allergies    Family History   Problem Relation Name Age of Onset    Arthritis Mother Emilie Gomez     Arthritis Father Albert Gomez     Cancer Father Albert Gomez         Melnoma cancer of the eye    Hearing loss Father Albert Gomez     Cancer Sister Ana Paula         Skin cancer       Social History[1]    Chief Complaint: No chief complaint on file.      Date of surgery:  February 28, 2025    History of present illness:  49-year-old female underwent open reduction internal fixation of a proximal humerus fracture.  She is doing well.  Just taking Tylenol for pain.  Does get some muscle spasms and has some tightness in the elbow.  Has not really been stretching her elbow out though.    Review of Systems:    Musculoskeletal:  See HPI        Physical Examination:    Vital Signs:  There were no vitals filed for this visit.    There is no height or weight on file to calculate BMI.    This a well-developed, well nourished patient in no acute distress.  They are alert and oriented and cooperative to examination.  Pt. walks without an antalgic gait.      Examination left shoulder shows well-healing surgical incision.  No erythema drainage.  Neurovascularly intact.  No significant bruising or swelling.    X-rays:  X-rays left shoulder ordered and reviewed which show well-aligned proximal humerus fracture with 2 screws in place     Assessment::  Status post open reduction internal fixation of left proximal humerus fracture    Plan:  I reviewed the x-ray with her today.  Continue the sling for 2 more weeks.  Can  start some pendulums and table slides.  Follow up in a couple weeks with another x-ray of the left shoulder.  Can likely start some physical therapy if everything still looks good.    This note was created using [employer] voice recognition software that occasionally misinterpreted phrases or words.           [1]   Social History  Socioeconomic History    Marital status:    Tobacco Use    Smoking status: Never     Passive exposure: Never    Smokeless tobacco: Never   Substance and Sexual Activity    Alcohol use: Yes     Alcohol/week: 1.0 standard drink of alcohol     Types: 1 Unspecified drink type per week     Comment: This may only be one to two times a month    Drug use: Never    Sexual activity: Not Currently     Partners: Male     Birth control/protection: Abstinence     Social Drivers of Health     Financial Resource Strain: Low Risk  (6/18/2024)    Overall Financial Resource Strain (CARDIA)     Difficulty of Paying Living Expenses: Not hard at all   Food Insecurity: No Food Insecurity (6/18/2024)    Hunger Vital Sign     Worried About Running Out of Food in the Last Year: Never true     Ran Out of Food in the Last Year: Never true   Transportation Needs: No Transportation Needs (12/22/2022)    PRAPARE - Transportation     Lack of Transportation (Medical): No     Lack of Transportation (Non-Medical): No   Physical Activity: Inactive (6/18/2024)    Exercise Vital Sign     Days of Exercise per Week: 0 days     Minutes of Exercise per Session: 0 min   Stress: No Stress Concern Present (6/18/2024)    [employer] - Occupational Stress Questionnaire     Feeling of Stress : Only a little   Housing Stability: Low Risk  (12/22/2022)    Housing Stability Vital Sign     Unable to Pay for Housing in the Last Year: No     Number of Places Lived in the Last Year: 1     Unstable Housing in the Last Year: No

## 2025-03-18 DIAGNOSIS — M25.512 LEFT SHOULDER PAIN, UNSPECIFIED CHRONICITY: Primary | ICD-10-CM

## 2025-03-27 ENCOUNTER — OFFICE VISIT (OUTPATIENT)
Dept: ORTHOPEDICS | Facility: CLINIC | Age: 50
End: 2025-03-27
Payer: COMMERCIAL

## 2025-03-27 ENCOUNTER — HOSPITAL ENCOUNTER (OUTPATIENT)
Dept: RADIOLOGY | Facility: HOSPITAL | Age: 50
Discharge: HOME | End: 2025-03-27
Attending: ORTHOPAEDIC SURGERY
Payer: COMMERCIAL

## 2025-03-27 VITALS — WEIGHT: 188.06 LBS | HEIGHT: 61 IN | RESPIRATION RATE: 16 BRPM | BODY MASS INDEX: 35.5 KG/M2

## 2025-03-27 DIAGNOSIS — S42.292A OTHER CLOSED DISPLACED FRACTURE OF PROXIMAL END OF LEFT HUMERUS, INITIAL ENCOUNTER: ICD-10-CM

## 2025-03-27 DIAGNOSIS — M25.512 LEFT SHOULDER PAIN, UNSPECIFIED CHRONICITY: ICD-10-CM

## 2025-03-27 DIAGNOSIS — S42.292D OTHER CLOSED DISPLACED FRACTURE OF PROXIMAL END OF LEFT HUMERUS WITH ROUTINE HEALING, SUBSEQUENT ENCOUNTER: ICD-10-CM

## 2025-03-27 DIAGNOSIS — M25.512 LEFT SHOULDER PAIN, UNSPECIFIED CHRONICITY: Primary | ICD-10-CM

## 2025-03-27 PROCEDURE — 99999 PR PBB SHADOW E&M-EST. PATIENT-LVL III: CPT | Mod: PBBFAC,,, | Performed by: ORTHOPAEDIC SURGERY

## 2025-03-27 PROCEDURE — 73030 X-RAY EXAM OF SHOULDER: CPT | Mod: TC,PO,LT

## 2025-03-27 PROCEDURE — 73030 X-RAY EXAM OF SHOULDER: CPT | Mod: 26,LT,, | Performed by: RADIOLOGY

## 2025-03-27 NOTE — PROGRESS NOTES
Past Medical History:   Diagnosis Date    Anxiety disorder, unspecified     Hypertension     Mixed hyperlipidemia        Past Surgical History:   Procedure Laterality Date     SECTION       SECTION      OPEN REDUCTION AND INTERNAL FIXATION (ORIF) OF FRACTURE OF PROXIMAL HUMERUS Left 2025    Procedure: ORIF, FRACTURE, HUMERUS, PROXIMAL;  Surgeon: Kenji Maradiaga MD;  Location: [facility];  Service: Orthopedics;  Laterality: Left;  mon[place]- prox hum plate  [place] NOTIFIED 25 ARK    TONSILLECTOMY         Current Outpatient Medications   Medication Sig    acetaminophen (TYLENOL) 500 MG tablet Take 2 tablets (1,000 mg total) by mouth every 8 (eight) hours as needed for Pain.    calcium-vitamin D3-vitamin K 500 mg-1,000 unit-40 mcg Chew Take by mouth once.    cetirizine (ZYRTEC) 10 MG tablet Take 10 mg by mouth once daily.    EScitalopram oxalate (LEXAPRO) 20 MG tablet Take 1 tablet (20 mg total) by mouth once daily.    HYDROcodone-acetaminophen (NORCO)  mg per tablet Take 1 tablet by mouth every 6 (six) hours as needed for Pain.    lisinopriL 10 MG tablet Take 1 tablet (10 mg total) by mouth once daily.    meloxicam (MOBIC) 15 MG tablet TAKE 1 TABLET BY MOUTH EVERY DAY (Patient not taking: Reported on 3/27/2025)    ondansetron (ZOFRAN) 4 MG tablet Take 1 tablet (4 mg total) by mouth every 6 (six) hours as needed for Nausea.    oxyCODONE-acetaminophen (PERCOCET) 5-325 mg per tablet Take 1 tablet by mouth every 6 (six) hours as needed for Pain.    oxyCODONE-acetaminophen (PERCOCET) 5-325 mg per tablet Take 1 tablet by mouth every 6 (six) hours as needed for Pain.    rosuvastatin (CRESTOR) 20 MG tablet Take 1 tablet (20 mg total) by mouth once daily.    soy isofla/blk cohosh/mag bark (ESTROVEN ORAL) Take by mouth once daily. (Patient not taking: Reported on 3/27/2025)    triamcinolone acetonide 0.1% (KENALOG) 0.1 % cream Apply topically 2 (two) times daily.    UNABLE TO FIND medication  name: Danilo Women's Multi Gummy    UNABLE TO FIND Take 1 mL by mouth once daily. medication name: Milk Thistle Extract 1000 mg, dissolve in water. (Patient not taking: Reported on 3/27/2025)    valACYclovir (VALTREX) 1000 MG tablet Take 2 tablets (2,000 mg total) by mouth every 12 (twelve) hours.     No current facility-administered medications for this visit.       Review of patient's allergies indicates:  No Known Allergies    Family History   Problem Relation Name Age of Onset    Arthritis Mother Emilie Gomez     Arthritis Father Albert Gomez     Cancer Father Albert Gomez         Melnoma cancer of the eye    Hearing loss Father Albert Gomez     Cancer Sister Ana Paula         Skin cancer       Social History[1]    Chief Complaint:   Chief Complaint   Patient presents with    Post-op Evaluation     2wk post-op DOS: 02/28/25 left shoulder ORIF       Date of surgery:  February 28, 2025    History of present illness:  49-year-old female underwent open reduction internal fixation of a proximal humerus fracture.  She is doing well.  Just taking Tylenol for pain.  Pain is a 0/10.  Compliant with the sling.  Occasional muscle aches    Review of Systems:    Musculoskeletal:  See HPI        Physical Examination:    Vital Signs:    Vitals:    03/27/25 0824   Resp: 16       Body mass index is 35.53 kg/m².    This a well-developed, well nourished patient in no acute distress.  They are alert and oriented and cooperative to examination.  Pt. walks without an antalgic gait.      Examination left shoulder shows well-healed surgical incision.  No erythema drainage.  Neurovascularly intact.  No significant bruising or swelling.    X-rays:  X-rays left shoulder ordered and reviewed which show well-aligned proximal humerus fracture with 2 screws in place     Assessment::  Status post open reduction internal fixation of left proximal humerus fracture    Plan:  I reviewed the x-ray with her today.  Okay to stop the sling.  We will  start some formal physical therapy.  Follow up in a month with another x-ray of the left shoulder.        This note was created using Optifreeze voice recognition software that occasionally misinterpreted phrases or words.             [1]   Social History  Socioeconomic History    Marital status:    Tobacco Use    Smoking status: Never     Passive exposure: Never    Smokeless tobacco: Never   Substance and Sexual Activity    Alcohol use: Yes     Alcohol/week: 1.0 standard drink of alcohol     Types: 1 Unspecified drink type per week     Comment: This may only be one to two times a month    Drug use: Never    Sexual activity: Not Currently     Partners: Male     Birth control/protection: Abstinence     Social Drivers of Health     Financial Resource Strain: Low Risk  (6/18/2024)    Overall Financial Resource Strain (CARDIA)     Difficulty of Paying Living Expenses: Not hard at all   Food Insecurity: No Food Insecurity (6/18/2024)    Hunger Vital Sign     Worried About Running Out of Food in the Last Year: Never true     Ran Out of Food in the Last Year: Never true   Transportation Needs: No Transportation Needs (12/22/2022)    PRAPARE - Transportation     Lack of Transportation (Medical): No     Lack of Transportation (Non-Medical): No   Physical Activity: Inactive (6/18/2024)    Exercise Vital Sign     Days of Exercise per Week: 0 days     Minutes of Exercise per Session: 0 min   Stress: No Stress Concern Present (6/18/2024)    [employer] - Occupational Stress Questionnaire     Feeling of Stress : Only a little   Housing Stability: Low Risk  (12/22/2022)    Housing Stability Vital Sign     Unable to Pay for Housing in the Last Year: No     Number of Places Lived in the Last Year: 1     Unstable Housing in the Last Year: No

## 2025-04-04 ENCOUNTER — CLINICAL SUPPORT (OUTPATIENT)
Dept: REHABILITATION | Facility: HOSPITAL | Age: 50
End: 2025-04-04
Payer: COMMERCIAL

## 2025-04-04 DIAGNOSIS — S42.292D OTHER CLOSED DISPLACED FRACTURE OF PROXIMAL END OF LEFT HUMERUS WITH ROUTINE HEALING, SUBSEQUENT ENCOUNTER: ICD-10-CM

## 2025-04-04 DIAGNOSIS — R29.3 POOR POSTURE: ICD-10-CM

## 2025-04-04 DIAGNOSIS — R29.898 DECREASED STRENGTH OF UPPER EXTREMITY: ICD-10-CM

## 2025-04-04 DIAGNOSIS — M25.512 LEFT SHOULDER PAIN, UNSPECIFIED CHRONICITY: ICD-10-CM

## 2025-04-04 DIAGNOSIS — M25.612 DECREASED RANGE OF MOTION OF LEFT SHOULDER: ICD-10-CM

## 2025-04-04 DIAGNOSIS — M25.611 DECREASED RIGHT SHOULDER RANGE OF MOTION: Primary | ICD-10-CM

## 2025-04-04 PROCEDURE — 97161 PT EVAL LOW COMPLEX 20 MIN: CPT | Mod: PN

## 2025-04-04 PROCEDURE — 97110 THERAPEUTIC EXERCISES: CPT | Mod: PN

## 2025-04-04 PROCEDURE — 97112 NEUROMUSCULAR REEDUCATION: CPT | Mod: PN

## 2025-04-04 NOTE — PROGRESS NOTES
Physical Therapy Evaluation    Patient Name: Zelda Acevedo  MRN: [id number]  YOB: 1975  Encounter Date: 4/4/2025    Therapy Diagnosis:   Encounter Diagnoses   Name Primary?    Left shoulder pain, unspecified chronicity     Other closed displaced fracture of proximal end of left humerus with routine healing, subsequent encounter     Decreased right shoulder range of motion Yes    Decreased range of motion of left shoulder     Poor posture     Decreased strength of upper extremity      Physician: Kenji Maradiaga,*    Physician Orders: Eval and Treat  Medical Diagnosis: Left shoulder pain, unspecified chronicity  Other closed displaced fracture of proximal end of left humerus with routine healing, subsequent encounter    Visit # / Visits Authorized:  1 / 1  Insurance Authorization Period: 3/27/2025 to 12/31/2025  Date of Evaluation: 4/4/2025  Plan of Care Certification: 4/4/2025 to 5/30/2025     Time In: 0705   Time Out: 0800  Total Time: 55   Total Billable Time: 55    Intake Outcome Measure for FOTO Survey    Therapist reviewed FOTO scores for Zelda Acevedo on 4/4/2025.   FOTO report - see Media section or FOTO account episode details.     Intake Score: 49 (69 goal)%         Subjective   History of Present Illness  Zelda is a 49 y.o. female who reports to physical therapy with a chief concern of s/p ORIF of proximal humerus fracture.                 Dominant Hand: Right  History of Present Condition/Illness: Reports she was walking her dog outside when it was wet and she slid. Reports the leashes were in front of her and she recall going outward but not sure. Reports she gets pain this week. MD told her to stop wearing the sling/brace. She is currently working and works at a computer. Reports she find the arm gravitates inward which starts to bother her by the end of the day. Reports she has been using heating pad and neck massages to help with this at the end of the day. Reports some numbness and  tingling in the upper arm intermittently; she notices it more when it is relaxed. Reports she continues to sleep in recliner to avoid rolling on her slide. Reports pain when she lays on the R side due to the L arm falling forward.     Activities of Daily Living  Social history was obtained from Patient.    General Prior Level of Function Comments: independent  General Current Level of Function Comments: independent, she is not currently lifting objects, reports intermittent assistance with dressing shirt and doing her hair, light household chores, dishes, sweeping, not currently driving  Patient Responsibilities: Driving, Financial management, Health management, Home management, Laundry, Meal prep, Personal ADL, Shopping, Yard work    Previously independent with activities of daily living? Yes     Currently independent with activities of daily living? Yes          Previously independent with instrumental activities of daily living? Yes     Currently independent with instrumental activities of daily living? No              Pain     Patient reports a current pain level of 0/10. Pain at best is reported as 0/10. Pain at worst is reported as 4/10.   Location: L shoulder, anterior superior region  Clinical Progression (since onset): Stable  Pain Qualities: Aching  Pain-Relieving Factors: Massage, Medications - over-the-counter, Other (Comment), Heat  Other Pain-Relieving Factors: tylenol as needed  Pain-Aggravating Factors: Other (Comment)  Other Pain-Aggravating Factors: at the end of the day of work, no pain unless she does something she isnt supposed to be doing         Review of Systems  Patient denies: Night Pain        Treatment History  Treatments  Previously Received Treatments: No  Currently Receiving Treatments: No    Living Arrangements  Living Situation  Housing: Home independently  Living Arrangements: Family members, Children, Spouse/significant other    Two story home       Employment  Patient reports:  Does the patient's condition impact their ability to work?  Employment Status: Employed full-time   Job title: supervisor, [employer] work- insurance       Past Medical History/Physical Systems Review:   Zelda Acevedo  has a past medical history of Anxiety disorder, unspecified, Hypertension, and Mixed hyperlipidemia.    Zelda Acevedo  has a past surgical history that includes  section;  section; Tonsillectomy; and Open reduction and internal fixation (ORIF) of fracture of proximal humerus (Left, 2025).    Zelda has a current medication list which includes the following prescription(s): acetaminophen, calcium-vitamin d3-vitamin k, cetirizine, escitalopram oxalate, hydrocodone-acetaminophen, lisinopril, meloxicam, ondansetron, oxycodone-acetaminophen, oxycodone-acetaminophen, rosuvastatin, soy isofla/blk cohosh/mag bark, triamcinolone acetonide 0.1%, UNABLE TO FIND, UNABLE TO FIND, and valacyclovir.    Review of patient's allergies indicates:  No Known Allergies     Objective   Posture                 Rounded shoulders, forward head posture    Shoulder Observations     Good pink scar healing, educated on cross friction massage, incision fully closed no scabbing present        Upper Extremity Sensation  General Upper Extremity Sensation  Impaired: Left  Right Upper Extremity Sensation  Intact: Light Touch       Left Upper Extremity Sensation  Diminished: Light Touch  Left Upper Extremity Sensation Light Touch Comment: diminished at C4 dermatome             Subcranial Range of Motion   Active Restricted? Passive Restricted? Pain   Flexion         Protraction         Retraction           Seated Cervical range of motion: Flexion: None ; Extension: Moderate; Right SB: Major; Left SB: Major; Left Rotation: Major; Right rotation: Moderate. No pain just pulling in the L shoulder with cervical AROM. Repeated retractions 15 repetitions, no effect on range of motion. Repeated retraction extension 10  repetitions, improvement in range of motion .      Shoulder Range of Motion  Right Shoulder   Active (deg) Passive (deg) Pain   Flexion 180       Extension         Scaption         ABduction 180       ADduction         Horizontal ABduction         Horizontal ADduction         External Rotation (Shoulder ABducted 0 degrees)         External Rotation (Shoulder ABducted 45 degrees)         External Rotation (Shoulder ABducted 90 degrees) 100       Internal Rotation (Shoulder ABducted 0 degrees)         Internal Rotation (Shoulder ABducted 45 degrees)         Internal Rotation (Shoulder ABducted 90 degrees) 60         Left Shoulder   Active (deg) Passive (deg) Pain   Flexion 80 100 Yes   Extension         Scaption         ABduction 105 115 Yes   ADduction         Horizontal ABduction         Horizontal ADduction         External Rotation (Shoulder ABducted 0 degrees)         External Rotation (Shoulder ABducted 45 degrees) 10 30     External Rotation (Shoulder ABducted 90 degrees)         Internal Rotation (Shoulder ABducted 0 degrees)         Internal Rotation (Shoulder ABducted 45 degrees)         Internal Rotation (Shoulder ABducted 90 degrees)                       Shoulder Strength - Planes of Motion   Right Strength Right Pain Left Strength Left  Pain   Flexion 4   3-     Extension           ABduction 4   3-     ADduction           Horizontal ABduction           Horizontal ADduction           Internal Rotation 0° 4         Internal Rotation 90°           External Rotation 0° 4         External Rotation 90°               Elbow Strength   Right Strength Right Pain Left Strength Left  Pain   Flexion (C6) 4+   4+     Extension (C7)                             Treatment:  Therapeutic Exercise  TE 1: PROM flexion, abduction, external rotation 2 x 10  TE 2: AAROM flexion, extension, abduction, ER 10 reps  TE 3: thoracic extensions 20 reps  Balance/Neuromuscular Re-Education  NMR 1: Shoulder isometrics 10 x 3 sec  NMR 2:  scapular retractions 20 x 3 sec  NMR 3: repeated chin retractions 15 reps , slight improvement in extension ROM  NMR 4: repeated chin retraction extension 10 reps,    Time Entry(in minutes):  PT Evaluation (Low) Time Entry: 24  Neuromuscular Re-Education Time Entry: 10  Therapeutic Exercise Time Entry: 23    Assessment & Plan   Assessment  Zelda presents with a condition of Low complexity.   Presentation of Symptoms: Stable  Will Comorbidities Impact Care: No       Functional Limitations: Completing work/school activities, Disrupted sleep pattern, Driving, Functional mobility, Manipulating objects, Pain when reaching, Pain with ADLs/IADLs, Performing household chores, Range of motion, Reaching  Impairments: Impaired physical strength, Abnormal or restricted range of motion    Patient Goal for Therapy (PT): return to PLOF without limitations  Prognosis: Good  Assessment Details: Pt presents with s/p L ORIF of proximal humerus following a fall on 2/28/2025. She reports functional limitations of performing household chores, donning some shirts, fixing her hair, lifting, and reaching. Patient demonstrates decreased LUE P/AROM, strength of LUE, decreased cervical AROM, and poor posture affecting her ability to perform ADLs and IADLs without assistance. Pt would benefit from skilled PT to address these deficits to maximize functional mobility and return to PLOF.      Plan  From a physical therapy perspective, the patient would benefit from: Skilled Rehab Services    Planned therapy interventions include: Therapeutic exercise, Therapeutic activities, Neuromuscular re-education, Manual therapy, ADLs/IADLs, Aquatic therapy, and Gait training.            Visit Frequency: 2 times Per Week for 8 Weeks.       This plan was discussed with Patient.   Discussion participants: Agreed Upon Plan of Care             Patient's spiritual, cultural, and educational needs considered and patient agreeable to plan of care and goals.            Goals:   Active       Long Term Goals       Patient will increase LUE AROM to >/= 150 deg flexion, 150 deg abduction, and 75 deg ER to improve reaching        Start:  04/04/25    Expected End:  05/30/25            Patient will demonstrate symmetry with apley IR and ER to improve ability to perform IADLS       Start:  04/04/25    Expected End:  05/30/25            Patient will report >/= 69% function on FOTO to improve return to PLOF        Start:  04/04/25    Expected End:  05/30/25            Patient will increase LUE strength to >/= 4/5 to improve lifting abilities        Start:  04/04/25    Expected End:  05/30/25            - Pt will demonstrate independence with the HEP at discharge.        Start:  04/04/25    Expected End:  05/30/25               Short Term Goal       Patient will increase LUE AROM to >/= 130 deg flexion, 130 deg abduction, and 50 deg ER to improve reaching        Start:  04/04/25    Expected End:  05/02/25            Patient will report </= 2/10 for at worst pain to improve quality of life       Start:  04/04/25    Expected End:  05/02/25            Patient will reach thoracic spine with apley flexion ER to improve ability to fix her hair       Start:  04/04/25    Expected End:  05/02/25            Patient will reach lumbar spine with apley extension/IR to improve ability to doff bra       Start:  04/04/25    Expected End:  05/02/25            Patient will report >/= 59% function on FOTO to improve return to PLOF        Start:  04/04/25    Expected End:  05/02/25                Nikole Proctor, PT

## 2025-04-08 ENCOUNTER — CLINICAL SUPPORT (OUTPATIENT)
Dept: REHABILITATION | Facility: HOSPITAL | Age: 50
End: 2025-04-08
Payer: COMMERCIAL

## 2025-04-08 ENCOUNTER — DOCUMENTATION ONLY (OUTPATIENT)
Dept: REHABILITATION | Facility: HOSPITAL | Age: 50
End: 2025-04-08

## 2025-04-08 DIAGNOSIS — R29.898 DECREASED STRENGTH OF UPPER EXTREMITY: ICD-10-CM

## 2025-04-08 DIAGNOSIS — M25.612 DECREASED RANGE OF MOTION OF LEFT SHOULDER: Primary | ICD-10-CM

## 2025-04-08 DIAGNOSIS — R29.3 POOR POSTURE: ICD-10-CM

## 2025-04-08 PROCEDURE — 97110 THERAPEUTIC EXERCISES: CPT | Mod: KX,PN,CQ

## 2025-04-08 PROCEDURE — 97112 NEUROMUSCULAR REEDUCATION: CPT | Mod: KX,PN,CQ

## 2025-04-08 PROCEDURE — 97140 MANUAL THERAPY 1/> REGIONS: CPT | Mod: KX,PN,CQ

## 2025-04-08 NOTE — PROGRESS NOTES
Outpatient Rehab    Physical Therapy Visit    Patient Name: Zelda Acevedo  MRN: [id number]  YOB: 1975  Encounter Date: 4/8/2025    Therapy Diagnosis:   Encounter Diagnoses   Name Primary?    Decreased range of motion of left shoulder Yes    Poor posture     Decreased strength of upper extremity      Physician: Kenji Maradiaga,*    Physician Orders: Eval and Treat  Medical Diagnosis: Left shoulder pain, unspecified chronicity  Other closed displaced fracture of proximal end of left humerus with routine healing, subsequent encounter    Visit # / Visits Authorized:  1 / 15  Insurance Authorization Period: 4/4/2025 to 12/31/2025       PT/PTA: PTA   Number of PTA visits since last PT visit:1    Time In: 0659   Time Out: 0756  Total Time: 57   Total Billable Time: 57    FOTO:  Intake Score:  %  Survey Score 1:  %  Survey Score 2:  %         Subjective   not a constant pain, depends on how she moves it.  Pain reported as 1/10.      Objective     s/p ORIF of proximal humerus fracture.  Therapeutic Exercise to improve strength and Range of Motion x  23  minutes:    Overhead Pulleys flexion, scaption x 3 minutes each    Supine AAROM flexion, extension, ER with dowel x 20 each    Thoracic extensions 20 reps    Balance/Neuromuscular Re-Education to improve posture, proprioception, coordination x  24 minutes:    Scapular retractions 20 x 3 sec  Chin retractions x 20  Chin retraction extension x 20     Open books L side x 10  Table walk backs for shoulder flexion x 15    Shoulder isometrics: Flexion, Extension, External Rotation, Internal Rotation x 10 each    Manual therapy x 10 minutes:  Passive Range of Motion flexion, External Rotation  Contract/relax External Rotation, flexion  Grade II inferior, posterior GH mobs  Oscillations     Time Entry(in minutes):  Manual Therapy Time Entry: 10  Neuromuscular Re-Education Time Entry: 24  Therapeutic Exercise Time Entry: 23      Assessment & Plan   Assessment:  Zelda presents with decreased L shoulder ROM and strength.  Guarded movements of her trunk and cervical spine.  Overall good tolerance for PRE's, pain at end range motions.       Patient will continue to benefit from skilled outpatient physical therapy to address the deficits listed in the problem list box on initial evaluation, provide pt/family education and to maximize pt's level of independence in the home and community environment.     Patient's spiritual, cultural, and educational needs considered and patient agreeable to plan of care and goals.           Plan: cont per poc, posture, ROM, strength    Goals:   Active       Long Term Goals       Patient will increase LUE AROM to >/= 150 deg flexion, 150 deg abduction, and 75 deg ER to improve reaching  (Progressing)       Start:  04/04/25    Expected End:  05/30/25            Patient will demonstrate symmetry with apley IR and ER to improve ability to perform IADLS (Progressing)       Start:  04/04/25    Expected End:  05/30/25            Patient will report >/= 69% function on FOTO to improve return to PLOF  (Progressing)       Start:  04/04/25    Expected End:  05/30/25            Patient will increase LUE strength to >/= 4/5 to improve lifting abilities  (Progressing)       Start:  04/04/25    Expected End:  05/30/25            - Pt will demonstrate independence with the HEP at discharge.  (Progressing)       Start:  04/04/25    Expected End:  05/30/25               Short Term Goal       Patient will increase LUE AROM to >/= 130 deg flexion, 130 deg abduction, and 50 deg ER to improve reaching  (Progressing)       Start:  04/04/25    Expected End:  05/02/25            Patient will report </= 2/10 for at worst pain to improve quality of life (Progressing)       Start:  04/04/25    Expected End:  05/02/25            Patient will reach thoracic spine with apley flexion ER to improve ability to fix her hair (Progressing)       Start:  04/04/25    Expected End:   05/02/25            Patient will reach lumbar spine with apley extension/IR to improve ability to doff bra (Progressing)       Start:  04/04/25    Expected End:  05/02/25            Patient will report >/= 59% function on FOTO to improve return to PLOF  (Progressing)       Start:  04/04/25    Expected End:  05/02/25                Kaye Araujo, PTA

## 2025-04-08 NOTE — PROGRESS NOTES
PT/PTA met face to face to discuss pt's treatment plan and progress towards established goals. Pt will be seen by a physical therapist minimally every 6th visit or every 30 days.    Kaye Araujo PTA

## 2025-04-10 ENCOUNTER — CLINICAL SUPPORT (OUTPATIENT)
Dept: REHABILITATION | Facility: HOSPITAL | Age: 50
End: 2025-04-10
Payer: COMMERCIAL

## 2025-04-10 DIAGNOSIS — R29.898 DECREASED STRENGTH OF UPPER EXTREMITY: ICD-10-CM

## 2025-04-10 DIAGNOSIS — M25.612 DECREASED RANGE OF MOTION OF LEFT SHOULDER: Primary | ICD-10-CM

## 2025-04-10 DIAGNOSIS — R29.3 POOR POSTURE: ICD-10-CM

## 2025-04-10 PROCEDURE — 97112 NEUROMUSCULAR REEDUCATION: CPT | Mod: PN

## 2025-04-10 PROCEDURE — 97110 THERAPEUTIC EXERCISES: CPT | Mod: PN

## 2025-04-11 NOTE — PROGRESS NOTES
Physical Therapy Visit    Patient Name: Zelda Acevedo  MRN: [id number]  YOB: 1975  Encounter Date: 4/10/2025    Therapy Diagnosis:   Encounter Diagnoses   Name Primary?    Decreased range of motion of left shoulder Yes    Poor posture     Decreased strength of upper extremity      Physician: Kenji Maradiaga,*    Physician Orders: Eval and Treat  Medical Diagnosis: Left shoulder pain, unspecified chronicity  Other closed displaced fracture of proximal end of left humerus with routine healing, subsequent encounter    Visit # / Visits Authorized:  2 / 15  Insurance Authorization Period: 4/4/2025 to 12/31/2025    Date of Evaluation: 4/4/2025  Plan of Care Certification: 4/4/2025 to 5/30/2025      PT/PTA: PT   Number of PTA visits since last PT visit:0  Time In: 1355   Time Out: 1450  Total Time: 55   Total Billable Time: 55    FOTO:  Intake Score: 49%  Survey Score 1:  %  Survey Score 2:  %         Subjective   Reports soreness but no other complaints. She was able to drive here today. She has also been able to do her hair and wash her hair..  Pain reported as 0/10. L shoulder    Objective            Treatment:  Therapeutic Exercise  TE 1: PROM flexion, abduction, external rotation x 15 min  TE 2: AAROM flexion, extension, ABD, and ER with dowel x 15 reps  TE 3: Shoulder flexion walk out 10 reps  TE 4: SL active shoulder flexion short arc 20 reps  TE 5: SL active shoulder abduction 15 reps  TE 6: SL ER AROM  TE 7: pulley flexion and abduction x 2 min each direction  Manual Therapy  MT 1: grade 1-2 joint mob L GH joint  Balance/Neuromuscular Re-Education  NMR 1: Shoulder isometrics 10 x 3 sec  NMR 3: Prone rows 2 x 15 reps  NMR 4: Seated thoracic extension 15 reps    Time Entry(in minutes):  Manual Therapy Time Entry: 3  Neuromuscular Re-Education Time Entry: 10  Therapeutic Exercise Time Entry: 42    Assessment & Plan   Assessment: She tolerated tx session well. She arrives without pain only soreness.  Tx session focused on ROM as well as addition of AROM. She tolerated gravity eliminated AROM well without complaints. She was able to complete most HE without cuing indicating compliance with HEP.  Evaluation/Treatment Tolerance: Patient tolerated treatment well    Patient will continue to benefit from skilled outpatient physical therapy to address the deficits listed in the problem list box on initial evaluation, provide pt/family education and to maximize pt's level of independence in the home and community environment.     Patient's spiritual, cultural, and educational needs considered and patient agreeable to plan of care and goals.           Plan: Continue POC as indicated. Progress as tolerated.    Goals:   Active       Long Term Goals       Patient will increase LUE AROM to >/= 150 deg flexion, 150 deg abduction, and 75 deg ER to improve reaching  (Progressing)       Start:  04/04/25    Expected End:  05/30/25            Patient will demonstrate symmetry with apley IR and ER to improve ability to perform IADLS (Progressing)       Start:  04/04/25    Expected End:  05/30/25            Patient will report >/= 69% function on FOTO to improve return to PLOF  (Progressing)       Start:  04/04/25    Expected End:  05/30/25            Patient will increase LUE strength to >/= 4/5 to improve lifting abilities  (Progressing)       Start:  04/04/25    Expected End:  05/30/25            - Pt will demonstrate independence with the HEP at discharge.  (Progressing)       Start:  04/04/25    Expected End:  05/30/25               Short Term Goal       Patient will increase LUE AROM to >/= 130 deg flexion, 130 deg abduction, and 50 deg ER to improve reaching  (Progressing)       Start:  04/04/25    Expected End:  05/02/25            Patient will report </= 2/10 for at worst pain to improve quality of life (Progressing)       Start:  04/04/25    Expected End:  05/02/25            Patient will reach thoracic spine with apley  flexion ER to improve ability to fix her hair (Progressing)       Start:  04/04/25    Expected End:  05/02/25            Patient will reach lumbar spine with apley extension/IR to improve ability to doff bra (Progressing)       Start:  04/04/25    Expected End:  05/02/25            Patient will report >/= 59% function on FOTO to improve return to PLOF  (Progressing)       Start:  04/04/25    Expected End:  05/02/25                Nikole Proctor, PT

## 2025-04-15 ENCOUNTER — CLINICAL SUPPORT (OUTPATIENT)
Dept: REHABILITATION | Facility: HOSPITAL | Age: 50
End: 2025-04-15
Payer: COMMERCIAL

## 2025-04-15 DIAGNOSIS — R29.3 POOR POSTURE: ICD-10-CM

## 2025-04-15 DIAGNOSIS — M25.612 DECREASED RANGE OF MOTION OF LEFT SHOULDER: Primary | ICD-10-CM

## 2025-04-15 DIAGNOSIS — R29.898 DECREASED STRENGTH OF UPPER EXTREMITY: ICD-10-CM

## 2025-04-15 PROCEDURE — 97110 THERAPEUTIC EXERCISES: CPT | Mod: PN,CQ

## 2025-04-15 PROCEDURE — 97112 NEUROMUSCULAR REEDUCATION: CPT | Mod: PN,CQ

## 2025-04-15 PROCEDURE — 97530 THERAPEUTIC ACTIVITIES: CPT | Mod: PN,CQ

## 2025-04-15 NOTE — PROGRESS NOTES
Outpatient Rehab    Physical Therapy Visit    Patient Name: Zelda Acevedo  MRN: [id number]  YOB: 1975  Encounter Date: 4/15/2025    Therapy Diagnosis:   Encounter Diagnoses   Name Primary?    Decreased range of motion of left shoulder Yes    Poor posture     Decreased strength of upper extremity      Physician: Kenji Maradiaga,*    Physician Orders: Eval and Treat  Medical Diagnosis: Left shoulder pain, unspecified chronicity  Other closed displaced fracture of proximal end of left humerus with routine healing, subsequent encounter    Visit # / Visits Authorized:  3 / 15  Insurance Authorization Period: 4/4/2025 to 12/31/2025       PT/PTA: PTA   Number of PTA visits since last PT visit:1    Time In: 0700   Time Out: 0753  Total Time: 53   Total Billable Time: 53    FOTO:  Intake Score:  %  Survey Score 1:  %  Survey Score 2:  %         Subjective   She was able to pull her hair up without assistance.  Pain reported as 0/10.      Objective   Therapeutic Exercise to improve strength, Range of Motion x  15  minutes:    OHP flexion x 3 minutes    SL active shoulder flexion short arc 20 reps  SL active shoulder abduction 20 reps  SL ER AROM x 20  Prone rows 2 x 10  Prone flexion x 20    NMRE to improve posture, proprioception, coordination x 28  minutes:    AAROM flexion, extension, ABD, and ER with dowel x 15 reps      Supine AAROM flexion, External Rotation with dowel x 20 each      Shoulder isometrics 10 x 3 sec    Seated thoracic extension 20 reps     Shoulder flexion walk out 10 reps     Manual Therapy x 10 minutes  PROM flexion, abduction, external rotation  Grade 1-2 joint mob L GH joint    Time Entry(in minutes):  Neuromuscular Re-Education Time Entry: 28  Therapeutic Activity Time Entry: 10  Therapeutic Exercise Time Entry: 15    Assessment & Plan   Assessment: Zelda remains with ROM limitations, but is improving.  Pain at end range passive motions, but overall she is making good  improvement.       Patient will continue to benefit from skilled outpatient physical therapy to address the deficits listed in the problem list box on initial evaluation, provide pt/family education and to maximize pt's level of independence in the home and community environment.     Patient's spiritual, cultural, and educational needs considered and patient agreeable to plan of care and goals.           Plan:      Goals:   Active       Long Term Goals       Patient will increase LUE AROM to >/= 150 deg flexion, 150 deg abduction, and 75 deg ER to improve reaching  (Progressing)       Start:  04/04/25    Expected End:  05/30/25            Patient will demonstrate symmetry with apley IR and ER to improve ability to perform IADLS (Progressing)       Start:  04/04/25    Expected End:  05/30/25            Patient will report >/= 69% function on FOTO to improve return to PLOF  (Progressing)       Start:  04/04/25    Expected End:  05/30/25            Patient will increase LUE strength to >/= 4/5 to improve lifting abilities  (Progressing)       Start:  04/04/25    Expected End:  05/30/25            - Pt will demonstrate independence with the HEP at discharge.  (Progressing)       Start:  04/04/25    Expected End:  05/30/25               Short Term Goal       Patient will increase LUE AROM to >/= 130 deg flexion, 130 deg abduction, and 50 deg ER to improve reaching  (Progressing)       Start:  04/04/25    Expected End:  05/02/25            Patient will report </= 2/10 for at worst pain to improve quality of life (Progressing)       Start:  04/04/25    Expected End:  05/02/25            Patient will reach thoracic spine with apley flexion ER to improve ability to fix her hair (Progressing)       Start:  04/04/25    Expected End:  05/02/25            Patient will reach lumbar spine with apley extension/IR to improve ability to doff bra (Progressing)       Start:  04/04/25    Expected End:  05/02/25            Patient will  report >/= 59% function on FOTO to improve return to PLOF  (Progressing)       Start:  04/04/25    Expected End:  05/02/25                Kaye Araujo, PTA

## 2025-04-17 ENCOUNTER — CLINICAL SUPPORT (OUTPATIENT)
Dept: REHABILITATION | Facility: HOSPITAL | Age: 50
End: 2025-04-17
Payer: COMMERCIAL

## 2025-04-17 DIAGNOSIS — M25.612 DECREASED RANGE OF MOTION OF LEFT SHOULDER: Primary | ICD-10-CM

## 2025-04-17 DIAGNOSIS — R29.898 DECREASED STRENGTH OF UPPER EXTREMITY: ICD-10-CM

## 2025-04-17 DIAGNOSIS — R29.3 POOR POSTURE: ICD-10-CM

## 2025-04-17 PROCEDURE — 97110 THERAPEUTIC EXERCISES: CPT | Mod: PN

## 2025-04-17 PROCEDURE — 97112 NEUROMUSCULAR REEDUCATION: CPT | Mod: PN

## 2025-04-18 NOTE — PROGRESS NOTES
Physical Therapy Visit    Patient Name: Zelda Acevedo  MRN: [id number]  YOB: 1975  Encounter Date: 4/17/2025    Therapy Diagnosis:   Encounter Diagnoses   Name Primary?    Decreased range of motion of left shoulder Yes    Poor posture     Decreased strength of upper extremity      Physician: Kenji Maradiaga,*    Physician Orders: Eval and Treat  Medical Diagnosis: Left shoulder pain, unspecified chronicity  Other closed displaced fracture of proximal end of left humerus with routine healing, subsequent encounter    Visit # / Visits Authorized:  4 / 15  Insurance Authorization Period: 4/4/2025 to 12/31/2025    Date of Evaluation: 4/4/2025  Plan of Care Certification: 4/4/2025 to 5/30/2025     PT/PTA: PT   Number of PTA visits since last PT visit:0  Time In: 1555   Time Out: 1658  Total Time: 63   Total Billable Time: 55    FOTO:  Intake Score: 49%  Survey Score 1:  %  Survey Score 2:  %         Subjective   Reports she had some pain near the L scapular region after last tx sesson. She had to take a muscle relaxer yesterday due to the pain..  Pain reported as 0/10. L shoulder    Objective            Treatment:  Therapeutic Exercise  TE 1: PROM flexion, abduction, external rotation x 15 min  TE 2: AAROM flexion, extension, ABD, and ER with dowel x 15 reps  TE 3: Shoulder flexion walk out 10 reps  TE 4: SL active shoulder flexion short arc 20 reps  TE 5: SL active shoulder abduction 20 reps  TE 6: SL ER AROM 3 x 10  TE 7: pulley flexion and abduction x 2 min each direction  TE 8: Ladder walk x 10 reps  TE 9: wall slides flexion 10 reps  TE 10: Seated AAROM ER 15 reps  Balance/Neuromuscular Re-Education  NMR 1: Shoulder isometrics flexion, abduction, extension 10 x 3 sec  NMR 2: scapular retractions 20 x 3 sec  NMR 3: Prone rows 2 x 15 reps 2lb DB  NMR 4: Seated thoracic extension 15 reps  NMR 5: isometric walk outs ER/IR  yellow TB 10 reps each side    Time Entry(in minutes):  Manual Therapy Time  Entry: 2  Neuromuscular Re-Education Time Entry: 23  Therapeutic Exercise Time Entry: 38    Assessment & Plan   Assessment: Patient arrives with reports of sorenees after last tx session requiring muscle realxer yesterday for the pain. She tolerated tx session well given soreness reports. She continues to demonstrate decreased L shoulder ROM in all direction mostly ER. She tolerated new exercises and progressions nwell.  Evaluation/Treatment Tolerance: Patient tolerated treatment well    Patient will continue to benefit from skilled outpatient physical therapy to address the deficits listed in the problem list box on initial evaluation, provide pt/family education and to maximize pt's level of independence in the home and community environment.     Patient's spiritual, cultural, and educational needs considered and patient agreeable to plan of care and goals.           Plan: Continue POC as indicated. Progress as tolerated.    Goals:   Active       Long Term Goals       Patient will increase LUE AROM to >/= 150 deg flexion, 150 deg abduction, and 75 deg ER to improve reaching  (Progressing)       Start:  04/04/25    Expected End:  05/30/25            Patient will demonstrate symmetry with apley IR and ER to improve ability to perform IADLS (Progressing)       Start:  04/04/25    Expected End:  05/30/25            Patient will report >/= 69% function on FOTO to improve return to PLOF  (Progressing)       Start:  04/04/25    Expected End:  05/30/25            Patient will increase LUE strength to >/= 4/5 to improve lifting abilities  (Progressing)       Start:  04/04/25    Expected End:  05/30/25            - Pt will demonstrate independence with the HEP at discharge.  (Progressing)       Start:  04/04/25    Expected End:  05/30/25               Short Term Goal       Patient will increase LUE AROM to >/= 130 deg flexion, 130 deg abduction, and 50 deg ER to improve reaching  (Progressing)       Start:  04/04/25     Expected End:  05/02/25            Patient will report </= 2/10 for at worst pain to improve quality of life (Progressing)       Start:  04/04/25    Expected End:  05/02/25            Patient will reach thoracic spine with apley flexion ER to improve ability to fix her hair (Progressing)       Start:  04/04/25    Expected End:  05/02/25            Patient will reach lumbar spine with apley extension/IR to improve ability to doff bra (Progressing)       Start:  04/04/25    Expected End:  05/02/25            Patient will report >/= 59% function on FOTO to improve return to PLOF  (Progressing)       Start:  04/04/25    Expected End:  05/02/25                Nikole Proctor, PT

## 2025-04-22 ENCOUNTER — CLINICAL SUPPORT (OUTPATIENT)
Dept: REHABILITATION | Facility: HOSPITAL | Age: 50
End: 2025-04-22
Payer: COMMERCIAL

## 2025-04-22 ENCOUNTER — PATIENT OUTREACH (OUTPATIENT)
Dept: ADMINISTRATIVE | Facility: HOSPITAL | Age: 50
End: 2025-04-22
Payer: COMMERCIAL

## 2025-04-22 DIAGNOSIS — R29.898 DECREASED STRENGTH OF UPPER EXTREMITY: ICD-10-CM

## 2025-04-22 DIAGNOSIS — M25.612 DECREASED RANGE OF MOTION OF LEFT SHOULDER: Primary | ICD-10-CM

## 2025-04-22 DIAGNOSIS — M25.512 LEFT SHOULDER PAIN, UNSPECIFIED CHRONICITY: Primary | ICD-10-CM

## 2025-04-22 DIAGNOSIS — R29.3 POOR POSTURE: ICD-10-CM

## 2025-04-22 PROCEDURE — 97110 THERAPEUTIC EXERCISES: CPT | Mod: PN,CQ

## 2025-04-22 PROCEDURE — 97112 NEUROMUSCULAR REEDUCATION: CPT | Mod: PN,CQ

## 2025-04-22 NOTE — PROGRESS NOTES
Outpatient Rehab    Physical Therapy Visit    Patient Name: Zelda Acevedo  MRN: [id number]  YOB: 1975  Encounter Date: 4/22/2025    Therapy Diagnosis:   Encounter Diagnoses   Name Primary?    Decreased range of motion of left shoulder Yes    Poor posture     Decreased strength of upper extremity      Physician: Kenji Maradiaga,*    Physician Orders: Eval and Treat  Medical Diagnosis: Left shoulder pain, unspecified chronicity  Other closed displaced fracture of proximal end of left humerus with routine healing, subsequent encounter    Visit # / Visits Authorized:  5 / 15  Insurance Authorization Period: 4/4/2025 to 12/31/2025    PT/PTA: PTA   Number of PTA visits since last PT visit:1    Time In: 0701   Time Out: 0750  Total Time: 49   Total Billable Time: 49    FOTO:  Intake Score:  %  Survey Score 1:  %  Survey Score 2:  %      Subjective   No complaints today.  Pain reported as 0/10.      Objective      Therapeutic Exercise to improve strength and Range of Motion x 24 minutes:  Rehab tech assisted with treatment  TE 1: PROM flexion, abduction, external rotation x 15 min    TE 2: AAROM flexion, extension, ABD, and ER with dowel x 15     TE 3: Shoulder flexion walk out 10 reps  TE 4: SL active shoulder flexion short arc 20 reps  TE 5: SL active shoulder abduction 20 reps  TE 6: SL ER AROM 3 x 10    TE 7: Overhead Pulleys: flexion and abduction x 3 min each direction    TE 8: Ladder walk x 10 reps  TE 9: wall slides flexion 10 reps    TE 10: Seated AAROM ER 15 reps    Neuromuscular Re-Education to improve posture, coordination, proprioception x 25 minutes:  NMR 1: Shoulder isometrics flexion, abduction, extension 10 x 3 sec  NMR 5: isometric walk outs ER/IR  yellow TB 10 reps each side    NMR 2: scapular retractions 20 x 3 sec  NMR 4: Seated thoracic extension 15 reps    NMR 3: Prone rows 2 x 15 reps 2lb DB        Treatment:       Time Entry(in minutes):  Neuromuscular Re-Education Time Entry:  25  Therapeutic Exercise Time Entry: 24    Assessment & Plan   Assessment: Zelda continues with AROM L shoulder limitations, but is improving slowly.  She tolerates PRE's with decreased pain and discomfort.  She continues with guarded cervical ROM, which affects her shoulder and UE motions.       Patient will continue to benefit from skilled outpatient physical therapy to address the deficits listed in the problem list box on initial evaluation, provide pt/family education and to maximize pt's level of independence in the home and community environment.     Patient's spiritual, cultural, and educational needs considered and patient agreeable to plan of care and goals.           Plan: Continue POC as indicated. Progress as tolerated.    Goals:   Active       Long Term Goals       Patient will increase LUE AROM to >/= 150 deg flexion, 150 deg abduction, and 75 deg ER to improve reaching  (Progressing)       Start:  04/04/25    Expected End:  05/30/25            Patient will demonstrate symmetry with apley IR and ER to improve ability to perform IADLS (Progressing)       Start:  04/04/25    Expected End:  05/30/25            Patient will report >/= 69% function on FOTO to improve return to PLOF  (Progressing)       Start:  04/04/25    Expected End:  05/30/25            Patient will increase LUE strength to >/= 4/5 to improve lifting abilities  (Progressing)       Start:  04/04/25    Expected End:  05/30/25            - Pt will demonstrate independence with the HEP at discharge.  (Progressing)       Start:  04/04/25    Expected End:  05/30/25               Short Term Goal       Patient will increase LUE AROM to >/= 130 deg flexion, 130 deg abduction, and 50 deg ER to improve reaching  (Progressing)       Start:  04/04/25    Expected End:  05/02/25            Patient will report </= 2/10 for at worst pain to improve quality of life (Progressing)       Start:  04/04/25    Expected End:  05/02/25            Patient will  reach thoracic spine with apley flexion ER to improve ability to fix her hair (Progressing)       Start:  04/04/25    Expected End:  05/02/25            Patient will reach lumbar spine with apley extension/IR to improve ability to doff bra (Progressing)       Start:  04/04/25    Expected End:  05/02/25            Patient will report >/= 59% function on FOTO to improve return to PLOF  (Progressing)       Start:  04/04/25    Expected End:  05/02/25                Kaye Araujo, PTA

## 2025-04-24 ENCOUNTER — CLINICAL SUPPORT (OUTPATIENT)
Dept: REHABILITATION | Facility: HOSPITAL | Age: 50
End: 2025-04-24
Payer: COMMERCIAL

## 2025-04-24 DIAGNOSIS — M25.612 DECREASED RANGE OF MOTION OF LEFT SHOULDER: Primary | ICD-10-CM

## 2025-04-24 DIAGNOSIS — R29.898 DECREASED STRENGTH OF UPPER EXTREMITY: ICD-10-CM

## 2025-04-24 DIAGNOSIS — R29.3 POOR POSTURE: ICD-10-CM

## 2025-04-24 PROCEDURE — 97112 NEUROMUSCULAR REEDUCATION: CPT | Mod: PN

## 2025-04-24 PROCEDURE — 97110 THERAPEUTIC EXERCISES: CPT | Mod: PN

## 2025-04-24 NOTE — PROGRESS NOTES
Physical Therapy Visit    Patient Name: Zelda Acevedo  MRN: [id number]  YOB: 1975  Encounter Date: 4/24/2025    Therapy Diagnosis:   Encounter Diagnoses   Name Primary?    Decreased range of motion of left shoulder Yes    Poor posture     Decreased strength of upper extremity      Physician: Kenji Maradiaga,*    Physician Orders: Eval and Treat  Medical Diagnosis: Left shoulder pain, unspecified chronicity  Other closed displaced fracture of proximal end of left humerus with routine healing, subsequent encounter    Visit # / Visits Authorized:  6 / 15  Insurance Authorization Period: 4/4/2025 to 12/31/2025    Date of Evaluation: 4/4/2025  Plan of Care Certification: 4/4/2025 to 5/30/2025     PT/PTA: PT   Number of PTA visits since last PT visit:0  Time In: 1600   Time Out: 1655  Total Time: 55   Total Billable Time: 55    FOTO:  Intake Score: 49%  Survey Score 1:  %  Survey Score 2:  %         Subjective   No new complaints. She is compliant with HEP..  Pain reported as 0/10. L shoulder    Objective      Shoulder Range of Motion  Left Shoulder   Active (deg) Passive (deg) Pain   Flexion 140 150     Extension         Scaption         ABduction 120 140     ADduction         Horizontal ABduction         Horizontal ADduction         External Rotation (Shoulder ABducted 0 degrees)         External Rotation (Shoulder ABducted 45 degrees) 40 50     External Rotation (Shoulder ABducted 90 degrees)         Internal Rotation (Shoulder ABducted 0 degrees)         Internal Rotation (Shoulder ABducted 45 degrees)         Internal Rotation (Shoulder ABducted 90 degrees) 60                        Treatment:  Therapeutic Exercise  TE 1: PROM flexion, abduction, external rotation  TE 2: AAROM flexion, ABD, and ER with dowel x 15 reps  TE 4: standing shoulder abduction 15 reps AROM  TE 5: standing shoulder flexion AROM 15 reps  TE 6: SL ER AROM 2 x 10  TE 7: pulley flexion and abduction x 2 min each direction  TE  8: Ladder walk x 10 reps  TE 9: wall slides flexion 10 reps  Balance/Neuromuscular Re-Education  NMR 4: resisted ER/IR red TB 2 x 10  NMR 5: isometric walk outs ER/IR  yellow TB 10 reps each side    Time Entry(in minutes):  Neuromuscular Re-Education Time Entry: 10  Therapeutic Exercise Time Entry: 45    Assessment & Plan   Assessment: She arrives without pain. She demonstrates significant improvement in A/PROM since evaluation. Added resisted Er/IR today with theraband with good tolerance and form. She is progresssing well.  Evaluation/Treatment Tolerance: Patient tolerated treatment well    Patient will continue to benefit from skilled outpatient physical therapy to address the deficits listed in the problem list box on initial evaluation, provide pt/family education and to maximize pt's level of independence in the home and community environment.     Patient's spiritual, cultural, and educational needs considered and patient agreeable to plan of care and goals.           Plan: Continue POC as indicated. Progress as tolerated.    Goals:   Active       Long Term Goals       Patient will increase LUE AROM to >/= 150 deg flexion, 150 deg abduction, and 75 deg ER to improve reaching  (Progressing)       Start:  04/04/25    Expected End:  05/30/25            Patient will demonstrate symmetry with apley IR and ER to improve ability to perform IADLS (Progressing)       Start:  04/04/25    Expected End:  05/30/25            Patient will report >/= 69% function on FOTO to improve return to PLOF  (Progressing)       Start:  04/04/25    Expected End:  05/30/25            Patient will increase LUE strength to >/= 4/5 to improve lifting abilities  (Progressing)       Start:  04/04/25    Expected End:  05/30/25            - Pt will demonstrate independence with the HEP at discharge.  (Progressing)       Start:  04/04/25    Expected End:  05/30/25               Short Term Goal       Patient will increase LUE AROM to >/= 130 deg  flexion, 130 deg abduction, and 50 deg ER to improve reaching  (Progressing)       Start:  04/04/25    Expected End:  05/02/25            Patient will report </= 2/10 for at worst pain to improve quality of life (Progressing)       Start:  04/04/25    Expected End:  05/02/25            Patient will reach thoracic spine with apley flexion ER to improve ability to fix her hair (Progressing)       Start:  04/04/25    Expected End:  05/02/25            Patient will reach lumbar spine with apley extension/IR to improve ability to doff bra (Progressing)       Start:  04/04/25    Expected End:  05/02/25            Patient will report >/= 59% function on FOTO to improve return to PLOF  (Progressing)       Start:  04/04/25    Expected End:  05/02/25                Nikole Proctor, PT

## 2025-04-29 ENCOUNTER — PATIENT OUTREACH (OUTPATIENT)
Dept: ADMINISTRATIVE | Facility: HOSPITAL | Age: 50
End: 2025-04-29
Payer: COMMERCIAL

## 2025-04-29 ENCOUNTER — CLINICAL SUPPORT (OUTPATIENT)
Dept: REHABILITATION | Facility: HOSPITAL | Age: 50
End: 2025-04-29
Payer: COMMERCIAL

## 2025-04-29 DIAGNOSIS — M25.612 DECREASED RANGE OF MOTION OF LEFT SHOULDER: Primary | ICD-10-CM

## 2025-04-29 DIAGNOSIS — R29.898 DECREASED STRENGTH OF UPPER EXTREMITY: ICD-10-CM

## 2025-04-29 DIAGNOSIS — R29.3 POOR POSTURE: ICD-10-CM

## 2025-04-29 PROCEDURE — 97110 THERAPEUTIC EXERCISES: CPT | Mod: PN,CQ

## 2025-04-29 PROCEDURE — 97112 NEUROMUSCULAR REEDUCATION: CPT | Mod: PN,CQ

## 2025-04-29 NOTE — PROGRESS NOTES
Outpatient Rehab    Physical Therapy Visit    Patient Name: Zelda Acevedo  MRN: [id number]  YOB: 1975  Encounter Date: 4/29/2025    Therapy Diagnosis:   Encounter Diagnoses   Name Primary?    Decreased range of motion of left shoulder Yes    Poor posture     Decreased strength of upper extremity      Physician: Kenji Maradiaga,*    Physician Orders: Eval and Treat  Medical Diagnosis: Left shoulder pain, unspecified chronicity  Other closed displaced fracture of proximal end of left humerus with routine healing, subsequent encounter    Visit # / Visits Authorized:  7 / 15  Insurance Authorization Period: 4/4/2025 to 12/31/2025       PT/PTA: PTA   Number of PTA visits since last PT visit:1    Time In: 0656   Time Out: 0748  Total Time: 52   Total Billable Time:      FOTO:  Intake Score:  %  Survey Score 1:  %  Survey Score 2:  %         Subjective   She feels it more in the collarbone today.  Pain reported as 0/10.      Objective     Therapeutic Exercise to improve strength and Range of Motion x 28  minutes:    UBE 3/3    AAROM flexion, ABD, and ER with dowel x 15 each  Standing shoulder abduction 15 reps AROM  Standing shoulder flexion AROM 15 reps    Overhead Pulleys: flexion and abduction x 2 min each direction    Ladder walk x 10 reps  NP  Wall slides flexion 10 reps    Balance/Neuromuscular Re-Education to improve posture, coordination, proprioception x  24 minutes:    Internal Rotation with towel behind back x 20    Isometric walk outs ER/IR  RED TB 15 reps each side    SL ER AROM 2 x 10  Sidelying shoulder abd x 20  Supine butterflies, hands behind head  20        PROM flexion, abduction, external rotation    Time Entry(in minutes):  Neuromuscular Re-Education Time Entry: 24  Therapeutic Exercise Time Entry: 28    Assessment & Plan   Assessment: Zelda demonstrates improvement in L shoulder ROM, but remains limited.  Improvement with IR behind back with continued reps.       Patient will  continue to benefit from skilled outpatient physical therapy to address the deficits listed in the problem list box on initial evaluation, provide pt/family education and to maximize pt's level of independence in the home and community environment.     Patient's spiritual, cultural, and educational needs considered and patient agreeable to plan of care and goals.           Plan: Continue POC as indicated. Progress as tolerated.    Goals:   Active       Long Term Goals       Patient will increase LUE AROM to >/= 150 deg flexion, 150 deg abduction, and 75 deg ER to improve reaching  (Progressing)       Start:  04/04/25    Expected End:  05/30/25            Patient will demonstrate symmetry with apley IR and ER to improve ability to perform IADLS (Progressing)       Start:  04/04/25    Expected End:  05/30/25            Patient will report >/= 69% function on FOTO to improve return to PLOF  (Progressing)       Start:  04/04/25    Expected End:  05/30/25            Patient will increase LUE strength to >/= 4/5 to improve lifting abilities  (Progressing)       Start:  04/04/25    Expected End:  05/30/25            - Pt will demonstrate independence with the HEP at discharge.  (Progressing)       Start:  04/04/25    Expected End:  05/30/25               Short Term Goal       Patient will increase LUE AROM to >/= 130 deg flexion, 130 deg abduction, and 50 deg ER to improve reaching  (Progressing)       Start:  04/04/25    Expected End:  05/02/25            Patient will report </= 2/10 for at worst pain to improve quality of life (Progressing)       Start:  04/04/25    Expected End:  05/02/25            Patient will reach thoracic spine with apley flexion ER to improve ability to fix her hair (Progressing)       Start:  04/04/25    Expected End:  05/02/25            Patient will reach lumbar spine with apley extension/IR to improve ability to doff bra (Progressing)       Start:  04/04/25    Expected End:  05/02/25             Patient will report >/= 59% function on FOTO to improve return to PLOF  (Progressing)       Start:  04/04/25    Expected End:  05/02/25                Kaye Araujo, PTA

## 2025-05-01 ENCOUNTER — OFFICE VISIT (OUTPATIENT)
Dept: ORTHOPEDICS | Facility: CLINIC | Age: 50
End: 2025-05-01
Payer: COMMERCIAL

## 2025-05-01 ENCOUNTER — CLINICAL SUPPORT (OUTPATIENT)
Dept: REHABILITATION | Facility: HOSPITAL | Age: 50
End: 2025-05-01
Payer: COMMERCIAL

## 2025-05-01 ENCOUNTER — HOSPITAL ENCOUNTER (OUTPATIENT)
Dept: RADIOLOGY | Facility: HOSPITAL | Age: 50
Discharge: HOME | End: 2025-05-01
Attending: ORTHOPAEDIC SURGERY
Payer: COMMERCIAL

## 2025-05-01 VITALS — BODY MASS INDEX: 35.5 KG/M2 | HEIGHT: 61 IN | WEIGHT: 188.06 LBS | RESPIRATION RATE: 16 BRPM

## 2025-05-01 DIAGNOSIS — M25.512 LEFT SHOULDER PAIN, UNSPECIFIED CHRONICITY: ICD-10-CM

## 2025-05-01 DIAGNOSIS — M25.612 DECREASED RANGE OF MOTION OF LEFT SHOULDER: Primary | ICD-10-CM

## 2025-05-01 DIAGNOSIS — R29.3 POOR POSTURE: ICD-10-CM

## 2025-05-01 DIAGNOSIS — R29.898 DECREASED STRENGTH OF UPPER EXTREMITY: ICD-10-CM

## 2025-05-01 DIAGNOSIS — S42.292D OTHER CLOSED DISPLACED FRACTURE OF PROXIMAL END OF LEFT HUMERUS WITH ROUTINE HEALING, SUBSEQUENT ENCOUNTER: Primary | ICD-10-CM

## 2025-05-01 PROCEDURE — 97110 THERAPEUTIC EXERCISES: CPT | Mod: PN,CQ

## 2025-05-01 PROCEDURE — 97112 NEUROMUSCULAR REEDUCATION: CPT | Mod: PN,CQ

## 2025-05-01 PROCEDURE — 73030 X-RAY EXAM OF SHOULDER: CPT | Mod: 26,LT,, | Performed by: RADIOLOGY

## 2025-05-01 PROCEDURE — 73030 X-RAY EXAM OF SHOULDER: CPT | Mod: TC,PO,LT

## 2025-05-01 PROCEDURE — 99999 PR PBB SHADOW E&M-EST. PATIENT-LVL III: CPT | Mod: PBBFAC,,, | Performed by: ORTHOPAEDIC SURGERY

## 2025-05-01 NOTE — PROGRESS NOTES
Outpatient Rehab    Physical Therapy Visit    Patient Name: Zelda Acevedo  MRN: [id number]  YOB: 1975  Encounter Date: 5/1/2025    Therapy Diagnosis:   Encounter Diagnoses   Name Primary?    Decreased range of motion of left shoulder Yes    Poor posture     Decreased strength of upper extremity      Physician: Kenji Maradiaga,*    Physician Orders: Eval and Treat  Medical Diagnosis: Left shoulder pain, unspecified chronicity  Other closed displaced fracture of proximal end of left humerus with routine healing, subsequent encounter    Visit # / Visits Authorized:  8 / 15  Insurance Authorization Period: 4/4/2025 to 12/31/2025       PT/PTA: PTA   Number of PTA visits since last PT visit:2    Time In: 0859   Time Out: 0950  Total Time: 51   Total Billable Time: 51    FOTO:  Intake Score:  %  Survey Score 1:  %  Survey Score 2:  %         Subjective   She is doing well toay.  Pain reported as 0/10.      Objective   Therapeutic Exercise to improve strength and Range of Motion x 25  minutes:     UBE 3/3     AAROM flexion, ABD, and ER with dowel x 15 each  Standing shoulder abduction 20 reps AROM  Standing shoulder flexion AROM 20 reps     Overhead Pulleys: flexion and abduction x 2 min each direction    NP  Overhead Pulleys AAROM flexion 3# on pulley, with LUE x 30    Ladder walk x 20 reps   np  Wall slides flexion 20 reps     Balance/Neuromuscular Re-Education to improve posture, coordination, proprioception x  26 minutes:     Internal Rotation with towel behind back x 20     Isometric walk outs ER/IR  RED TB 20 reps each side     Sidelying  ER AROM 2 x 10  Sidelying abduction 2 x 10  Sidelying shoulder abd x 20  Supine butterflies, hands behind head  20           PROM flexion, abduction, external rotation  np     Time Entry(in minutes):  Neuromuscular Re-Education Time Entry: 26  Therapeutic Exercise Time Entry: 25    Assessment & Plan   Assessment: Zelda continues to improve with L shoulder ROM  and strength.  Guarding with cervical ROM continues, which limits her shoulder motions.       Patient will continue to benefit from skilled outpatient physical therapy to address the deficits listed in the problem list box on initial evaluation, provide pt/family education and to maximize pt's level of independence in the home and community environment.     Patient's spiritual, cultural, and educational needs considered and patient agreeable to plan of care and goals.           Plan: Continue POC as indicated. Progress as tolerated.    Goals:   Active       Long Term Goals       Patient will increase LUE AROM to >/= 150 deg flexion, 150 deg abduction, and 75 deg ER to improve reaching  (Progressing)       Start:  04/04/25    Expected End:  05/30/25            Patient will demonstrate symmetry with apley IR and ER to improve ability to perform IADLS (Progressing)       Start:  04/04/25    Expected End:  05/30/25            Patient will report >/= 69% function on FOTO to improve return to PLOF  (Progressing)       Start:  04/04/25    Expected End:  05/30/25            Patient will increase LUE strength to >/= 4/5 to improve lifting abilities  (Progressing)       Start:  04/04/25    Expected End:  05/30/25            - Pt will demonstrate independence with the HEP at discharge.  (Progressing)       Start:  04/04/25    Expected End:  05/30/25               Short Term Goal       Patient will increase LUE AROM to >/= 130 deg flexion, 130 deg abduction, and 50 deg ER to improve reaching  (Progressing)       Start:  04/04/25    Expected End:  05/02/25            Patient will report </= 2/10 for at worst pain to improve quality of life (Progressing)       Start:  04/04/25    Expected End:  05/02/25            Patient will reach thoracic spine with apley flexion ER to improve ability to fix her hair (Progressing)       Start:  04/04/25    Expected End:  05/02/25            Patient will reach lumbar spine with apley  extension/IR to improve ability to doff bra (Progressing)       Start:  04/04/25    Expected End:  05/02/25            Patient will report >/= 59% function on FOTO to improve return to PLOF  (Progressing)       Start:  04/04/25    Expected End:  05/02/25                Kaye Araujo, PTA

## 2025-05-01 NOTE — PROGRESS NOTES
Past Medical History:   Diagnosis Date    Anxiety disorder, unspecified     Hypertension     Mixed hyperlipidemia        Past Surgical History:   Procedure Laterality Date     SECTION       SECTION      OPEN REDUCTION AND INTERNAL FIXATION (ORIF) OF FRACTURE OF PROXIMAL HUMERUS Left 2025    Procedure: ORIF, FRACTURE, HUMERUS, PROXIMAL;  Surgeon: Kenji Maradiaga MD;  Location: [facility];  Service: Orthopedics;  Laterality: Left;  [place]- prox hum plate  [place] NOTIFIED 25 ARK    TONSILLECTOMY         Current Outpatient Medications   Medication Sig    acetaminophen (TYLENOL) 500 MG tablet Take 2 tablets (1,000 mg total) by mouth every 8 (eight) hours as needed for Pain.    calcium-vitamin D3-vitamin K 500 mg-1,000 unit-40 mcg Chew Take by mouth once.    cetirizine (ZYRTEC) 10 MG tablet Take 10 mg by mouth once daily.    EScitalopram oxalate (LEXAPRO) 20 MG tablet Take 1 tablet (20 mg total) by mouth once daily.    HYDROcodone-acetaminophen (NORCO)  mg per tablet Take 1 tablet by mouth every 6 (six) hours as needed for Pain.    lisinopriL 10 MG tablet Take 1 tablet (10 mg total) by mouth once daily.    meloxicam (MOBIC) 15 MG tablet TAKE 1 TABLET BY MOUTH EVERY DAY (Patient not taking: Reported on 2025)    ondansetron (ZOFRAN) 4 MG tablet Take 1 tablet (4 mg total) by mouth every 6 (six) hours as needed for Nausea.    oxyCODONE-acetaminophen (PERCOCET) 5-325 mg per tablet Take 1 tablet by mouth every 6 (six) hours as needed for Pain.    oxyCODONE-acetaminophen (PERCOCET) 5-325 mg per tablet Take 1 tablet by mouth every 6 (six) hours as needed for Pain.    rosuvastatin (CRESTOR) 20 MG tablet Take 1 tablet (20 mg total) by mouth once daily.    soy isofla/blk cohosh/mag bark (ESTROVEN ORAL) Take by mouth once daily. (Patient not taking: Reported on 3/27/2025)    triamcinolone acetonide 0.1% (KENALOG) 0.1 % cream Apply topically 2 (two) times daily.    UNABLE TO FIND medication  name: Danilo Women's Multi Gummy    UNABLE TO FIND Take 1 mL by mouth once daily. medication name: Milk Thistle Extract 1000 mg, dissolve in water. (Patient not taking: Reported on 3/27/2025)    valACYclovir (VALTREX) 1000 MG tablet Take 2 tablets (2,000 mg total) by mouth every 12 (twelve) hours.     No current facility-administered medications for this visit.       Review of patient's allergies indicates:  No Known Allergies    Family History   Problem Relation Name Age of Onset    Arthritis Mother Emilie Gomez     Arthritis Father Albert Gomez     Cancer Father Albert Gomez         Melnoma cancer of the eye    Hearing loss Father Albert Gomez     Cancer Sister Ana Paula         Skin cancer       Social History[1]    Chief Complaint:   No chief complaint on file.      Date of surgery:  February 28, 2025    History of present illness:  49-year-old female underwent open reduction internal fixation of a proximal humerus fracture.  She is doing well.  Progressing slowly with physical therapy.  Still has a fair amount of stiffness but is making progress.    Review of Systems:    Musculoskeletal:  See HPI        Physical Examination:    Vital Signs:    There were no vitals filed for this visit.      There is no height or weight on file to calculate BMI.    This a well-developed, well nourished patient in no acute distress.  They are alert and oriented and cooperative to examination.  Pt. walks without an antalgic gait.      Examination left shoulder shows well-healed surgical incision.  No erythema drainage.  Neurovascularly intact.  No significant bruising or swelling.  Forward flexion of about 150° with external rotation of 70°.    X-rays:  X-rays left shoulder ordered and reviewed which show well-aligned proximal humerus fracture with 2 screws in place     Assessment::  Status post open reduction internal fixation of left proximal humerus fracture    Plan:  I reviewed the x-ray with her today.  Continue the formal  physical therapy.  Follow up in 2 months with 1 final x-ray of the left shoulder.      This note was created using [employer] voice recognition software that occasionally misinterpreted phrases or words.               [1]   Social History  Socioeconomic History    Marital status:    Tobacco Use    Smoking status: Never     Passive exposure: Never    Smokeless tobacco: Never   Substance and Sexual Activity    Alcohol use: Yes     Alcohol/week: 1.0 standard drink of alcohol     Types: 1 Unspecified drink type per week     Comment: This may only be one to two times a month    Drug use: Never    Sexual activity: Not Currently     Partners: Male     Birth control/protection: Abstinence     Social Drivers of Health     Financial Resource Strain: Low Risk  (6/18/2024)    Overall Financial Resource Strain (CARDIA)     Difficulty of Paying Living Expenses: Not hard at all   Food Insecurity: No Food Insecurity (6/18/2024)    Hunger Vital Sign     Worried About Running Out of Food in the Last Year: Never true     Ran Out of Food in the Last Year: Never true   Transportation Needs: No Transportation Needs (12/22/2022)    PRAPARE - Transportation     Lack of Transportation (Medical): No     Lack of Transportation (Non-Medical): No   Physical Activity: Inactive (6/18/2024)    Exercise Vital Sign     Days of Exercise per Week: 0 days     Minutes of Exercise per Session: 0 min   Stress: No Stress Concern Present (6/18/2024)    [employer] - Occupational Stress Questionnaire     Feeling of Stress : Only a little   Housing Stability: Low Risk  (12/22/2022)    Housing Stability Vital Sign     Unable to Pay for Housing in the Last Year: No     Number of Places Lived in the Last Year: 1     Unstable Housing in the Last Year: No

## 2025-05-02 ENCOUNTER — DOCUMENTATION ONLY (OUTPATIENT)
Dept: REHABILITATION | Facility: HOSPITAL | Age: 50
End: 2025-05-02
Payer: COMMERCIAL

## 2025-05-02 NOTE — PROGRESS NOTES
PT/PTA met face to face to discuss pt's treatment plan and progress towards established goals. Pt will be seen by a physical therapist minimally every 6th visit or every 30 days.    Kaye Arauoj PTA

## 2025-05-06 ENCOUNTER — CLINICAL SUPPORT (OUTPATIENT)
Dept: REHABILITATION | Facility: HOSPITAL | Age: 50
End: 2025-05-06
Payer: COMMERCIAL

## 2025-05-06 DIAGNOSIS — R29.898 DECREASED STRENGTH OF UPPER EXTREMITY: ICD-10-CM

## 2025-05-06 DIAGNOSIS — R29.3 POOR POSTURE: ICD-10-CM

## 2025-05-06 DIAGNOSIS — M25.612 DECREASED RANGE OF MOTION OF LEFT SHOULDER: Primary | ICD-10-CM

## 2025-05-06 PROCEDURE — 97112 NEUROMUSCULAR REEDUCATION: CPT | Mod: PN,CQ

## 2025-05-06 PROCEDURE — 97110 THERAPEUTIC EXERCISES: CPT | Mod: PN,CQ

## 2025-05-06 NOTE — PROGRESS NOTES
Outpatient Rehab    Physical Therapy Visit    Patient Name: Zelda Acevedo  MRN: [id number]  YOB: 1975  Encounter Date: 5/6/2025    Therapy Diagnosis:   Encounter Diagnoses   Name Primary?    Decreased range of motion of left shoulder Yes    Poor posture     Decreased strength of upper extremity      Physician: Kenji Maradiaga,*    Physician Orders: Eval and Treat  Medical Diagnosis: Left shoulder pain, unspecified chronicity  Other closed displaced fracture of proximal end of left humerus with routine healing, subsequent encounter    Visit # / Visits Authorized:  9 / 15  Insurance Authorization Period: 4/4/2025 to 12/31/2025       PT/PTA: PTA   Number of PTA visits since last PT visit:3    Time In: 0658   Time Out: 0750  Total Time (in minutes): 52   Total Billable Time (in minutes): 52    FOTO:  Intake Score:  %  Survey Score 2:  %  Survey Score 3:  %         Subjective   Pain in elbow today, L UE was swollen the other day, discomfort, no pain.  Pain reported as 0/10.      Objective          Therapeutic Exercise to improve strength and Range of Motion x 27  minutes:     UBE 3/3, level 2     AAROM flexion, ABD, and External Rotation, Internal Rotation with dowel x 20 each  Standing shoulder abduction 20 reps AROM  Standing shoulder flexion AROM 20 reps     Overhead Pulleys: flexion and abduction x 2 min each direction    NP  Cable column: AAROM 3# Overhead Pulleys AAROM flexion, LUE x 30     Ladder walk x 20 reps   np  Wall slides flexion 20 reps     Balance/Neuromuscular Re-Education to improve posture, coordination, proprioception x  25 minutes:     Internal Rotation with strap behind back x 20     Isometric walk outs ER/IR  RED TB 20 reps each side     Sidelying  ER AROM 1# 2 x 10  Sidelying abduction 1# 2 x 10  Supine butterflies, hands behind head  20        PROM flexion, abduction, external rotation  np    Time Entry(in minutes):  Neuromuscular Re-Education Time Entry: 25  Therapeutic  Exercise Time Entry: 27    Assessment & Plan   Assessment: ROM is slowly improving.  Fatigue in shoulder with therex.       Patient will continue to benefit from skilled outpatient physical therapy to address the deficits listed in the problem list box on initial evaluation, provide pt/family education and to maximize pt's level of independence in the home and community environment.     Patient's spiritual, cultural, and educational needs considered and patient agreeable to plan of care and goals.           Plan: Continue POC as indicated. Progress as tolerated.    Goals:   Active       Long Term Goals       Patient will increase LUE AROM to >/= 150 deg flexion, 150 deg abduction, and 75 deg ER to improve reaching  (Progressing)       Start:  04/04/25    Expected End:  05/30/25            Patient will demonstrate symmetry with apley IR and ER to improve ability to perform IADLS (Progressing)       Start:  04/04/25    Expected End:  05/30/25            Patient will report >/= 69% function on FOTO to improve return to PLOF  (Progressing)       Start:  04/04/25    Expected End:  05/30/25            Patient will increase LUE strength to >/= 4/5 to improve lifting abilities  (Progressing)       Start:  04/04/25    Expected End:  05/30/25            - Pt will demonstrate independence with the HEP at discharge.  (Progressing)       Start:  04/04/25    Expected End:  05/30/25               Short Term Goal       Patient will increase LUE AROM to >/= 130 deg flexion, 130 deg abduction, and 50 deg ER to improve reaching  (Progressing)       Start:  04/04/25    Expected End:  05/02/25            Patient will report </= 2/10 for at worst pain to improve quality of life (Progressing)       Start:  04/04/25    Expected End:  05/02/25            Patient will reach thoracic spine with apley flexion ER to improve ability to fix her hair (Progressing)       Start:  04/04/25    Expected End:  05/02/25            Patient will reach  lumbar spine with apley extension/IR to improve ability to doff bra (Progressing)       Start:  04/04/25    Expected End:  05/02/25            Patient will report >/= 59% function on FOTO to improve return to PLOF  (Progressing)       Start:  04/04/25    Expected End:  05/02/25                Kaye Araujo, PTA

## 2025-05-07 NOTE — PROGRESS NOTES
Outpatient Rehab    Physical Therapy Visit    Patient Name: Zelda Acevedo  MRN: [id number]  YOB: 1975  Encounter Date: 5/8/2025    Therapy Diagnosis:   Encounter Diagnoses   Name Primary?    Decreased range of motion of left shoulder Yes    Poor posture     Decreased strength of upper extremity        Physician: Kenji Maradiaga,*    Physician Orders: Eval and Treat  Medical Diagnosis: Left shoulder pain, unspecified chronicity  Other closed displaced fracture of proximal end of left humerus with routine healing, subsequent encounter    Visit # / Visits Authorized:  10 / 15  Insurance Authorization Period: 4/4/2025 to 12/31/2025       PT/PTA: PTA   Number of PTA visits since last PT visit:4    Time In: 1600   Time Out: 1656  Total Time (in minutes): 56   Total Billable Time (in minutes): 56    FOTO:  Intake Score:  %  Survey Score 2:  %  Survey Score 3:  %         Subjective   no complaints today.         Objective          Therapeutic Exercise to improve strength and Range of Motion x 26  minutes:     Upper Body Ergometer  3/3, level 2     AAROM flexion, Abduction, and External Rotation, Internal Rotation with dowel x 20 each  Standing shoulder abduction x 20 reps AROM  Standing shoulder flexion AROM x 20 reps     Overhead Pulleys: flexion and abduction x 2 min each direction  NP  Cable column: AAROM 3# Overhead Pulleys AAROM flexion, Left upper extremity x 30 reps      Ladder walk x 20 reps np  Wall slides flexion 20 reps     Balance/Neuromuscular Re-Education to improve posture, coordination, proprioception x  30 minutes:     Internal Rotation with strap behind back x 20 reps      Isometric walk outs external rotation /internal rotation red theraband x 20 reps each side     Sidelying external rotation Active Range of Motion 1# 2 x 10 reps    Sidelying abduction 1# 2 x 10 reps   Supine butterflies, hands behind head x 20 reps         PROM flexion, abduction, external rotation  np    Time  Entry(in minutes):  Neuromuscular Re-Education Time Entry: 30  Therapeutic Exercise Time Entry: 26    Assessment & Plan   Assessment: Zelda did not report any pain today.  She continued to show limitations in her ROM in all planes of motion.  She will continue to benefit from PT to address her ROM and upper extremity strength.  Evaluation/Treatment Tolerance: Patient tolerated treatment well    Patient will continue to benefit from skilled outpatient physical therapy to address the deficits listed in the problem list box on initial evaluation, provide pt/family education and to maximize pt's level of independence in the home and community environment.     Patient's spiritual, cultural, and educational needs considered and patient agreeable to plan of care and goals.           Plan:      Goals:   Active       Long Term Goals       Patient will increase LUE AROM to >/= 150 deg flexion, 150 deg abduction, and 75 deg ER to improve reaching  (Progressing)       Start:  04/04/25    Expected End:  05/30/25            Patient will demonstrate symmetry with apley IR and ER to improve ability to perform IADLS (Progressing)       Start:  04/04/25    Expected End:  05/30/25            Patient will report >/= 69% function on FOTO to improve return to PLOF  (Progressing)       Start:  04/04/25    Expected End:  05/30/25            Patient will increase LUE strength to >/= 4/5 to improve lifting abilities  (Progressing)       Start:  04/04/25    Expected End:  05/30/25            - Pt will demonstrate independence with the HEP at discharge.  (Progressing)       Start:  04/04/25    Expected End:  05/30/25               Short Term Goal       Patient will increase LUE AROM to >/= 130 deg flexion, 130 deg abduction, and 50 deg ER to improve reaching  (Progressing)       Start:  04/04/25    Expected End:  05/02/25            Patient will report </= 2/10 for at worst pain to improve quality of life (Progressing)       Start:  04/04/25     Expected End:  05/02/25            Patient will reach thoracic spine with apley flexion ER to improve ability to fix her hair (Progressing)       Start:  04/04/25    Expected End:  05/02/25            Patient will reach lumbar spine with apley extension/IR to improve ability to doff bra (Progressing)       Start:  04/04/25    Expected End:  05/02/25            Patient will report >/= 59% function on FOTO to improve return to PLOF  (Progressing)       Start:  04/04/25    Expected End:  05/02/25                Zohreh Johns, PTA

## 2025-05-08 ENCOUNTER — CLINICAL SUPPORT (OUTPATIENT)
Dept: REHABILITATION | Facility: HOSPITAL | Age: 50
End: 2025-05-08
Payer: COMMERCIAL

## 2025-05-08 DIAGNOSIS — R29.898 DECREASED STRENGTH OF UPPER EXTREMITY: ICD-10-CM

## 2025-05-08 DIAGNOSIS — M25.612 DECREASED RANGE OF MOTION OF LEFT SHOULDER: Primary | ICD-10-CM

## 2025-05-08 DIAGNOSIS — R29.3 POOR POSTURE: ICD-10-CM

## 2025-05-08 PROCEDURE — 97110 THERAPEUTIC EXERCISES: CPT | Mod: PN,CQ

## 2025-05-08 PROCEDURE — 97112 NEUROMUSCULAR REEDUCATION: CPT | Mod: PN,CQ

## 2025-05-13 ENCOUNTER — CLINICAL SUPPORT (OUTPATIENT)
Dept: REHABILITATION | Facility: HOSPITAL | Age: 50
End: 2025-05-13
Payer: COMMERCIAL

## 2025-05-13 DIAGNOSIS — M25.612 DECREASED RANGE OF MOTION OF LEFT SHOULDER: Primary | ICD-10-CM

## 2025-05-13 DIAGNOSIS — R29.3 POOR POSTURE: ICD-10-CM

## 2025-05-13 DIAGNOSIS — R29.898 DECREASED STRENGTH OF UPPER EXTREMITY: ICD-10-CM

## 2025-05-13 PROCEDURE — 97110 THERAPEUTIC EXERCISES: CPT | Mod: PN

## 2025-05-13 PROCEDURE — 97530 THERAPEUTIC ACTIVITIES: CPT | Mod: PN

## 2025-05-13 PROCEDURE — 97140 MANUAL THERAPY 1/> REGIONS: CPT | Mod: PN

## 2025-05-13 PROCEDURE — 97112 NEUROMUSCULAR REEDUCATION: CPT | Mod: PN

## 2025-05-13 NOTE — PROGRESS NOTES
Outpatient Rehab    Physical Therapy Visit    Patient Name: Zelda Acevedo  MRN: [id number]  YOB: 1975  Encounter Date: 5/13/2025    Therapy Diagnosis: No diagnosis found.  Physician: Kenji Maradiaga,*    Physician Orders: Eval and Treat  Medical Diagnosis: Left shoulder pain, unspecified chronicity  Other closed displaced fracture of proximal end of left humerus with routine healing, subsequent encounter    Visit # / Visits Authorized:  11 / 15  Insurance Authorization Period: 4/4/2025 to 12/31/2025  Date of Evaluation: 3/27/2025  Plan of Care Certification: 4/4/2025 to 5/30/2025      PT/PTA: PTA   Number of PTA visits since last PT visit:5    Time In: 0659   Time Out:    Total Time (in minutes):     Total Billable Time (in minutes):      FOTO:  Intake Score:  %  Survey Score 2:  %  Survey Score 3:  %    Precautions:       Subjective   doing well today.  Pain reported as 0/10.      Objective          Therapeutic Exercise to improve strength and Range of Motion x 26  minutes:     Upper Body Ergometer  3/3, level 2     AAROM flexion, Abduction, and External Rotation, Internal Rotation with dowel x 20 each  Standing shoulder abduction x 20 reps AROM  Standing shoulder flexion AROM x 20 reps     Overhead Pulleys: flexion and abduction x 2 min each direction  NP  Cable column: AAROM 3# Overhead Pulleys AAROM flexion, Left upper extremity x 30 reps      Ladder walk x 20 reps np  Wall slides flexion 20 reps     Balance/Neuromuscular Re-Education to improve posture, coordination, proprioception x  30 minutes:     Internal Rotation with strap behind back x 20 reps      Isometric walk outs external rotation /internal rotation red theraband x 20 reps each side     Sidelying external rotation Active Range of Motion 1# 2 x 10 reps    Sidelying abduction 1# 2 x 10 reps   Supine butterflies, hands behind head x 20 reps     Time Entry(in minutes):       Assessment & Plan   Assessment:         Patient will  continue to benefit from skilled outpatient physical therapy to address the deficits listed in the problem list box on initial evaluation, provide pt/family education and to maximize pt's level of independence in the home and community environment.     Patient's spiritual, cultural, and educational needs considered and patient agreeable to plan of care and goals.           Plan:      Goals:   Active       Long Term Goals       Patient will increase LUE AROM to >/= 150 deg flexion, 150 deg abduction, and 75 deg ER to improve reaching  (Progressing)       Start:  04/04/25    Expected End:  05/30/25            Patient will demonstrate symmetry with apley IR and ER to improve ability to perform IADLS (Progressing)       Start:  04/04/25    Expected End:  05/30/25            Patient will report >/= 69% function on FOTO to improve return to PLOF  (Progressing)       Start:  04/04/25    Expected End:  05/30/25            Patient will increase LUE strength to >/= 4/5 to improve lifting abilities  (Progressing)       Start:  04/04/25    Expected End:  05/30/25            - Pt will demonstrate independence with the HEP at discharge.  (Progressing)       Start:  04/04/25    Expected End:  05/30/25               Short Term Goal       Patient will increase LUE AROM to >/= 130 deg flexion, 130 deg abduction, and 50 deg ER to improve reaching  (Progressing)       Start:  04/04/25    Expected End:  05/02/25            Patient will report </= 2/10 for at worst pain to improve quality of life (Progressing)       Start:  04/04/25    Expected End:  05/02/25            Patient will reach thoracic spine with apley flexion ER to improve ability to fix her hair (Progressing)       Start:  04/04/25    Expected End:  05/02/25            Patient will reach lumbar spine with apley extension/IR to improve ability to doff bra (Progressing)       Start:  04/04/25    Expected End:  05/02/25            Patient will report >/= 59% function on FOTO  to improve return to PLOF  (Progressing)       Start:  04/04/25    Expected End:  05/02/25                Kaye Araujo, PTA

## 2025-05-13 NOTE — PROGRESS NOTES
Physical Therapy Visit    Patient Name: Zelda Acevedo  MRN: [id number]  YOB: 1975  Encounter Date: 5/13/2025    Therapy Diagnosis:   Encounter Diagnoses   Name Primary?    Decreased range of motion of left shoulder Yes    Poor posture     Decreased strength of upper extremity      Physician: Kenji Maradiaga,*    Physician Orders: Eval and Treat  Medical Diagnosis: Left shoulder pain, unspecified chronicity  Other closed displaced fracture of proximal end of left humerus with routine healing, subsequent encounter    Visit # / Visits Authorized:  11 / 15  Insurance Authorization Period: 4/4/2025 to 12/31/2025  Date of Evaluation: 3/27/2025  Plan of Care Certification: 4/4/2025 to 5/30/2025      PT/PTA: PT   Number of PTA visits since last PT visit:0  Time In: 0659   Time Out: 0803  Total Time (in minutes): 64   Total Billable Time (in minutes): 64    FOTO:  Intake Score: 49%  Survey Score 2: 53%  Survey Score 3: 71%    Precautions:       Subjective   Reports continued stiffness. Sometimes has pain at night if she rolls onto the shoulder. Reports minimal pain throughout the day unless she moves it a certain way or lifts something too heavy..  Pain reported as 0/10. L shoulder    Objective      Shoulder Range of Motion  Left Shoulder   Active (deg) Passive (deg) Pain   Flexion 140       Extension         Scaption         ABduction 120       ADduction         Horizontal ABduction         Horizontal ADduction         External Rotation (Shoulder ABducted 0 degrees)         External Rotation (Shoulder ABducted 45 degrees)         External Rotation (Shoulder ABducted 90 degrees) 30       Internal Rotation (Shoulder ABducted 0 degrees)         Internal Rotation (Shoulder ABducted 45 degrees)         Internal Rotation (Shoulder ABducted 90 degrees)             Shoulder, Elbow, or Forearm Range of Motion Details: Flexion, abduction and ER increased by 10 deg following PROM and manual therapy. Apley  flexion/ER: limited to base of neck. Apley extension/IR: limited to L sided low back at QL.            Treatment:  Therapeutic Exercise  TE 1: PROM flexion, abduction, external rotation  TE 2: AAROM external rotation seated 10x  TE 3: pulley flexion and abduction x 2 min  TE 4: shoulder addution stretch across the body 3 x 30 sec  TE 5: Shoulder flexion red TB, shoulder extenson red TB, shoulder ER red Tb, shoulder IR green TB 3 x 10  TE 6: SL sleeper stretch 10 x 10 sec  TE 8: Wall walk x 10 reps red TB  Manual Therapy  MT 1: grade 3-4 inferiorn glide, MWM into abduction  Balance/Neuromuscular Re-Education  NMR 1: Serratus wall slides 2 x 10  NMR 2: prone middle trap level 1 2 x 10  NMR 3: Prone rows 2 x 15 reps 2lb DB  Therapeutic Activity  TA 1: FOTO assessment  TA 2: ROM assessment    Time Entry(in minutes):  Manual Therapy Time Entry: 8  Neuromuscular Re-Education Time Entry: 10  Therapeutic Activity Time Entry: 8  Therapeutic Exercise Time Entry: 38    Assessment & Plan   Assessment: Patient arrives without complaints of pain and only stiffness. ROM does not show much change since last assessment with PT about 2 weeks ago. ROM improved by 10 deg following PROM and manual therapy. She tolerated tx session well with pain reported at end range PROM. She has nearly met short term ROM goal with ER as limiting factor. She continues to demonstrates superior migration of humeral head with overhead activities and ROM so focused on inferior capsule mobs to improve biomechanics of GH joint.  Evaluation/Treatment Tolerance: Patient tolerated treatment well    Patient will continue to benefit from skilled outpatient physical therapy to address the deficits listed in the problem list box on initial evaluation, provide pt/family education and to maximize pt's level of independence in the home and community environment.     Patient's spiritual, cultural, and educational needs considered and patient agreeable to plan of care  and goals.           Plan:      Goals:   Active       Long Term Goals       Patient will increase LUE AROM to >/= 150 deg flexion, 150 deg abduction, and 75 deg ER to improve reaching  (Progressing)       Start:  04/04/25    Expected End:  05/30/25            Patient will demonstrate symmetry with apley IR and ER to improve ability to perform IADLS (Progressing)       Start:  04/04/25    Expected End:  05/30/25            Patient will report >/= 69% function on FOTO to improve return to PLOF  (Progressing)       Start:  04/04/25    Expected End:  05/30/25            Patient will increase LUE strength to >/= 4/5 to improve lifting abilities  (Progressing)       Start:  04/04/25    Expected End:  05/30/25            - Pt will demonstrate independence with the HEP at discharge.  (Progressing)       Start:  04/04/25    Expected End:  05/30/25               Short Term Goal       Patient will increase LUE AROM to >/= 130 deg flexion, 130 deg abduction, and 50 deg ER to improve reaching  (Progressing)       Start:  04/04/25    Expected End:  05/30/25            Patient will report </= 2/10 for at worst pain to improve quality of life (Met)       Start:  04/04/25    Expected End:  05/02/25    Resolved:  05/13/25         Patient will reach thoracic spine with apley flexion ER to improve ability to fix her hair (Progressing)       Start:  04/04/25    Expected End:  05/30/25            Patient will reach lumbar spine with apley extension/IR to improve ability to doff bra (Progressing)       Start:  04/04/25    Expected End:  05/30/25            Patient will report >/= 59% function on FOTO to improve return to PLOF  (Progressing)       Start:  04/04/25    Expected End:  05/30/25                Nikole Proctor, PT

## 2025-05-15 ENCOUNTER — CLINICAL SUPPORT (OUTPATIENT)
Dept: REHABILITATION | Facility: HOSPITAL | Age: 50
End: 2025-05-15
Payer: COMMERCIAL

## 2025-05-15 DIAGNOSIS — R29.3 POOR POSTURE: ICD-10-CM

## 2025-05-15 DIAGNOSIS — M25.612 DECREASED RANGE OF MOTION OF LEFT SHOULDER: Primary | ICD-10-CM

## 2025-05-15 DIAGNOSIS — R29.898 DECREASED STRENGTH OF UPPER EXTREMITY: ICD-10-CM

## 2025-05-15 PROCEDURE — 97112 NEUROMUSCULAR REEDUCATION: CPT | Mod: KX,PN,CQ

## 2025-05-15 PROCEDURE — 97110 THERAPEUTIC EXERCISES: CPT | Mod: KX,PN,CQ

## 2025-05-15 PROCEDURE — 97140 MANUAL THERAPY 1/> REGIONS: CPT | Mod: KX,PN,CQ

## 2025-05-16 NOTE — PROGRESS NOTES
Outpatient Rehab    Physical Therapy Visit    Patient Name: Zelda Acevedo  MRN: [id number]  YOB: 1975  Encounter Date: 5/15/2025    Therapy Diagnosis:   Encounter Diagnoses   Name Primary?    Decreased range of motion of left shoulder Yes    Poor posture     Decreased strength of upper extremity      Physician: Kenji Maradiaga,*    Physician Orders: Eval and Treat  Medical Diagnosis: Left shoulder pain, unspecified chronicity  Other closed displaced fracture of proximal end of left humerus with routine healing, subsequent encounter    Visit # / Visits Authorized:  12 / 15  Insurance Authorization Period: 4/4/2025 to 12/31/2025  Date of Evaluation: 3/27/2025  Plan of Care Certification: 4/4/2025 to 5/30/2025      PT/PTA: PTA   Number of PTA visits since last PT visit:1  Time In: 1558   Time Out: 1643  Total Time (in minutes): 45   Total Billable Time (in minutes): 45    FOTO:  Intake Score:  %  Survey Score 2:  %  Survey Score 3:  %    Precautions:       Subjective   She feels she is doing better.  Pain reported as 0/10.      Objective          Therex x 8 minutes     UBE 3/3 level 2    Overhead Pulleys flexion and abduction x 2 min each    AAROM external rotation seated 10x        shoulder adduction stretch across the body 3 x 30 sec  Shoulder flexion red TB, rows, shoulder extenson red TB, shoulder ER red Tb, shoulder IR green TB 3 x 10 each    SL sleeper stretch 10 x 10 sec  np    Wall walk x 10 reps red TB        Balance/Neuromuscular Re-Education x 29 minutes  Serratus wall slides 2 x 10    Prone middle trap level 1 2 x 10  Prone rows 2 x 15 reps 2lb DB    Manual Therapy x 8 minutes  MT 1: grade 2 inferior, posterior GH glide  Stretching into abduction    Time Entry(in minutes):  Manual Therapy Time Entry: 8  Neuromuscular Re-Education Time Entry: 29  Therapeutic Exercise Time Entry: 8    Assessment & Plan   Assessment: Nitza continues to improve with L shoulder ROM, but does still remain  limited in her motion.  Pain at end range passive stretching.       Patient will continue to benefit from skilled outpatient physical therapy to address the deficits listed in the problem list box on initial evaluation, provide pt/family education and to maximize pt's level of independence in the home and community environment.     Patient's spiritual, cultural, and educational needs considered and patient agreeable to plan of care and goals.           Plan: Continue POC as indicated. Progress as tolerated.    Goals:   Active       Long Term Goals       Patient will increase LUE AROM to >/= 150 deg flexion, 150 deg abduction, and 75 deg ER to improve reaching  (Progressing)       Start:  04/04/25    Expected End:  05/30/25            Patient will demonstrate symmetry with apley IR and ER to improve ability to perform IADLS (Progressing)       Start:  04/04/25    Expected End:  05/30/25            Patient will report >/= 69% function on FOTO to improve return to PLOF  (Progressing)       Start:  04/04/25    Expected End:  05/30/25            Patient will increase LUE strength to >/= 4/5 to improve lifting abilities  (Progressing)       Start:  04/04/25    Expected End:  05/30/25            - Pt will demonstrate independence with the HEP at discharge.  (Progressing)       Start:  04/04/25    Expected End:  05/30/25               Short Term Goal       Patient will increase LUE AROM to >/= 130 deg flexion, 130 deg abduction, and 50 deg ER to improve reaching  (Progressing)       Start:  04/04/25    Expected End:  05/30/25            Patient will report </= 2/10 for at worst pain to improve quality of life (Met)       Start:  04/04/25    Expected End:  05/02/25    Resolved:  05/13/25         Patient will reach thoracic spine with apley flexion ER to improve ability to fix her hair (Progressing)       Start:  04/04/25    Expected End:  05/30/25            Patient will reach lumbar spine with apley extension/IR to improve  ability to doff bra (Progressing)       Start:  04/04/25    Expected End:  05/30/25            Patient will report >/= 59% function on FOTO to improve return to PLOF  (Progressing)       Start:  04/04/25    Expected End:  05/30/25                Kaye Araujo, PTA

## 2025-05-20 ENCOUNTER — CLINICAL SUPPORT (OUTPATIENT)
Dept: REHABILITATION | Facility: HOSPITAL | Age: 50
End: 2025-05-20
Payer: COMMERCIAL

## 2025-05-20 DIAGNOSIS — M25.512 LEFT SHOULDER PAIN, UNSPECIFIED CHRONICITY: ICD-10-CM

## 2025-05-20 DIAGNOSIS — R29.898 DECREASED STRENGTH OF UPPER EXTREMITY: ICD-10-CM

## 2025-05-20 DIAGNOSIS — S42.292D OTHER CLOSED DISPLACED FRACTURE OF PROXIMAL END OF LEFT HUMERUS WITH ROUTINE HEALING, SUBSEQUENT ENCOUNTER: ICD-10-CM

## 2025-05-20 DIAGNOSIS — M25.612 DECREASED RANGE OF MOTION OF LEFT SHOULDER: Primary | ICD-10-CM

## 2025-05-20 DIAGNOSIS — R29.3 POOR POSTURE: ICD-10-CM

## 2025-05-20 DIAGNOSIS — M25.611 DECREASED RIGHT SHOULDER RANGE OF MOTION: ICD-10-CM

## 2025-05-20 PROCEDURE — 97112 NEUROMUSCULAR REEDUCATION: CPT | Mod: PN

## 2025-05-20 PROCEDURE — 97110 THERAPEUTIC EXERCISES: CPT | Mod: PN

## 2025-05-20 NOTE — PROGRESS NOTES
Physical Therapy Progress Note : Updated Plan of Care    Patient Name: Zelda Acevedo  MRN: [id number]  YOB: 1975  Encounter Date: 5/20/2025    Therapy Diagnosis:   Encounter Diagnoses   Name Primary?    Decreased range of motion of left shoulder Yes    Poor posture     Decreased strength of upper extremity     Left shoulder pain, unspecified chronicity     Other closed displaced fracture of proximal end of left humerus with routine healing, subsequent encounter     Decreased right shoulder range of motion      Physician: Kenji Maradiaga,*    Physician Orders: Eval and Treat  Medical Diagnosis: Left shoulder pain, unspecified chronicity  Other closed displaced fracture of proximal end of left humerus with routine healing, subsequent encounter    Visit # / Visits Authorized:  13 / 15  Insurance Authorization Period: 4/4/2025 to 12/31/2025  Date of Evaluation: 3/7/2025  Plan of Care Certification: 5/20/2025 to 8/20/2025     PT/PTA: PT   Number of PTA visits since last PT visit:0  Time In: 0701   Time Out: 0803  Total Time (in minutes): 62   Total Billable Time (in minutes): 62    FOTO:  Intake Score: 49%  Survey Score 2: 53%  Survey Score 3: 71%    Precautions:       Subjective   Reports the shoulder bothered her more than usual last night. She feels like since maybe adding some exercises she is noticing it more. She reports when she rolled on it last night it woke her up a few times..  Pain reported as 0/10. L shoulder    Objective      Shoulder Range of Motion  Left Shoulder   Active (deg) Passive (deg) Pain   Flexion 155 165     Extension         Scaption         ABduction 120 140     ADduction         Horizontal ABduction         Horizontal ADduction         External Rotation (Shoulder ABducted 0 degrees)         External Rotation (Shoulder ABducted 45 degrees)         External Rotation (Shoulder ABducted 90 degrees) 55 65     Internal Rotation (Shoulder ABducted 0 degrees)         Internal  Rotation (Shoulder ABducted 45 degrees)         Internal Rotation (Shoulder ABducted 90 degrees) 60                     Shoulder Strength - Planes of Motion   Right Strength Right Pain Left Strength Left  Pain   Flexion     3-     Extension     3-     ABduction     3-     ADduction           Horizontal ABduction           Horizontal ADduction           Internal Rotation 0°     3-     Internal Rotation 90°           External Rotation 0°     3-     External Rotation 90°                            Treatment:  Therapeutic Exercise  TE 1: PROM flexion, abduction, external rotation  TE 2: AAROM ER flexion and abduction with dowel 20 reps  TE 3: pulley flexion x 2 min  TE 9: wall slides flexion 20 reps  TE 10: ROM measurements and testing  Manual Therapy  MT 1: grade 3-4 inferiorn glide, MWM into abduction  Balance/Neuromuscular Re-Education  NMR 1: Serratus wall slides 2 x 10 yellow TB  NMR 2: prone middle trap level 1 2 x 10  NMR 3: Prone rows 2 x 10 reps 2lb DB  NMR 4: Prone T 2 x 10  NMR 5: attempted prone Y difficulty so ceased  NMR 6: Standing scaption 1lb DB 2 x 10  NMR 7: Shoulder ER red TB 3 x 10  NMR 8: prone scapular retractions 15 reps    Time Entry(in minutes):  Manual Therapy Time Entry: 5  Neuromuscular Re-Education Time Entry: 30  Therapeutic Exercise Time Entry: 27    Assessment & Plan   Assessment  [place]                 Assessment Details: Patient demonstrates improvement in range of motion and strength since evaluation. She continues to lack full AROM but continues to demonstrate gradual improvements in her range of motion. She continues to demonstrate greatest restriction with ER and abduction impacting her ability to reach overhead. She demonstrates improvement in functional mobility based on FOTO score. She also continues to demonstrate weakness of LUE impacting her ability to lift and return to PLOF. She will continue to benefit from skilled Physical Therapist services to continue to address range  of motion and strength deficits to maximize functional mobility and return to PLOF.     Plan  From a physical therapy perspective, the patient would benefit from: Skilled Rehab Services                Visit Frequency: 2 times Per Week for 4 Weeks.                     Patient will continue to benefit from skilled outpatient physical therapy to address the deficits listed in the problem list box on initial evaluation, provide pt/family education and to maximize pt's level of independence in the home and community environment.     Patient's spiritual, cultural, and educational needs considered and patient agreeable to plan of care and goals.           Goals:   Active       Long Term Goals       Patient will increase LUE AROM to >/= 150 deg flexion, 150 deg abduction, and 75 deg ER to improve reaching  (Progressing)       Start:  04/04/25    Expected End:  05/30/25            Patient will demonstrate symmetry with apley IR and ER to improve ability to perform IADLS (Progressing)       Start:  04/04/25    Expected End:  05/30/25            Patient will report >/= 69% function on FOTO to improve return to PLOF  (Met)       Start:  04/04/25    Expected End:  05/30/25    Resolved:  05/20/25         Patient will increase LUE strength to >/= 4/5 to improve lifting abilities  (Progressing)       Start:  04/04/25    Expected End:  05/30/25            - Pt will demonstrate independence with the HEP at discharge.  (Progressing)       Start:  04/04/25    Expected End:  05/30/25               Short Term Goal       Patient will increase LUE AROM to >/= 130 deg flexion, 130 deg abduction, and 50 deg ER to improve reaching  (Progressing)       Start:  04/04/25    Expected End:  05/30/25            Patient will report </= 2/10 for at worst pain to improve quality of life (Met)       Start:  04/04/25    Expected End:  05/02/25    Resolved:  05/13/25         Patient will reach thoracic spine with apley flexion ER to improve ability to  fix her hair (Progressing)       Start:  04/04/25    Expected End:  05/30/25            Patient will reach lumbar spine with apley extension/IR to improve ability to doff bra (Progressing)       Start:  04/04/25    Expected End:  05/30/25            Patient will report >/= 59% function on FOTO to improve return to PLOF  (Met)       Start:  04/04/25    Expected End:  05/30/25    Resolved:  05/20/25             Nikole Proctor, PT

## 2025-05-21 NOTE — PROGRESS NOTES
Physical Therapy Progress Note : Updated Plan of Care    Patient Name: Zelda Acevedo  MRN: [id number]  YOB: 1975  Encounter Date: 5/22/2025    Therapy Diagnosis:   Encounter Diagnoses   Name Primary?    Decreased range of motion of left shoulder Yes    Poor posture     Decreased strength of upper extremity        Physician: Kenji Maradiaga,*    Physician Orders: Eval and Treat  Medical Diagnosis: Left shoulder pain, unspecified chronicity  Other closed displaced fracture of proximal end of left humerus with routine healing, subsequent encounter    Visit # / Visits Authorized:  14 / 27  Insurance Authorization Period: 4/4/2025 to 12/31/2025  Date of Evaluation: 3/7/2025  Plan of Care Certification: 5/20/2025 to 8/20/2025     PT/PTA: PTA   Number of PTA visits since last PT visit: 1  Time In: 1553   Time Out: 1650  Total Time (in minutes): 57   Total Billable Time (in minutes):  57    FOTO:  Intake Score:  %  Survey Score 2:  %  Survey Score 3:  %    Precautions:       Subjective   No complaints today.    L shoulder    Objective          Treatment:   Therapeutic Exercise - 15 minutes  Upper Body Ergometer 3'/3'  Active assist range of motion external rotation  flexion and abduction with dowel x 20 reps  pulley flexion and Abduction x 2 min each     Manual Therapy - 10 minutes  grade 3-4 inferiorn glide, MWM into abduction  PROM flexion, abduction, external rotation    Balance/Neuromuscular Re-Education 32 minutes  Serratus wall slides 2 x 10 reps yellow TB  Wall walk ups yellow theraband x 20 reps   prone scapular retractions x 20 reps  prone middle trap level 1 2 x 10 reps   Prone rows 2 x 10 reps 2lb DB  Prone  extension   Prone flexion x 6 reps (pinching/painful)    attempted prone Y difficulty so ceased - np    Standing scaption 1lb DB 2 x 10 reps   Shoulder external rotation and internal rotation red TB 3 x 10 reps      Time Entry(in minutes):  Manual Therapy Time Entry:   Neuromuscular  Re-Education Time Entry:   Therapeutic Exercise Time Entry:        Time Entry(in minutes):  Manual Therapy Time Entry: 10  Neuromuscular Re-Education Time Entry: 32  Therapeutic Exercise Time Entry: 15    Assessment & Plan    Zelda continues to have limited ROM especially with flex, abduction, IR/ER. She experienced some discomfort at end range of passive flexion.       Patient will continue to benefit from skilled outpatient physical therapy to address the deficits listed in the problem list box on initial evaluation, provide pt/family education and to maximize pt's level of independence in the home and community environment.     Patient's spiritual, cultural, and educational needs considered and patient agreeable to plan of care and goals.           Goals:   Active       Long Term Goals       Patient will increase LUE AROM to >/= 150 deg flexion, 150 deg abduction, and 75 deg ER to improve reaching  (Progressing)       Start:  04/04/25    Expected End:  05/30/25            Patient will demonstrate symmetry with apley IR and ER to improve ability to perform IADLS (Progressing)       Start:  04/04/25    Expected End:  05/30/25            Patient will report >/= 69% function on FOTO to improve return to PLOF  (Met)       Start:  04/04/25    Expected End:  05/30/25    Resolved:  05/20/25         Patient will increase LUE strength to >/= 4/5 to improve lifting abilities  (Progressing)       Start:  04/04/25    Expected End:  05/30/25            - Pt will demonstrate independence with the HEP at discharge.  (Progressing)       Start:  04/04/25    Expected End:  05/30/25               Short Term Goal       Patient will increase LUE AROM to >/= 130 deg flexion, 130 deg abduction, and 50 deg ER to improve reaching  (Progressing)       Start:  04/04/25    Expected End:  05/30/25            Patient will report </= 2/10 for at worst pain to improve quality of life (Met)       Start:  04/04/25    Expected End:  05/02/25     Resolved:  05/13/25         Patient will reach thoracic spine with apley flexion ER to improve ability to fix her hair (Progressing)       Start:  04/04/25    Expected End:  05/30/25            Patient will reach lumbar spine with apley extension/IR to improve ability to doff bra (Progressing)       Start:  04/04/25    Expected End:  05/30/25            Patient will report >/= 59% function on FOTO to improve return to PLOF  (Met)       Start:  04/04/25    Expected End:  05/30/25    Resolved:  05/20/25               Zohreh Johns, PTA

## 2025-05-22 ENCOUNTER — CLINICAL SUPPORT (OUTPATIENT)
Dept: REHABILITATION | Facility: HOSPITAL | Age: 50
End: 2025-05-22
Payer: COMMERCIAL

## 2025-05-22 DIAGNOSIS — M25.612 DECREASED RANGE OF MOTION OF LEFT SHOULDER: Primary | ICD-10-CM

## 2025-05-22 DIAGNOSIS — R29.3 POOR POSTURE: ICD-10-CM

## 2025-05-22 DIAGNOSIS — R29.898 DECREASED STRENGTH OF UPPER EXTREMITY: ICD-10-CM

## 2025-05-22 PROCEDURE — 97112 NEUROMUSCULAR REEDUCATION: CPT | Mod: PN,CQ

## 2025-05-22 PROCEDURE — 97140 MANUAL THERAPY 1/> REGIONS: CPT | Mod: PN,CQ

## 2025-05-22 PROCEDURE — 97110 THERAPEUTIC EXERCISES: CPT | Mod: PN,CQ

## 2025-05-27 ENCOUNTER — TELEPHONE (OUTPATIENT)
Dept: FAMILY MEDICINE | Facility: CLINIC | Age: 50
End: 2025-05-27
Payer: COMMERCIAL

## 2025-05-27 NOTE — TELEPHONE ENCOUNTER
----- Message from Zaida sent at 5/27/2025  8:23 AM CDT -----  Pt needs to reschedule her 6/18/25 appt. Pt #[phone]

## 2025-05-28 ENCOUNTER — TELEPHONE (OUTPATIENT)
Dept: FAMILY MEDICINE | Facility: CLINIC | Age: 50
End: 2025-05-28
Payer: COMMERCIAL

## 2025-05-28 DIAGNOSIS — Z00.00 ROUTINE GENERAL MEDICAL EXAMINATION AT A HEALTH CARE FACILITY: ICD-10-CM

## 2025-05-28 DIAGNOSIS — I10 HYPERTENSION, UNSPECIFIED TYPE: Primary | ICD-10-CM

## 2025-05-28 DIAGNOSIS — E78.5 HYPERLIPIDEMIA, UNSPECIFIED HYPERLIPIDEMIA TYPE: ICD-10-CM

## 2025-05-28 DIAGNOSIS — Z79.899 HIGH RISK MEDICATION USE: ICD-10-CM

## 2025-05-28 DIAGNOSIS — Z51.81 ENCOUNTER FOR THERAPEUTIC DRUG MONITORING: ICD-10-CM

## 2025-05-28 DIAGNOSIS — Z79.899 ENCOUNTER FOR LONG-TERM (CURRENT) USE OF MEDICATIONS: ICD-10-CM

## 2025-05-29 ENCOUNTER — CLINICAL SUPPORT (OUTPATIENT)
Dept: REHABILITATION | Facility: HOSPITAL | Age: 50
End: 2025-05-29
Payer: COMMERCIAL

## 2025-05-29 DIAGNOSIS — M25.612 DECREASED RANGE OF MOTION OF LEFT SHOULDER: Primary | ICD-10-CM

## 2025-05-29 DIAGNOSIS — R29.898 DECREASED STRENGTH OF UPPER EXTREMITY: ICD-10-CM

## 2025-05-29 DIAGNOSIS — R29.3 POOR POSTURE: ICD-10-CM

## 2025-05-29 PROCEDURE — 97112 NEUROMUSCULAR REEDUCATION: CPT | Mod: PN,CQ

## 2025-05-29 PROCEDURE — 97140 MANUAL THERAPY 1/> REGIONS: CPT | Mod: PN,CQ

## 2025-05-29 NOTE — PROGRESS NOTES
Outpatient Rehab    Physical Therapy Visit    Patient Name: Zelda Acevedo  MRN: [id number]  YOB: 1975  Encounter Date: 5/29/2025    Therapy Diagnosis:   Encounter Diagnoses   Name Primary?    Decreased range of motion of left shoulder Yes    Poor posture     Decreased strength of upper extremity      Physician: Kenji Maradiaga,*    Physician Orders: Eval and Treat  Medical Diagnosis: Left shoulder pain, unspecified chronicity  Other closed displaced fracture of proximal end of left humerus with routine healing, subsequent encounter    Visit # / Visits Authorized:  15 / 27  Insurance Authorization Period: 4/4/2025 to 12/31/2025  Date of Evaluation: 3/27/2025  Plan of Care Certification: 5/20/2025 to 8/20/2025      PT/PTA: PTA   Number of PTA visits since last PT visit:2    Time In: 1557   Time Out: 1646  Total Time (in minutes): 49   Total Billable Time (in minutes): 49    FOTO:  Intake Score:  %  Survey Score 2:  %  Survey Score 3:  %    Precautions:       Subjective   no complaints, doing well.  Pain reported as 0/10.      Objective          [id number]   Therapeutic Exercise -  6 minutes    Upper Body Ergometer 3'/3', level 2    AAROM with dowel external rotation  flexion and abduction x 20 each  np    OHP flexion and Abduction x 2 min each   np     Manual Therapy - 8 minutes  grade 3-4 inferiorn glide, MWM into abduction  PROM flexion, abduction, external rotation     Balance/Neuromuscular Re-Education 35 minutes    Serratus wall slides 2 x 10 reps yellow TB  Wall walk ups yellow theraband x 15     prone scapular retractions x 20 reps   np  prone middle trap level 1 2 x 10 reps   Prone rows 2 x 10 reps 2lb DB  Prone  extension 2 x 10  Prone flexion x 6 reps (pinching/painful)     attempted prone Y difficulty so ceased - np     Standing scaption 1lb DB 2 x 10 reps   Shoulder external rotation and internal rotation red TB 3 x 10 reps     Time Entry(in minutes):  Manual Therapy Time Entry:  8  Neuromuscular Re-Education Time Entry: 35    Assessment & Plan   Assessment: Zelda has made progress with L shoulder ROM, but remains limited.  She continues to turn her body and not move head, rounded shoulder posture remains.       The patient will continue to benefit from skilled outpatient physical therapy in order to address the deficits listed in the problem list on the initial evaluation, provide patient and family education, and maximize the patients level of independence in the home and community environments.     The patient's spiritual, cultural, and educational needs were considered, and the patient is agreeable to the plan of care and goals.           Plan: Cont with therapeutic ex, therapeutic activities, and neuromuscular re-ed as tolerated to progress towards patients goals per POC.    Goals:   Active       Long Term Goals       Patient will increase LUE AROM to >/= 150 deg flexion, 150 deg abduction, and 75 deg ER to improve reaching  (Progressing)       Start:  04/04/25    Expected End:  05/30/25            Patient will demonstrate symmetry with apley IR and ER to improve ability to perform IADLS (Progressing)       Start:  04/04/25    Expected End:  05/30/25            Patient will report >/= 69% function on FOTO to improve return to PLOF  (Met)       Start:  04/04/25    Expected End:  05/30/25    Resolved:  05/20/25         Patient will increase LUE strength to >/= 4/5 to improve lifting abilities  (Progressing)       Start:  04/04/25    Expected End:  05/30/25            - Pt will demonstrate independence with the HEP at discharge.  (Progressing)       Start:  04/04/25    Expected End:  05/30/25               Short Term Goal       Patient will increase LUE AROM to >/= 130 deg flexion, 130 deg abduction, and 50 deg ER to improve reaching  (Progressing)       Start:  04/04/25    Expected End:  05/30/25            Patient will report </= 2/10 for at worst pain to improve quality of life (Met)        Start:  04/04/25    Expected End:  05/02/25    Resolved:  05/13/25         Patient will reach thoracic spine with apley flexion ER to improve ability to fix her hair (Progressing)       Start:  04/04/25    Expected End:  05/30/25            Patient will reach lumbar spine with apley extension/IR to improve ability to doff bra (Progressing)       Start:  04/04/25    Expected End:  05/30/25            Patient will report >/= 59% function on FOTO to improve return to PLOF  (Met)       Start:  04/04/25    Expected End:  05/30/25    Resolved:  05/20/25             Kaye Araujo, PTA

## 2025-06-03 ENCOUNTER — DOCUMENTATION ONLY (OUTPATIENT)
Dept: REHABILITATION | Facility: HOSPITAL | Age: 50
End: 2025-06-03

## 2025-06-03 ENCOUNTER — CLINICAL SUPPORT (OUTPATIENT)
Dept: REHABILITATION | Facility: HOSPITAL | Age: 50
End: 2025-06-03
Payer: COMMERCIAL

## 2025-06-03 DIAGNOSIS — R29.3 POOR POSTURE: ICD-10-CM

## 2025-06-03 DIAGNOSIS — M25.612 DECREASED RANGE OF MOTION OF LEFT SHOULDER: Primary | ICD-10-CM

## 2025-06-03 DIAGNOSIS — R29.898 DECREASED STRENGTH OF UPPER EXTREMITY: ICD-10-CM

## 2025-06-03 PROCEDURE — 97110 THERAPEUTIC EXERCISES: CPT | Mod: PN,CQ

## 2025-06-03 PROCEDURE — 97140 MANUAL THERAPY 1/> REGIONS: CPT | Mod: PN,CQ

## 2025-06-03 PROCEDURE — 97112 NEUROMUSCULAR REEDUCATION: CPT | Mod: PN,CQ

## 2025-06-05 ENCOUNTER — CLINICAL SUPPORT (OUTPATIENT)
Dept: REHABILITATION | Facility: HOSPITAL | Age: 50
End: 2025-06-05
Payer: COMMERCIAL

## 2025-06-05 DIAGNOSIS — M25.612 DECREASED RANGE OF MOTION OF LEFT SHOULDER: Primary | ICD-10-CM

## 2025-06-05 DIAGNOSIS — R29.898 DECREASED STRENGTH OF UPPER EXTREMITY: ICD-10-CM

## 2025-06-05 DIAGNOSIS — R29.3 POOR POSTURE: ICD-10-CM

## 2025-06-05 PROCEDURE — 97112 NEUROMUSCULAR REEDUCATION: CPT | Mod: KX,PN,CQ

## 2025-06-05 PROCEDURE — 97140 MANUAL THERAPY 1/> REGIONS: CPT | Mod: KX,PN,CQ

## 2025-06-05 PROCEDURE — 97110 THERAPEUTIC EXERCISES: CPT | Mod: KX,PN,CQ

## 2025-06-10 ENCOUNTER — CLINICAL SUPPORT (OUTPATIENT)
Dept: REHABILITATION | Facility: HOSPITAL | Age: 50
End: 2025-06-10
Payer: COMMERCIAL

## 2025-06-10 DIAGNOSIS — R29.898 DECREASED STRENGTH OF UPPER EXTREMITY: ICD-10-CM

## 2025-06-10 DIAGNOSIS — M25.612 DECREASED RANGE OF MOTION OF LEFT SHOULDER: Primary | ICD-10-CM

## 2025-06-10 DIAGNOSIS — R29.3 POOR POSTURE: ICD-10-CM

## 2025-06-10 PROCEDURE — 97112 NEUROMUSCULAR REEDUCATION: CPT | Mod: PN

## 2025-06-10 PROCEDURE — 97140 MANUAL THERAPY 1/> REGIONS: CPT | Mod: PN

## 2025-06-10 PROCEDURE — 97110 THERAPEUTIC EXERCISES: CPT | Mod: PN

## 2025-06-10 NOTE — PROGRESS NOTES
Physical Therapy Visit    Patient Name: Zelda Acevedo  MRN: [id number]  YOB: 1975  Encounter Date: 6/10/2025    Therapy Diagnosis:   Encounter Diagnoses   Name Primary?    Decreased range of motion of left shoulder Yes    Poor posture     Decreased strength of upper extremity      Physician: Kenji Maradiaga,*    Physician Orders: Eval and Treat  Medical Diagnosis: Left shoulder pain, unspecified chronicity  Other closed displaced fracture of proximal end of left humerus with routine healing, subsequent encounter  Surgical Diagnosis: Not applicable for this Episode   Surgical Date: Not applicable for this Episode    Visit # / Visits Authorized:  18 / 27  Insurance Authorization Period: 4/4/2025 to 12/31/2025  Date of Evaluation: 3/27/2025  Plan of Care Certification: 5/20/2025 to 8/20/2025      PT/PTA: PT   Number of PTA visits since last PT visit:0  Time In: 0803   Time Out: 0900  Total Time (in minutes): 57   Total Billable Time (in minutes): 57    FOTO:  Intake Score: 49%  Survey Score 2: 53%  Survey Score 3: 71%    Precautions:       Subjective   Reports she is doing well. The shoulder is still a little stiff. She reports improvement in stiffness following last tx session..  Pain reported as 0/10. L shoulder    Objective      Shoulder Range of Motion  Left Shoulder   Active (deg) Passive (deg) Pain   Flexion 140 150     Extension 50       Scaption         ABduction 120 140     ADduction         Horizontal ABduction         Horizontal ADduction         External Rotation (Shoulder ABducted 0 degrees)         External Rotation (Shoulder ABducted 45 degrees) 25 40     External Rotation (Shoulder ABducted 90 degrees) 25       Internal Rotation (Shoulder ABducted 0 degrees)         Internal Rotation (Shoulder ABducted 45 degrees)         Internal Rotation (Shoulder ABducted 90 degrees)                          Treatment:  Therapeutic Exercise  TE 1: PROM flexion, abduction, external rotation  TE 2:  Wall slides shoulder flexion 20 reps  TE 3: repeated shoulder extension 15 reps with mat elevated  TE 4: repeated adduction behind the back with towel 15 reps  TE 5: repeated IR towel stretch 15 reps  TE 10: ROM measurements and testing  Manual Therapy  MT 1: grade 3-4 inferiorn glide, MWM into abduction x 8 min  Balance/Neuromuscular Re-Education  NMR 2: prone middle trap level 1 2 x 10  NMR 3: prone T horizontal abduction 2 x 10  NMR 4: prone shoulder extension 2 x 10 1lb DB    Time Entry(in minutes):  Manual Therapy Time Entry: 8  Neuromuscular Re-Education Time Entry: 19  Therapeutic Exercise Time Entry: 30    Assessment & Plan   Assessment: Patient continues to demonstrate decreased L shoulder ROM in all directions afftecting her ability to reach overhead and behind her back. ROM does not demonstrate much progress since last assessment.  Evaluation/Treatment Tolerance: Patient tolerated treatment well    The patient will continue to benefit from skilled outpatient physical therapy in order to address the deficits listed in the problem list on the initial evaluation, provide patient and family education, and maximize the patients level of independence in the home and community environments.     The patient's spiritual, cultural, and educational needs were considered, and the patient is agreeable to the plan of care and goals.           Plan: Continue POC as indicated. Progress as tolerated.    Goals:   Active       Long Term Goals       Patient will increase LUE AROM to >/= 150 deg flexion, 150 deg abduction, and 75 deg ER to improve reaching  (Progressing)       Start:  04/04/25    Expected End:  05/30/25            Patient will demonstrate symmetry with apley IR and ER to improve ability to perform IADLS (Progressing)       Start:  04/04/25    Expected End:  05/30/25            Patient will report >/= 69% function on FOTO to improve return to PLOF  (Met)       Start:  04/04/25    Expected End:  05/30/25     Resolved:  05/20/25         Patient will increase LUE strength to >/= 4/5 to improve lifting abilities  (Progressing)       Start:  04/04/25    Expected End:  05/30/25            - Pt will demonstrate independence with the HEP at discharge.  (Progressing)       Start:  04/04/25    Expected End:  05/30/25               Short Term Goal       Patient will increase LUE AROM to >/= 130 deg flexion, 130 deg abduction, and 50 deg ER to improve reaching  (Progressing)       Start:  04/04/25    Expected End:  05/30/25            Patient will report </= 2/10 for at worst pain to improve quality of life (Met)       Start:  04/04/25    Expected End:  05/02/25    Resolved:  05/13/25         Patient will reach thoracic spine with apley flexion ER to improve ability to fix her hair (Progressing)       Start:  04/04/25    Expected End:  05/30/25            Patient will reach lumbar spine with apley extension/IR to improve ability to doff bra (Progressing)       Start:  04/04/25    Expected End:  05/30/25            Patient will report >/= 59% function on FOTO to improve return to PLOF  (Met)       Start:  04/04/25    Expected End:  05/30/25    Resolved:  05/20/25             Nikole Proctor, PT

## 2025-06-12 ENCOUNTER — CLINICAL SUPPORT (OUTPATIENT)
Dept: REHABILITATION | Facility: HOSPITAL | Age: 50
End: 2025-06-12
Payer: COMMERCIAL

## 2025-06-12 DIAGNOSIS — R29.898 DECREASED STRENGTH OF UPPER EXTREMITY: ICD-10-CM

## 2025-06-12 DIAGNOSIS — M25.612 DECREASED RANGE OF MOTION OF LEFT SHOULDER: Primary | ICD-10-CM

## 2025-06-12 DIAGNOSIS — R29.3 POOR POSTURE: ICD-10-CM

## 2025-06-12 PROCEDURE — 97110 THERAPEUTIC EXERCISES: CPT | Mod: KX,PN,CQ

## 2025-06-12 PROCEDURE — 97112 NEUROMUSCULAR REEDUCATION: CPT | Mod: PN,CQ

## 2025-06-12 NOTE — PROGRESS NOTES
Outpatient Rehab    Physical Therapy Visit    Patient Name: Zelda Acevedo  MRN: [id number]  YOB: 1975  Encounter Date: 6/12/2025    Therapy Diagnosis:   Encounter Diagnoses   Name Primary?    Decreased range of motion of left shoulder Yes    Poor posture     Decreased strength of upper extremity      Physician: Kenji Maradiaga,*    Physician Orders: Eval and Treat  Medical Diagnosis: Left shoulder pain, unspecified chronicity  Other closed displaced fracture of proximal end of left humerus with routine healing, subsequent encounter  Surgical Diagnosis: Not applicable for this Episode   Surgical Date: Not applicable for this Episode    Visit # / Visits Authorized:  19 / 27  Insurance Authorization Period: 4/4/2025 to 12/31/2025  Date of Evaluation: 3/27/2025  Plan of Care Certification: 5/20/2025 to 8/20/2025      PT/PTA: PTA   Number of PTA visits since last PT visit:1    Time In: 1554   Time Out: 1643  Total Time (in minutes): 49   Total Billable Time (in minutes): 49    FOTO:  Intake Score:  %  Survey Score 2:  %  Survey Score 3:  %    Precautions:       Subjective   Still feels looser.  Pain reported as 0/10.      Objective          [id number]  Therapeutic Exercise -  8 minutes     Upper Body Ergometer 3'/3', level 2    OHP flexion and Abduction x 2 min each   np     Anterior shoulder stretch 3 x 30 sec L    Manual Therapy - 0 minutes  Scapular scouring  Inferior shoulder glides, grade II  Contract/relax ER  grade 3-4 inferiorn glide, MWM into abduction np  PROM flexion, abduction, external rotation np     Balance/Neuromuscular Re-Education 41 minutes     Wall walk ups yellow theraband x 15 - with lift off  Repeated shoulder ADDuction behind back with towel x 20  Repeated Internal Rotation towel stretch x 20    Supine D2 flexion RED theraband x 20  prone scapular retractions x 20 reps   np  Prone middle trap level 1 2 x 10   Prone rows 2 x 10 reps 2lb DB   np  Prone  extension 2# 2 x 10 -  scapular setting  Prone flexion x 20  Prone Y x 20     Standing scaption 1lb DB 2 x 10 reps   np  Shoulder external rotation and internal rotation red TB 3 x 10 reps   np    Time Entry(in minutes):  Neuromuscular Re-Education Time Entry: 41  Therapeutic Exercise Time Entry: 8    Assessment & Plan   Assessment: Zelda continues with limitations of her L shoulder and cervical ROM.  Slow progress.       The patient will continue to benefit from skilled outpatient physical therapy in order to address the deficits listed in the problem list on the initial evaluation, provide patient and family education, and maximize the patients level of independence in the home and community environments.     The patient's spiritual, cultural, and educational needs were considered, and the patient is agreeable to the plan of care and goals.           Plan:      Goals:   Active       Long Term Goals       Patient will increase LUE AROM to >/= 150 deg flexion, 150 deg abduction, and 75 deg ER to improve reaching  (Progressing)       Start:  04/04/25    Expected End:  05/30/25            Patient will demonstrate symmetry with apley IR and ER to improve ability to perform IADLS (Progressing)       Start:  04/04/25    Expected End:  05/30/25            Patient will report >/= 69% function on FOTO to improve return to PLOF  (Met)       Start:  04/04/25    Expected End:  05/30/25    Resolved:  05/20/25         Patient will increase LUE strength to >/= 4/5 to improve lifting abilities  (Progressing)       Start:  04/04/25    Expected End:  05/30/25            - Pt will demonstrate independence with the HEP at discharge.  (Progressing)       Start:  04/04/25    Expected End:  05/30/25               Short Term Goal       Patient will increase LUE AROM to >/= 130 deg flexion, 130 deg abduction, and 50 deg ER to improve reaching  (Progressing)       Start:  04/04/25    Expected End:  05/30/25            Patient will report </= 2/10 for at worst  pain to improve quality of life (Met)       Start:  04/04/25    Expected End:  05/02/25    Resolved:  05/13/25         Patient will reach thoracic spine with apley flexion ER to improve ability to fix her hair (Progressing)       Start:  04/04/25    Expected End:  05/30/25            Patient will reach lumbar spine with apley extension/IR to improve ability to doff bra (Progressing)       Start:  04/04/25    Expected End:  05/30/25            Patient will report >/= 59% function on FOTO to improve return to PLOF  (Met)       Start:  04/04/25    Expected End:  05/30/25    Resolved:  05/20/25             Kaye Araujo, PTA

## 2025-06-17 ENCOUNTER — OFFICE VISIT (OUTPATIENT)
Dept: FAMILY MEDICINE | Facility: CLINIC | Age: 50
End: 2025-06-17
Payer: COMMERCIAL

## 2025-06-17 ENCOUNTER — CLINICAL SUPPORT (OUTPATIENT)
Dept: REHABILITATION | Facility: HOSPITAL | Age: 50
End: 2025-06-17
Payer: COMMERCIAL

## 2025-06-17 VITALS
HEART RATE: 88 BPM | HEIGHT: 61 IN | WEIGHT: 194.38 LBS | SYSTOLIC BLOOD PRESSURE: 126 MMHG | OXYGEN SATURATION: 98 % | BODY MASS INDEX: 36.7 KG/M2 | DIASTOLIC BLOOD PRESSURE: 84 MMHG

## 2025-06-17 DIAGNOSIS — E66.01 MORBID OBESITY: ICD-10-CM

## 2025-06-17 DIAGNOSIS — R29.898 DECREASED STRENGTH OF UPPER EXTREMITY: ICD-10-CM

## 2025-06-17 DIAGNOSIS — M25.612 DECREASED RANGE OF MOTION OF LEFT SHOULDER: ICD-10-CM

## 2025-06-17 DIAGNOSIS — Z12.11 SCREENING FOR COLON CANCER: Primary | ICD-10-CM

## 2025-06-17 DIAGNOSIS — I10 HYPERTENSION, UNSPECIFIED TYPE: ICD-10-CM

## 2025-06-17 DIAGNOSIS — R29.3 POOR POSTURE: ICD-10-CM

## 2025-06-17 DIAGNOSIS — Z98.890 HISTORY OF OPEN REDUCTION AND INTERNAL FIXATION (ORIF) PROCEDURE: ICD-10-CM

## 2025-06-17 DIAGNOSIS — F32.A DEPRESSION, UNSPECIFIED DEPRESSION TYPE: ICD-10-CM

## 2025-06-17 DIAGNOSIS — M25.612 DECREASED RANGE OF MOTION OF LEFT SHOULDER: Primary | ICD-10-CM

## 2025-06-17 DIAGNOSIS — E78.5 HYPERLIPIDEMIA, UNSPECIFIED HYPERLIPIDEMIA TYPE: ICD-10-CM

## 2025-06-17 PROCEDURE — 1159F MED LIST DOCD IN RCRD: CPT | Mod: CPTII,S$GLB,, | Performed by: NURSE PRACTITIONER

## 2025-06-17 PROCEDURE — 3079F DIAST BP 80-89 MM HG: CPT | Mod: CPTII,S$GLB,, | Performed by: NURSE PRACTITIONER

## 2025-06-17 PROCEDURE — 3008F BODY MASS INDEX DOCD: CPT | Mod: CPTII,S$GLB,, | Performed by: NURSE PRACTITIONER

## 2025-06-17 PROCEDURE — 97112 NEUROMUSCULAR REEDUCATION: CPT | Mod: PN,CQ

## 2025-06-17 PROCEDURE — 99214 OFFICE O/P EST MOD 30 MIN: CPT | Mod: S$GLB,,, | Performed by: NURSE PRACTITIONER

## 2025-06-17 PROCEDURE — 4010F ACE/ARB THERAPY RXD/TAKEN: CPT | Mod: CPTII,S$GLB,, | Performed by: NURSE PRACTITIONER

## 2025-06-17 PROCEDURE — 3074F SYST BP LT 130 MM HG: CPT | Mod: CPTII,S$GLB,, | Performed by: NURSE PRACTITIONER

## 2025-06-17 PROCEDURE — 1160F RVW MEDS BY RX/DR IN RCRD: CPT | Mod: CPTII,S$GLB,, | Performed by: NURSE PRACTITIONER

## 2025-06-17 PROCEDURE — 97110 THERAPEUTIC EXERCISES: CPT | Mod: KX,PN,CQ

## 2025-06-17 RX ORDER — ESCITALOPRAM OXALATE 20 MG/1
20 TABLET ORAL DAILY
Qty: 90 TABLET | Refills: 3 | Status: SHIPPED | OUTPATIENT
Start: 2025-06-17

## 2025-06-17 RX ORDER — LISINOPRIL 10 MG/1
10 TABLET ORAL DAILY
Qty: 90 TABLET | Refills: 3 | Status: SHIPPED | OUTPATIENT
Start: 2025-06-17

## 2025-06-17 NOTE — PROGRESS NOTES
Outpatient Rehab    Physical Therapy Visit    Patient Name: Zelda Acevedo  MRN: [id number]  YOB: 1975  Encounter Date: 6/17/2025    Therapy Diagnosis:   Encounter Diagnoses   Name Primary?    Decreased range of motion of left shoulder Yes    Poor posture     Decreased strength of upper extremity      Physician: Kenji Maradiaga,*    Physician Orders: Eval and Treat  Medical Diagnosis: Left shoulder pain, unspecified chronicity  Other closed displaced fracture of proximal end of left humerus with routine healing, subsequent encounter  Surgical Diagnosis: Not applicable for this Episode   Surgical Date: Not applicable for this Episode  Days Since Last Surgery: Not applicable for this Episode    Visit # / Visits Authorized:  20 / 27  Insurance Authorization Period: 4/4/2025 to 12/31/2025  Date of Evaluation: 3/27/2025  Plan of Care Certification: 5/20/2025 to 8/20/2025      PT/PTA: PTA   Number of PTA visits since last PT visit:2    Time In: 0700   Time Out: 0750  Total Time (in minutes): 50   Total Billable Time (in minutes): 50    FOTO:  Intake Score:  %  Survey Score 2:  %  Survey Score 3:  %    Precautions:       Subjective   Stiff this morning.  Pain reported as 0/10.      Objective          [id number]  Therapeutic Exercise -  8 minutes     Upper Body Ergometer 3'/3', level 2     OHP flexion and Abduction x 2 min each   np     Anterior shoulder stretch 3 x 30 sec L     Manual Therapy - 0 minutes  Scapular scouring  Inferior shoulder glides, grade II  Contract/relax ER  grade 3-4 inferiorn glide, MWM into abduction np  PROM flexion, abduction, external rotation np     Balance/Neuromuscular Re-Education 42 minutes     Wall walk ups RED theraband x 20 - with lift off  Repeated shoulder ADDuction behind back with towel x 20  Repeated Internal Rotation towel stretch x 20     Supine D2 flexion RED theraband x 20  Prone scapular retractions x 20 reps   np  Prone middle trap level 1 2 x 10   np  Prone  "rows 2 x 10 reps 2lb DB   np  Prone  extension 2# 2 x 10 - scapular setting  np  Prone flexion x 20  np  Prone Y x 20  np     Standing scaption 1lb DB 2 x 10 reps     Shoulder external rotation and internal rotation red TB 3 x 10 reps   np    +Passive Low load long duration External Rotation stretch 2 x 2 minutes  +Passive LLD abduction stretch 2 x 1 minute    Time Entry(in minutes):  Neuromuscular Re-Education Time Entry: 42  Therapeutic Exercise Time Entry: 8    Assessment & Plan   Assessment: Although Range of Motion remains limited, Zelda demonstrated slight improvement in active shoulder flexion and Internal Rotation behind back.  Verbal cues for scapular setting to decrease shoulder "pinching" with overhead exercises.  Pain with end range Passive Range of Motion.       The patient will continue to benefit from skilled outpatient physical therapy in order to address the deficits listed in the problem list on the initial evaluation, provide patient and family education, and maximize the patients level of independence in the home and community environments.     The patient's spiritual, cultural, and educational needs were considered, and the patient is agreeable to the plan of care and goals.           Plan: Continue POC as indicated. Progress as tolerated.    Goals:   Active       Long Term Goals       Patient will increase LUE AROM to >/= 150 deg flexion, 150 deg abduction, and 75 deg ER to improve reaching  (Progressing)       Start:  04/04/25    Expected End:  05/30/25            Patient will demonstrate symmetry with apley IR and ER to improve ability to perform IADLS (Progressing)       Start:  04/04/25    Expected End:  05/30/25            Patient will report >/= 69% function on FOTO to improve return to PLOF  (Met)       Start:  04/04/25    Expected End:  05/30/25    Resolved:  05/20/25         Patient will increase LUE strength to >/= 4/5 to improve lifting abilities  (Progressing)       Start:  " 04/04/25    Expected End:  05/30/25            - Pt will demonstrate independence with the HEP at discharge.  (Progressing)       Start:  04/04/25    Expected End:  05/30/25               Short Term Goal       Patient will increase LUE AROM to >/= 130 deg flexion, 130 deg abduction, and 50 deg ER to improve reaching  (Progressing)       Start:  04/04/25    Expected End:  05/30/25            Patient will report </= 2/10 for at worst pain to improve quality of life (Met)       Start:  04/04/25    Expected End:  05/02/25    Resolved:  05/13/25         Patient will reach thoracic spine with apley flexion ER to improve ability to fix her hair (Progressing)       Start:  04/04/25    Expected End:  05/30/25            Patient will reach lumbar spine with apley extension/IR to improve ability to doff bra (Progressing)       Start:  04/04/25    Expected End:  05/30/25            Patient will report >/= 59% function on FOTO to improve return to PLOF  (Met)       Start:  04/04/25    Expected End:  05/30/25    Resolved:  05/20/25             Kaye Araujo, PTA

## 2025-06-19 ENCOUNTER — CLINICAL SUPPORT (OUTPATIENT)
Dept: REHABILITATION | Facility: HOSPITAL | Age: 50
End: 2025-06-19
Payer: COMMERCIAL

## 2025-06-19 DIAGNOSIS — R29.3 POOR POSTURE: ICD-10-CM

## 2025-06-19 DIAGNOSIS — R29.898 DECREASED STRENGTH OF UPPER EXTREMITY: ICD-10-CM

## 2025-06-19 DIAGNOSIS — M25.612 DECREASED RANGE OF MOTION OF LEFT SHOULDER: Primary | ICD-10-CM

## 2025-06-19 PROCEDURE — 97112 NEUROMUSCULAR REEDUCATION: CPT | Mod: PN,CQ

## 2025-06-19 PROCEDURE — 97110 THERAPEUTIC EXERCISES: CPT | Mod: PN,CQ

## 2025-06-19 NOTE — PROGRESS NOTES
Outpatient Rehab    Physical Therapy Visit    Patient Name: Zelda Acevedo  MRN: [id number]  YOB: 1975  Encounter Date: 6/19/2025    Therapy Diagnosis:   Encounter Diagnoses   Name Primary?    Decreased range of motion of left shoulder Yes    Poor posture     Decreased strength of upper extremity      Physician: Kenji Maradiaga,*    Physician Orders: Eval and Treat  Medical Diagnosis: Left shoulder pain, unspecified chronicity  Other closed displaced fracture of proximal end of left humerus with routine healing, subsequent encounter  Surgical Diagnosis: Not applicable for this Episode   Surgical Date: Not applicable for this Episode  Days Since Last Surgery: Not applicable for this Episode    Visit # / Visits Authorized:  21 / 27  Insurance Authorization Period: 4/4/2025 to 12/31/2025  Date of Evaluation: 3/27/2025  Plan of Care Certification: 5/20/2025 to 8/20/2025      PT/PTA: PTA   Number of PTA visits since last PT visit:3    Time In: 1551   Time Out: 1644  Total Time (in minutes): 53   Total Billable Time (in minutes): 53    FOTO:  Intake Score:  %  Survey Score 2:  %  Survey Score 3:  %    Precautions:       Subjective   Doing well this afternoon.  Pain reported as 0/10.      Objective          [id number]  Therapeutic Exercise -  15 minutes     Upper Body Ergometer 3'/3', level 2     OHP flexion and Abduction x 2 min each   np     Anterior shoulder stretch 3 x 30 sec L  np  Repeated shoulder ADDuction behind back with towel x 20  Repeated Internal Rotation towel stretch x 20     Manual Therapy - 0 minutes  Scapular scouring  Inferior shoulder glides, grade II  Contract/relax ER  grade 3-4 inferiorn glide, MWM into abduction np  PROM flexion, abduction, external rotation np     Balance/Neuromuscular Re-Education 38 minutes     Wall walk ups RED theraband x 20 - with lift off       Supine D2 flexion RED theraband x 20  Prone scapular retractions x 20 reps   np  Prone middle trap level 1 2 x 10    np  Prone rows 2 x 10 reps 2lb DB   np  Prone  extension 2# 2 x 10 - scapular setting  np  Prone flexion x 20  np  Prone Y x 20  np     Standing scaption 1lb DB 2 x 10 reps   np  Shoulder external rotation and internal rotation red TB 3 x 10 reps   np     +Passive Low load long duration External Rotation stretch 2 x 2 minutes  +Passive LLD abduction stretch 2 x 1 minute    Time Entry(in minutes):  Neuromuscular Re-Education Time Entry: 38  Therapeutic Exercise Time Entry: 15    Assessment & Plan   Assessment: Zelda continues with active shoulder Range of Motion.  Passive External Rotation stretch was better today, but remains limited.         The patient will continue to benefit from skilled outpatient physical therapy in order to address the deficits listed in the problem list on the initial evaluation, provide patient and family education, and maximize the patients level of independence in the home and community environments.     The patient's spiritual, cultural, and educational needs were considered, and the patient is agreeable to the plan of care and goals.           Plan: Continue POC as indicated. Progress as tolerated.    Goals:   Active       Long Term Goals       Patient will increase LUE AROM to >/= 150 deg flexion, 150 deg abduction, and 75 deg ER to improve reaching  (Progressing)       Start:  04/04/25    Expected End:  05/30/25            Patient will demonstrate symmetry with apley IR and ER to improve ability to perform IADLS (Progressing)       Start:  04/04/25    Expected End:  05/30/25            Patient will report >/= 69% function on FOTO to improve return to PLOF  (Met)       Start:  04/04/25    Expected End:  05/30/25    Resolved:  05/20/25         Patient will increase LUE strength to >/= 4/5 to improve lifting abilities  (Progressing)       Start:  04/04/25    Expected End:  05/30/25            - Pt will demonstrate independence with the HEP at discharge.  (Progressing)       Start:   04/04/25    Expected End:  05/30/25               Short Term Goal       Patient will increase LUE AROM to >/= 130 deg flexion, 130 deg abduction, and 50 deg ER to improve reaching  (Progressing)       Start:  04/04/25    Expected End:  05/30/25            Patient will report </= 2/10 for at worst pain to improve quality of life (Met)       Start:  04/04/25    Expected End:  05/02/25    Resolved:  05/13/25         Patient will reach thoracic spine with apley flexion ER to improve ability to fix her hair (Progressing)       Start:  04/04/25    Expected End:  05/30/25            Patient will reach lumbar spine with apley extension/IR to improve ability to doff bra (Progressing)       Start:  04/04/25    Expected End:  05/30/25            Patient will report >/= 59% function on FOTO to improve return to PLOF  (Met)       Start:  04/04/25    Expected End:  05/30/25    Resolved:  05/20/25             Kaye Araujo, PTA

## 2025-06-24 ENCOUNTER — CLINICAL SUPPORT (OUTPATIENT)
Dept: REHABILITATION | Facility: HOSPITAL | Age: 50
End: 2025-06-24
Payer: COMMERCIAL

## 2025-06-24 DIAGNOSIS — R29.898 DECREASED STRENGTH OF UPPER EXTREMITY: ICD-10-CM

## 2025-06-24 DIAGNOSIS — R29.3 POOR POSTURE: ICD-10-CM

## 2025-06-24 DIAGNOSIS — M25.612 DECREASED RANGE OF MOTION OF LEFT SHOULDER: Primary | ICD-10-CM

## 2025-06-24 PROCEDURE — 97110 THERAPEUTIC EXERCISES: CPT | Mod: PN,CQ

## 2025-06-24 PROCEDURE — 97112 NEUROMUSCULAR REEDUCATION: CPT | Mod: PN,CQ

## 2025-06-26 ENCOUNTER — CLINICAL SUPPORT (OUTPATIENT)
Dept: REHABILITATION | Facility: HOSPITAL | Age: 50
End: 2025-06-26
Payer: COMMERCIAL

## 2025-06-26 DIAGNOSIS — R29.3 POOR POSTURE: ICD-10-CM

## 2025-06-26 DIAGNOSIS — M25.612 DECREASED RANGE OF MOTION OF LEFT SHOULDER: Primary | ICD-10-CM

## 2025-06-26 DIAGNOSIS — R29.898 DECREASED STRENGTH OF UPPER EXTREMITY: ICD-10-CM

## 2025-06-26 PROCEDURE — 97140 MANUAL THERAPY 1/> REGIONS: CPT | Mod: PN

## 2025-06-26 PROCEDURE — 97112 NEUROMUSCULAR REEDUCATION: CPT | Mod: PN

## 2025-06-26 PROCEDURE — 97530 THERAPEUTIC ACTIVITIES: CPT | Mod: PN

## 2025-06-26 PROCEDURE — 97110 THERAPEUTIC EXERCISES: CPT | Mod: PN

## 2025-06-26 NOTE — PROGRESS NOTES
Physical Therapy Progress Note : Updated Plan of Care    Patient Name: Zelda Acevedo  MRN: [id number]  YOB: 1975  Encounter Date: 6/26/2025    Therapy Diagnosis:   Encounter Diagnoses   Name Primary?    Decreased range of motion of left shoulder Yes    Poor posture     Decreased strength of upper extremity      Physician: Kenji Maradiaga,*    Physician Orders: Eval and Treat  Medical Diagnosis: Left shoulder pain, unspecified chronicity  Other closed displaced fracture of proximal end of left humerus with routine healing, subsequent encounter  Surgical Diagnosis: Not applicable for this Episode   Surgical Date: Not applicable for this Episode  Days Since Last Surgery: Not applicable for this Episode    Visit # / Visits Authorized:  23 / 27  Insurance Authorization Period: 4/4/2025 to 12/31/2025  Date of Evaluation: 3/27/2025   Plan of Care Certification: 5/20/2025 to 8/20/2025      PT/PTA: PT   Number of PTA visits since last PT visit:0  Time In: 0805   Time Out: 0905  Total Time (in minutes): 60   Total Billable Time (in minutes): 60    FOTO:  Intake Score: 49%  Survey Score 2: 53%  Survey Score 3: 75%    Precautions:       Subjective   Reports her shoulder is more stiff in the morning. Reports she doesnt really have any limitations except reaching for dishes above shouldr height. She is still worried about lifting things..  Pain reported as 0/10. L shoulder    Objective      Shoulder Range of Motion  Left Shoulder   Active (deg) Passive (deg) Pain   Flexion 150 160     Extension 50       Scaption         ABduction 110 145     ADduction         Horizontal ABduction         Horizontal ADduction         External Rotation (Shoulder ABducted 0 degrees)         External Rotation (Shoulder ABducted 45 degrees)         External Rotation (Shoulder ABducted 90 degrees) 40 60     Internal Rotation (Shoulder ABducted 0 degrees)         Internal Rotation (Shoulder ABducted 45 degrees)         Internal  Rotation (Shoulder ABducted 90 degrees) 50           Shoulder, Elbow, or Forearm Range of Motion Details: Apley extension / IR: L5 region          Shoulder Strength - Planes of Motion   Right Strength Right Pain Left Strength Left  Pain   Flexion     3-     Extension     3-     ABduction     3-     ADduction           Horizontal ABduction           Horizontal ADduction           Internal Rotation 0°     4     Internal Rotation 90°           External Rotation 0°     3-     External Rotation 90°                            Treatment:  Therapeutic Exercise  TE 1: PROM flexion, abduction, external rotation  TE 2: Repeated shoulder apley IR 20 reps  TE 3: Standing scaption 2 lb DB 3 x 10  Manual Therapy  MT 1: PA mobs thoracic spine grade 3-4  MT 2: inferior glide GH joint grade 3  MT 3: anterior glide grade 2 in prone  Balance/Neuromuscular Re-Education  NMR 1: serratus wall slides yellow TB 2 x 10  NMR 2: prone middle trap level 1 2 x 10  NMR 3: SL ER 1 lb DB 3 x 10  NMR 4: scapular clock yellow TB 10 reps LUE only  NMR 5: PNF D2 flexion yellow TB 2 x 10  Therapeutic Activity  TA 1: FOTO assessment  TA 2: ROM assessment    Time Entry(in minutes):  Manual Therapy Time Entry: 8  Neuromuscular Re-Education Time Entry: 23  Therapeutic Activity Time Entry: 8  Therapeutic Exercise Time Entry: 19    Assessment & Plan   Assessment  Patient continues to demonstrate improvement in range of motion and strength since evaluation. FOTO score demonstrates significant functional improvement since evaluation. She does continue to demonstrate limited end range of motion in all directions and she also demonstrates decreased LUE strength at 3-/5 in most directions. She is unable to resist during strength testing. She continues to have difficulty with reaching behind her back and reaching overhead for dishes. She will continue to benefit from skilled Physical Therapist services to address remaining strength and range of motion deficits to  return to PLOF and maximize functional mobility.   Evaluation/Treatment Tolerance: Patient tolerated treatment well  Plan  From a physical therapy perspective, the patient would benefit from: Skilled Rehab Services                Visit Frequency: 1 times Per Week for 4 Weeks.                     The patient will continue to benefit from skilled outpatient physical therapy in order to address the deficits listed in the problem list on the initial evaluation, provide patient and family education, and maximize the patients level of independence in the home and community environments.     The patient's spiritual, cultural, and educational needs were considered, and the patient is agreeable to the plan of care and goals.           Goals:   Active       Long Term Goals       Patient will increase LUE AROM to >/= 150 deg flexion, 150 deg abduction, and 75 deg ER to improve reaching  (Progressing)       Start:  04/04/25    Expected End:  05/30/25            Patient will demonstrate symmetry with apley IR and ER to improve ability to perform IADLS (Progressing)       Start:  04/04/25    Expected End:  05/30/25            Patient will report >/= 69% function on FOTO to improve return to PLOF  (Met)       Start:  04/04/25    Expected End:  05/30/25    Resolved:  05/20/25         Patient will increase LUE strength to >/= 4/5 to improve lifting abilities  (Progressing)       Start:  04/04/25    Expected End:  05/30/25            - Pt will demonstrate independence with the HEP at discharge.  (Progressing)       Start:  04/04/25    Expected End:  05/30/25               Short Term Goal       Patient will increase LUE AROM to >/= 130 deg flexion, 130 deg abduction, and 50 deg ER to improve reaching  (Progressing)       Start:  04/04/25    Expected End:  05/30/25            Patient will report </= 2/10 for at worst pain to improve quality of life (Met)       Start:  04/04/25    Expected End:  05/02/25    Resolved:  05/13/25          Patient will reach thoracic spine with apley flexion ER to improve ability to fix her hair (Progressing)       Start:  04/04/25    Expected End:  05/30/25            Patient will reach lumbar spine with apley extension/IR to improve ability to doff bra (Met)       Start:  04/04/25    Expected End:  05/30/25    Resolved:  06/26/25         Patient will report >/= 59% function on FOTO to improve return to PLOF  (Met)       Start:  04/04/25    Expected End:  05/30/25    Resolved:  05/20/25             Nikole Proctor, PT

## 2025-06-27 ENCOUNTER — TELEPHONE (OUTPATIENT)
Dept: FAMILY MEDICINE | Facility: CLINIC | Age: 50
End: 2025-06-27
Payer: COMMERCIAL

## 2025-06-27 NOTE — PROGRESS NOTES
SUBJECTIVE:    Patient ID: Zelda Acevedo is a 49 y.o. female.    Chief Complaint: Follow-up (No bottles//Pt is here for a follow up//Cologuard ordered//KE)      History of Present Illness    CHIEF COMPLAINT:  49 year old female presents today for follow up    MUSCULOSKELETAL:  She attends physical therapy twice weekly with prescribed home exercises for arm condition. Range of motion has improved overall, though she continues to have difficulty with behind-the-back movements. Pain occurs only with overexertion.    MEDICATIONS:  She continues Lexapro with good response and Lisinopril.    GYNECOLOGICAL HISTORY:  She experienced natural menopause more than a year ago, with periods stopping spontaneously without medical intervention.      ROS:  General: -fever, -chills, -fatigue, -weight gain, -weight loss  Eyes: -vision changes, -redness, -discharge  ENT: -ear pain, -nasal congestion, -sore throat  Cardiovascular: -chest pain, -palpitations, -lower extremity edema  Respiratory: -cough, -shortness of breath  Gastrointestinal: -abdominal pain, -nausea, -vomiting, -diarrhea, -constipation, -blood in stool  Genitourinary: -dysuria, -hematuria, -frequency  Musculoskeletal: -joint pain, -muscle pain, +pain with movement, +limited movement  Skin: -rash, -lesion  Neurological: -headache, -dizziness, -numbness, -tingling  Psychiatric: -anxiety, -depression, -sleep difficulty  Female Genitourinary: +absent or irregular periods              Patient Outreach on 04/29/2025   Component Date Value Ref Range Status    HPV DNA 04/13/2021 None Detected  None Detected Final    PAP Recommendation External 04/13/2021 Cotesting in 5 years   Final    Pap 04/13/2021 Negative for intraephithelial lesion or malignancy  Negative for intraephithelial lesion or malignancy, Other Final   Hospital Outpatient Visit on 02/24/2025   Component Date Value Ref Range Status    Sodium 02/24/2025 141  136 - 145 mmol/L Final    Potassium 02/24/2025 3.6  3.5  - 5.1 mmol/L Final    Chloride 2025 105  95 - 110 mmol/L Final    CO2 2025 27  23 - 29 mmol/L Final    Glucose 2025 110  70 - 110 mg/dL Final    BUN 2025 13  6 - 20 mg/dL Final    Creatinine 2025 0.7  0.5 - 1.4 mg/dL Final    Calcium 2025 9.5  8.7 - 10.5 mg/dL Final    Anion Gap 2025 9  8 - 16 mmol/L Final    eGFR 2025 >60  >60 mL/min/1.73 m^2 Final    QRS Duration 2025 84  ms Final    [facility] QTC Calculation 2025 442  ms Final    WBC 2025 6.90  3.90 - 12.70 K/uL Final    RBC 2025 4.27  4.00 - 5.40 M/uL Final    Hemoglobin 2025 11.9 (L)  12.0 - 16.0 g/dL Final    Hematocrit 2025 37.1  37.0 - 48.5 % Final    MCV 2025 87  82 - 98 fL Final    MCH 2025 27.9  27.0 - 31.0 pg Final    MCHC 2025 32.1  32.0 - 36.0 g/dL Final    RDW 2025 12.0  11.5 - 14.5 % Final    Platelets 2025 273  150 - 450 K/uL Final    MPV 2025 8.8 (L)  9.2 - 12.9 fL Final    Immature Granulocytes 2025 0.4  0.0 - 0.5 % Final    Gran # (ANC) 2025 3.8  1.8 - 7.7 K/uL Final    Immature Grans (Abs) 2025 0.03  0.00 - 0.04 K/uL Final    Lymph # 2025 1.9  1.0 - 4.8 K/uL Final    Mono # 2025 0.5  0.3 - 1.0 K/uL Final    Eos # 2025 0.5  0.0 - 0.5 K/uL Final    Baso # 2025 0.09  0.00 - 0.20 K/uL Final    nRBC 2025 0  0 /100 WBC Final    Gran % 2025 55.7  38.0 - 73.0 % Final    Lymph % 2025 27.4  18.0 - 48.0 % Final    Mono % 2025 7.4  4.0 - 15.0 % Final    Eosinophil % 2025 7.8  0.0 - 8.0 % Final    Basophil % 2025 1.3  0.0 - 1.9 % Final    Differential Method 2025 Automated   Final       Past Medical History:   Diagnosis Date    Anxiety disorder, unspecified     Hypertension     Mixed hyperlipidemia      Past Surgical History:   Procedure Laterality Date     SECTION       SECTION      OPEN REDUCTION AND INTERNAL FIXATION (ORIF) OF FRACTURE OF  PROXIMAL HUMERUS Left 2/28/2025    Procedure: ORIF, FRACTURE, HUMERUS, PROXIMAL;  Surgeon: Kenji Maradiaga MD;  [facility];  Service: Orthopedics;  Laterality: Left;  monroe- prox hum plate  [place] NOTIFIED 2/25/25 ARK    TONSILLECTOMY       Family History   Problem Relation Name Age of Onset    Arthritis Mother Emilie Gomez     Arthritis Father Albert Gomez     Cancer Father Albert Gomez         Melnoma cancer of the eye    Hearing loss Father Albert Gomez     Cancer Sister Ana Paula         Skin cancer       All of your core healthy metrics are met.      The 10-year CVD risk score (Yolie, et al., 2008) is: 5%    Values used to calculate the score:      Age: 49 years      Sex: Female      Diabetic: No      Tobacco smoker: No      Systolic Blood Pressure: 126 mmHg      Is BP treated: Yes      HDL Cholesterol: 45 mg/dL      Total Cholesterol: 173 mg/dL     Marital Status:   Alcohol History:  reports current alcohol use of about 1.0 standard drink of alcohol per week.  Tobacco History:  reports that she has never smoked. She has never been exposed to tobacco smoke. She has never used smokeless tobacco.  Drug History:  reports no history of drug use.    Health Maintenance Topics with due status: Not Due       Topic Last Completion Date    Cervical Cancer Screening 04/13/2021    Lipid Panel 12/16/2024    Mammogram 01/30/2025    TETANUS VACCINE 02/19/2025    RSV Vaccine (Age 60+ and Pregnant patients) Not Due     Immunization History   Administered Date(s) Administered    COVID-19 MRNA, LN-S PF (MODERNA HALF 0.25 ML DOSE) 01/31/2022    COVID-19, MRNA, LN-S, PF (MODERNA FULL 0.5 ML DOSE) 01/11/2021, 02/12/2021    Influenza (FLUBLOK) - Quadrivalent - Recombinant - PF *Preferred* (egg allergy) 12/15/2021    Influenza - Quadrivalent - MDCK - PF 09/17/2022    Influenza - Trivalent - Fluarix, Flulaval, Fluzone, Afluria - PF 12/18/2024    Tdap 02/19/2025       Review of patient's allergies  "indicates:  No Known Allergies  Current Medications[1]        Objective:      Vitals:    06/17/25 1032   BP: 126/84   Pulse: 88   SpO2: 98%   Weight: 88.2 kg (194 lb 6.4 oz)   Height: 5' 1" (1.549 m)       Physical Exam  Vitals and nursing note reviewed.   Constitutional:       General: She is not in acute distress.     Appearance: Normal appearance. She is well-developed. She is obese.   HENT:      Head: Normocephalic.      Right Ear: External ear normal.      Left Ear: External ear normal.   Eyes:      Conjunctiva/sclera: Conjunctivae normal.      Pupils: Pupils are equal, round, and reactive to light.   Neck:      Vascular: No JVD.   Cardiovascular:      Rate and Rhythm: Normal rate and regular rhythm.      Heart sounds: No murmur heard.  Pulmonary:      Effort: Pulmonary effort is normal.      Breath sounds: Normal breath sounds.   Abdominal:      General: Bowel sounds are normal.      Palpations: Abdomen is soft.   Musculoskeletal:         General: No deformity.      Left shoulder: Decreased range of motion. Normal strength.      Cervical back: Normal range of motion and neck supple.   Lymphadenopathy:      Cervical: No cervical adenopathy.   Skin:     General: Skin is warm and dry.      Findings: No rash.   Neurological:      Mental Status: She is alert and oriented to person, place, and time.      Gait: Gait normal.   Psychiatric:         Speech: Speech normal.         Behavior: Behavior normal.          Assessment:       1. Screening for colon cancer    2. Hyperlipidemia, unspecified hyperlipidemia type    3. Hypertension, unspecified type    4. Depression, unspecified depression type    5. Morbid obesity    6. History of open reduction and internal fixation (ORIF) procedure    7. Decreased range of motion of left shoulder           Assessment & Plan    - Reviewed current medications (Lexapro and Lisinopril).  - BP is well-controlled.  - Considered menopausal status.  - Assessed progress of arm " rehabilitation post-op.    HYPERTENSION:  - Blood pressure is well-controlled and within normal limits today.  - Continue Lisinopril for hypertension management.  - Ordered routine labs including renal function and lipid panel.    DEPRESSION:  - Zelda reports feeling good overall with improved mood.  - Continue Lexapro for depression management.    MENOPAUSE:  - Zelda has been amenorrheic for more than 1 year, with menopause occurring naturally without surgical intervention such as endometrial ablation.  - Ordered routine labs including thyroid function tests.  - Referred the patient to [facility] for GYN evaluation.    JOINT PAIN:  - Zelda reports improvement in range of motion and pain management, with pain mostly occurring with overexertion.  - Currently attending physical therapy twice weekly and performing prescribed home exercises.    FOLLOW-UP:  - Zelda to complete all ordered labs (including liver tests, renal function, lipid panel, and thyroid function tests) at their convenience.  - Results will be sent to patient once completed.        Plan:       1. Screening for colon cancer  Comments:  cologuard ordered  Orders:  -     Cologuard Screening (Multitarget Stool DNA); Future; Expected date: 06/17/2025    2. Hyperlipidemia, unspecified hyperlipidemia type  Comments:  crestor    3. Hypertension, unspecified type  Comments:  continue lisinopril  Orders:  -     lisinopriL 10 MG tablet; Take 1 tablet (10 mg total) by mouth once daily.  Dispense: 90 tablet; Refill: 3    4. Depression, unspecified depression type  Comments:  continue lexapro. depression is controlled.   Orders:  -     EScitalopram oxalate (LEXAPRO) 20 MG tablet; Take 1 tablet (20 mg total) by mouth once daily.  Dispense: 90 tablet; Refill: 3    5. Morbid obesity    6. History of open reduction and internal fixation (ORIF) procedure  Comments:  still in pt    7. Decreased range of motion of left shoulder  Comments:  improving      Follow  up in about 6 months (around 12/17/2025), or if symptoms worsen or fail to improve, for medication management.          Counseled on age and gender appropriate medical preventative services, including cancer screenings, immunizations, overall nutritional health, need for a consistent exercise regimen and an overall push towards maintaining a vigorous and active lifestyle.          6/27/2025 Renetta Bunch NP    Answers submitted by the patient for this visit:  Review of Systems Questionnaire (Submitted on 6/16/2025)  activity change: No  unexpected weight change: No  neck pain: No  hearing loss: No  rhinorrhea: No  trouble swallowing: No  eye discharge: No  visual disturbance: No  chest tightness: No  wheezing: No  chest pain: No  palpitations: No  blood in stool: No  constipation: No  vomiting: No  diarrhea: No  polydipsia: No  polyuria: No  difficulty urinating: No  hematuria: No  menstrual problem: No  dysuria: No  joint swelling: No  arthralgias: No  headaches: No  weakness: No  confusion: No  dysphoric mood: No         [1]   Current Outpatient Medications:     calcium-vitamin D3-vitamin K 500 mg-1,000 unit-40 mcg Chew, Take by mouth once., Disp: , Rfl:     cetirizine (ZYRTEC) 10 MG tablet, Take 10 mg by mouth once daily., Disp: , Rfl:     rosuvastatin (CRESTOR) 20 MG tablet, Take 1 tablet (20 mg total) by mouth once daily., Disp: 90 tablet, Rfl: 3    triamcinolone acetonide 0.1% (KENALOG) 0.1 % cream, Apply topically 2 (two) times daily., Disp: 15 g, Rfl: 0    UNABLE TO FIND, medication name: Danilo Women's Multi Gummy, Disp: , Rfl:     valACYclovir (VALTREX) 1000 MG tablet, Take 2 tablets (2,000 mg total) by mouth every 12 (twelve) hours., Disp: 20 tablet, Rfl: 0    EScitalopram oxalate (LEXAPRO) 20 MG tablet, Take 1 tablet (20 mg total) by mouth once daily., Disp: 90 tablet, Rfl: 3    lisinopriL 10 MG tablet, Take 1 tablet (10 mg total) by mouth once daily., Disp: 90 tablet, Rfl: 3

## 2025-07-08 ENCOUNTER — CLINICAL SUPPORT (OUTPATIENT)
Dept: REHABILITATION | Facility: HOSPITAL | Age: 50
End: 2025-07-08
Payer: COMMERCIAL

## 2025-07-08 DIAGNOSIS — M25.612 DECREASED RANGE OF MOTION OF LEFT SHOULDER: Primary | ICD-10-CM

## 2025-07-08 DIAGNOSIS — R29.898 DECREASED STRENGTH OF UPPER EXTREMITY: ICD-10-CM

## 2025-07-08 DIAGNOSIS — R29.3 POOR POSTURE: ICD-10-CM

## 2025-07-08 PROCEDURE — 97110 THERAPEUTIC EXERCISES: CPT | Mod: PN

## 2025-07-08 PROCEDURE — 97112 NEUROMUSCULAR REEDUCATION: CPT | Mod: PN

## 2025-07-08 NOTE — PROGRESS NOTES
Physical Therapy Visit    Patient Name: Zelda Acevedo  MRN: [id number]  YOB: 1975  Encounter Date: 7/8/2025    Therapy Diagnosis:   Encounter Diagnoses   Name Primary?    Decreased range of motion of left shoulder Yes    Poor posture     Decreased strength of upper extremity      Physician: Kenji Maradiaga,*    Physician Orders: Eval and Treat  Medical Diagnosis: Left shoulder pain, unspecified chronicity  Other closed displaced fracture of proximal end of left humerus with routine healing, subsequent encounter  Surgical Diagnosis: Not applicable for this Episode   Surgical Date: Not applicable for this Episode  Days Since Last Surgery: Not applicable for this Episode    Visit # / Visits Authorized:  24 / 27  Insurance Authorization Period: 4/4/2025 to 12/31/2025  Date of Evaluation: 3/27/2025  Plan of Care Certification: 6/26/2025 to 8/26/2025      PT/PTA: PT   Number of PTA visits since last PT visit:0  Time In: 0808   Time Out: 0900  Total Time (in minutes): 52   Total Billable Time (in minutes): 52    FOTO:  Intake Score: 49%  Survey Score 2: 53%  Survey Score 3: 75%    Precautions:       Subjective   Reports she noticed some pain yesterday when her daughter was waxing under her arm. She noticed increased stiffness yesterday as well. She continues to have difficulty reaching behind the back..  Pain reported as 0/10. L shoulder    Objective            Treatment:  Therapeutic Exercise  TE 1: PROM flexion, abduction, external rotation  TE 2: Shoulder adduction behind the back 2 x 10  TE 3: repeated shoulder extension on elevated mat 2 x 10  TE 5: repeated IR towel stretch 15 reps  TE 6: sleeper stretch 10 x 10 sec  TE 7: chest press 10 lbs 20 reps  Manual Therapy  MT 2: inferior glide GH joint grade 3  Balance/Neuromuscular Re-Education  NMR 1: serratus wall slides red TB 2 x 10  NMR 2: prone shoulder abduction 2 x 10  NMR 3: SL ER 1 lb DB 3 x 10  NMR 4: scapular clock red TB 10 reps LUE  only  NMR 6: prone Y shoulder flexion 2 x 10  NMR 7: prone shoulder extension ER 1 lb DB 2 x 10    Time Entry(in minutes):  Manual Therapy Time Entry: 6  Neuromuscular Re-Education Time Entry: 23  Therapeutic Exercise Time Entry: 23    Assessment & Plan   Assessment: Patient arrives with increased L shoulder stiffness. PROM performed with improvement in range of motion. She demonstrates improvement in ability to reach behind her back reaching about T11. She continues to demonstrate limited ER and end range abduction and flexion. She was able to progress prone exercises today with good tolerance.        The patient will continue to benefit from skilled outpatient physical therapy in order to address the deficits listed in the problem list on the initial evaluation, provide patient and family education, and maximize the patients level of independence in the home and community environments.     The patient's spiritual, cultural, and educational needs were considered, and the patient is agreeable to the plan of care and goals.           Plan: Continue POC as indicated. Progress as tolerated.    Goals:   Active       Long Term Goals       Patient will increase LUE AROM to >/= 150 deg flexion, 150 deg abduction, and 75 deg ER to improve reaching  (Progressing)       Start:  04/04/25    Expected End:  05/30/25            Patient will demonstrate symmetry with apley IR and ER to improve ability to perform IADLS (Progressing)       Start:  04/04/25    Expected End:  05/30/25            Patient will report >/= 69% function on FOTO to improve return to PLOF  (Met)       Start:  04/04/25    Expected End:  05/30/25    Resolved:  05/20/25         Patient will increase LUE strength to >/= 4/5 to improve lifting abilities  (Progressing)       Start:  04/04/25    Expected End:  05/30/25            - Pt will demonstrate independence with the HEP at discharge.  (Progressing)       Start:  04/04/25    Expected End:  05/30/25                Short Term Goal       Patient will increase LUE AROM to >/= 130 deg flexion, 130 deg abduction, and 50 deg ER to improve reaching  (Progressing)       Start:  04/04/25    Expected End:  05/30/25            Patient will report </= 2/10 for at worst pain to improve quality of life (Met)       Start:  04/04/25    Expected End:  05/02/25    Resolved:  05/13/25         Patient will reach thoracic spine with apley flexion ER to improve ability to fix her hair (Progressing)       Start:  04/04/25    Expected End:  05/30/25            Patient will reach lumbar spine with apley extension/IR to improve ability to doff bra (Met)       Start:  04/04/25    Expected End:  05/30/25    Resolved:  06/26/25         Patient will report >/= 59% function on FOTO to improve return to PLOF  (Met)       Start:  04/04/25    Expected End:  05/30/25    Resolved:  05/20/25             Nikole Proctor, PT

## 2025-07-22 ENCOUNTER — CLINICAL SUPPORT (OUTPATIENT)
Dept: REHABILITATION | Facility: HOSPITAL | Age: 50
End: 2025-07-22
Payer: COMMERCIAL

## 2025-07-22 DIAGNOSIS — M25.612 DECREASED RANGE OF MOTION OF LEFT SHOULDER: Primary | ICD-10-CM

## 2025-07-22 DIAGNOSIS — R29.898 DECREASED STRENGTH OF UPPER EXTREMITY: ICD-10-CM

## 2025-07-22 DIAGNOSIS — R29.3 POOR POSTURE: ICD-10-CM

## 2025-07-22 PROCEDURE — 97112 NEUROMUSCULAR REEDUCATION: CPT | Mod: PN,CQ

## 2025-07-22 PROCEDURE — 97530 THERAPEUTIC ACTIVITIES: CPT | Mod: PN,CQ

## 2025-07-22 NOTE — PROGRESS NOTES
Outpatient Rehab    Physical Therapy Visit    Patient Name: Zelda Acevedo  MRN: [id number]  YOB: 1975  Encounter Date: 7/22/2025    Therapy Diagnosis:   Encounter Diagnoses   Name Primary?    Decreased range of motion of left shoulder Yes    Poor posture     Decreased strength of upper extremity      Physician: Kenji Maradiaga,*    Physician Orders: Eval and Treat  Medical Diagnosis: Left shoulder pain, unspecified chronicity  Other closed displaced fracture of proximal end of left humerus with routine healing, subsequent encounter  Surgical Diagnosis: Not applicable for this Episode   Surgical Date: Not applicable for this Episode  Days Since Last Surgery: Not applicable for this Episode    Visit # / Visits Authorized:  25 / 27  Insurance Authorization Period: 4/4/2025 to 12/31/2025  Date of Evaluation: 3/27/2025  Plan of Care Certification: 6/26/2025 to 8/26/2025      PT/PTA: PTA   Number of PTA visits since last PT visit:1  Time In: 0700   Time Out: 0753  Total Time (in minutes): 53   Total Billable Time (in minutes): 53    FOTO:  Intake Score (%): 49  Survey Score 2 (%): 53  Survey Score 3 (%): 75    Precautions:         Subjective   I haven't done anything in a week, pt was out of town.  Pain reported as 0/10.      Objective          Rehab tech assisted with treatment     Therex x  23 minutes  TE 2: Shoulder adduction behind the back 2 x 10  TE 3: repeated shoulder extension on elevated mat 2 x 10  TE 5: repeated IR towel stretch 15 reps    TE 6: sleeper stretch 10 x 10 sec  TE 7: chest press 10 lbs 30 reps    Manual Therapy   np  MT 2: inferior glide GH joint grade 3    Balance/Neuromuscular Re-Education x 30 minutes  NMR 1: serratus wall slides red TB 2 x 10  NMR 4: scapular clock red TB 10 reps LUE only    NMR 2: prone shoulder abduction 2 x 10  NMR 6: prone Y shoulder flexion 2 x 10  NMR 7: prone shoulder extension ER 1 lb DB 2 x 10  NMR 3: SL ER 1 lb DB 3 x 10    Time Entry(in  minutes):  Neuromuscular Re-Education Time Entry: 23  Therapeutic Activity Time Entry: 30    Assessment & Plan   Assessment: Zelda reports not performing HEP while out of town.  She demonstrates improvement in overhead shoulder flexion today.   Making good progress.       The patient will continue to benefit from skilled outpatient physical therapy in order to address the deficits listed in the problem list on the initial evaluation, provide patient and family education, and maximize the patients level of independence in the home and community environments.     The patient's spiritual, cultural, and educational needs were considered, and the patient is agreeable to the plan of care and goals.           Plan: Continue POC as indicated. Progress as tolerated.    Goals:   Active       Long Term Goals       Patient will increase LUE AROM to >/= 150 deg flexion, 150 deg abduction, and 75 deg ER to improve reaching  (Progressing)       Start:  04/04/25    Expected End:  05/30/25            Patient will demonstrate symmetry with apley IR and ER to improve ability to perform IADLS (Progressing)       Start:  04/04/25    Expected End:  05/30/25            Patient will report >/= 69% function on FOTO to improve return to PLOF  (Met)       Start:  04/04/25    Expected End:  05/30/25    Resolved:  05/20/25         Patient will increase LUE strength to >/= 4/5 to improve lifting abilities  (Progressing)       Start:  04/04/25    Expected End:  05/30/25            - Pt will demonstrate independence with the HEP at discharge.  (Progressing)       Start:  04/04/25    Expected End:  05/30/25               Short Term Goal       Patient will increase LUE AROM to >/= 130 deg flexion, 130 deg abduction, and 50 deg ER to improve reaching  (Progressing)       Start:  04/04/25    Expected End:  05/30/25            Patient will report </= 2/10 for at worst pain to improve quality of life (Met)       Start:  04/04/25    Expected End:   05/02/25    Resolved:  05/13/25         Patient will reach thoracic spine with apley flexion ER to improve ability to fix her hair (Progressing)       Start:  04/04/25    Expected End:  05/30/25            Patient will reach lumbar spine with apley extension/IR to improve ability to doff bra (Met)       Start:  04/04/25    Expected End:  05/30/25    Resolved:  06/26/25         Patient will report >/= 59% function on FOTO to improve return to PLOF  (Met)       Start:  04/04/25    Expected End:  05/30/25    Resolved:  05/20/25             Kaye Araujo, PTA

## 2025-07-29 ENCOUNTER — CLINICAL SUPPORT (OUTPATIENT)
Dept: REHABILITATION | Facility: HOSPITAL | Age: 50
End: 2025-07-29
Payer: COMMERCIAL

## 2025-07-29 DIAGNOSIS — M25.612 DECREASED RANGE OF MOTION OF LEFT SHOULDER: Primary | ICD-10-CM

## 2025-07-29 DIAGNOSIS — R29.3 POOR POSTURE: ICD-10-CM

## 2025-07-29 DIAGNOSIS — R29.898 DECREASED STRENGTH OF UPPER EXTREMITY: ICD-10-CM

## 2025-07-29 PROCEDURE — 97530 THERAPEUTIC ACTIVITIES: CPT | Mod: PN

## 2025-07-29 PROCEDURE — 97110 THERAPEUTIC EXERCISES: CPT | Mod: PN

## 2025-07-29 PROCEDURE — 97112 NEUROMUSCULAR REEDUCATION: CPT | Mod: PN

## 2025-07-29 NOTE — PROGRESS NOTES
Physical Therapy Discharge    Patient Name: Zelda Acevedo  MRN: [id number]  YOB: 1975  Encounter Date: 7/29/2025    Therapy Diagnosis:   Encounter Diagnoses   Name Primary?    Decreased range of motion of left shoulder Yes    Poor posture     Decreased strength of upper extremity      Physician: Kenji Maradiaga,*    Physician Orders: Eval and Treat  Medical Diagnosis: Left shoulder pain, unspecified chronicity  Other closed displaced fracture of proximal end of left humerus with routine healing, subsequent encounter  Surgical Diagnosis: Not applicable for this Episode   Surgical Date: Not applicable for this Episode  Days Since Last Surgery: Not applicable for this Episode    Visit # / Visits Authorized:  26 / 27  Insurance Authorization Period: 4/4/2025 to 12/31/2025  Date of Evaluation: 3/27/2025  Plan of Care Certification: 6/26/2025 to 8/26/2025      PT/PTA:     Number of PTA visits since last PT visit:   Time In: 0806   Time Out: 0900  Total Time (in minutes): 54   Total Billable Time (in minutes): 54    FOTO:  Intake Score (%): 49  Survey Score 2 (%): 53  Survey Score 3 (%): 91    Precautions:       Subjective   She feels like she is ready for discharge. She denies functional limitations..  Pain reported as 0/10. L shoulder    Objective      Shoulder Range of Motion  Left Shoulder   Active (deg) Passive (deg) Pain   Flexion 150       Extension         Scaption         ABduction 115 130     ADduction         Horizontal ABduction         Horizontal ADduction         External Rotation (Shoulder ABducted 0 degrees)         External Rotation (Shoulder ABducted 45 degrees)         External Rotation (Shoulder ABducted 90 degrees) 50 70     Internal Rotation (Shoulder ABducted 0 degrees)         Internal Rotation (Shoulder ABducted 45 degrees)         Internal Rotation (Shoulder ABducted 90 degrees) 60           Shoulder, Elbow, or Forearm Range of Motion Details: Apley scratch : extension/IR T10,  flexion/ER: T2         Shoulder Strength - Planes of Motion   Right Strength Right Pain Left Strength Left  Pain   Flexion     3+     Extension     4     ABduction     3+     ADduction           Horizontal ABduction           Horizontal ADduction           Internal Rotation 0°     4     Internal Rotation 90°           External Rotation 0°     3-     External Rotation 90°                            Treatment:  Therapeutic Exercise  TE 1: Shoulder flexion, extension, ER, and IR: red TB 2 x 10  TE 6: sleeper stretch 3 x 30 sec  TE 8: SL ER 2 x 10  Balance/Neuromuscular Re-Education  NMR 1: serratus wall slides 2 x 10  NMR 2: prone shoulder flexion, abduction and extension 2 x 10  NMR 3: SL ER 2 x 10  NMR 4: no money red TB 2 x 10  NMR 5: scapular clocks red TB 10 reps  Therapeutic Activity  TA 1: FOTO assessment  TA 2: ROM assessment  TA 3: education on HEP and discharge    Time Entry(in minutes):  Neuromuscular Re-Education Time Entry: 23  Therapeutic Activity Time Entry: 8  Therapeutic Exercise Time Entry: 23    Assessment & Plan   Assessment: Patient is being discharged from skilled Physical Therapist services at this time as she has reached maximum therapy potential. She denies functional limitations at this time. She reports 91/100 on FOTO score questionnaire exceeding expected discharge FOTO score. She demonstrates improvement in L shoulder P/AROM, strength and posture since evaluation. She continues to demonstrate limited shoulder abduction range of motion and decreased L UE strength, however, this is expected to continue to improve with compliance with HEP. She is independent with HEP and is expected to meet unmet goals with continuation of HEP.   Evaluation/Treatment Tolerance: Patient tolerated treatment well    The patient's spiritual, cultural, and educational needs were considered, and the patient is agreeable to the plan of care and goals.     Education  Education was done with Patient. The patient's  learning style includes Demonstration and Listening. The patient Demonstrates understanding and Verbalizes understanding.         HEP for discharge.        Plan: Discharge from skilled PT services at this time with HEP.    Goals:   Active       Long Term Goals       Patient will increase LUE AROM to >/= 150 deg flexion, 150 deg abduction, and 75 deg ER to improve reaching  (Adequate for Care Transition)       Start:  04/04/25    Expected End:  05/30/25            Patient will demonstrate symmetry with apley IR and ER to improve ability to perform IADLS (Progressing)       Start:  04/04/25    Expected End:  05/30/25            Patient will report >/= 69% function on FOTO to improve return to PLOF  (Met)       Start:  04/04/25    Expected End:  05/30/25    Resolved:  05/20/25         Patient will increase LUE strength to >/= 4/5 to improve lifting abilities  (Adequate for Care Transition)       Start:  04/04/25    Expected End:  05/30/25            - Pt will demonstrate independence with the HEP at discharge.  (Met)       Start:  04/04/25    Expected End:  05/30/25    Resolved:  07/29/25            Short Term Goal       Patient will increase LUE AROM to >/= 130 deg flexion, 130 deg abduction, and 50 deg ER to improve reaching  (Adequate for Care Transition)       Start:  04/04/25    Expected End:  05/30/25            Patient will report </= 2/10 for at worst pain to improve quality of life (Met)       Start:  04/04/25    Expected End:  05/02/25    Resolved:  05/13/25         Patient will reach thoracic spine with apley flexion ER to improve ability to fix her hair (Met)       Start:  04/04/25    Expected End:  05/30/25    Resolved:  07/29/25         Patient will reach lumbar spine with apley extension/IR to improve ability to doff bra (Met)       Start:  04/04/25    Expected End:  05/30/25    Resolved:  06/26/25         Patient will report >/= 59% function on FOTO to improve return to PLOF  (Met)       Start:   04/04/25    Expected End:  05/30/25    Resolved:  05/20/25             Nikole Proctor, PT

## 2025-08-01 DIAGNOSIS — S42.292D OTHER CLOSED DISPLACED FRACTURE OF PROXIMAL END OF LEFT HUMERUS WITH ROUTINE HEALING, SUBSEQUENT ENCOUNTER: Primary | ICD-10-CM

## 2025-08-07 ENCOUNTER — OFFICE VISIT (OUTPATIENT)
Dept: ORTHOPEDICS | Facility: CLINIC | Age: 50
End: 2025-08-07
Payer: COMMERCIAL

## 2025-08-07 ENCOUNTER — HOSPITAL ENCOUNTER (OUTPATIENT)
Dept: RADIOLOGY | Facility: HOSPITAL | Age: 50
Discharge: HOME | End: 2025-08-07
Attending: ORTHOPAEDIC SURGERY
Payer: COMMERCIAL

## 2025-08-07 VITALS — BODY MASS INDEX: 36.71 KG/M2 | RESPIRATION RATE: 18 BRPM | HEIGHT: 61 IN | WEIGHT: 194.44 LBS

## 2025-08-07 DIAGNOSIS — S42.292D OTHER CLOSED DISPLACED FRACTURE OF PROXIMAL END OF LEFT HUMERUS WITH ROUTINE HEALING, SUBSEQUENT ENCOUNTER: Primary | ICD-10-CM

## 2025-08-07 DIAGNOSIS — S42.292D OTHER CLOSED DISPLACED FRACTURE OF PROXIMAL END OF LEFT HUMERUS WITH ROUTINE HEALING, SUBSEQUENT ENCOUNTER: ICD-10-CM

## 2025-08-07 PROCEDURE — 99999 PR PBB SHADOW E&M-EST. PATIENT-LVL III: CPT | Mod: PBBFAC,,, | Performed by: ORTHOPAEDIC SURGERY

## 2025-08-07 PROCEDURE — 99213 OFFICE O/P EST LOW 20 MIN: CPT | Mod: S$GLB,,, | Performed by: ORTHOPAEDIC SURGERY

## 2025-08-07 PROCEDURE — 3066F NEPHROPATHY DOC TX: CPT | Mod: CPTII,S$GLB,, | Performed by: ORTHOPAEDIC SURGERY

## 2025-08-07 PROCEDURE — 3061F NEG MICROALBUMINURIA REV: CPT | Mod: CPTII,S$GLB,, | Performed by: ORTHOPAEDIC SURGERY

## 2025-08-07 PROCEDURE — 73030 X-RAY EXAM OF SHOULDER: CPT | Mod: 26,LT,, | Performed by: RADIOLOGY

## 2025-08-07 PROCEDURE — 73030 X-RAY EXAM OF SHOULDER: CPT | Mod: TC,PO,LT

## 2025-08-07 PROCEDURE — 3008F BODY MASS INDEX DOCD: CPT | Mod: CPTII,S$GLB,, | Performed by: ORTHOPAEDIC SURGERY

## 2025-08-07 PROCEDURE — 4010F ACE/ARB THERAPY RXD/TAKEN: CPT | Mod: CPTII,S$GLB,, | Performed by: ORTHOPAEDIC SURGERY

## 2025-08-07 PROCEDURE — 1159F MED LIST DOCD IN RCRD: CPT | Mod: CPTII,S$GLB,, | Performed by: ORTHOPAEDIC SURGERY

## 2025-08-07 NOTE — PROGRESS NOTES
Past Medical History:   Diagnosis Date    Anxiety disorder, unspecified     Hypertension     Mixed hyperlipidemia        Past Surgical History:   Procedure Laterality Date     SECTION       SECTION      OPEN REDUCTION AND INTERNAL FIXATION (ORIF) OF FRACTURE OF PROXIMAL HUMERUS Left 2025    Procedure: ORIF, FRACTURE, HUMERUS, PROXIMAL;  Surgeon: Kenji Maradiaga MD;  Location: [facility];  Service: Orthopedics;  Laterality: Left;  monroe- prox hum plate  [place] NOTIFIED 25 ARK    TONSILLECTOMY         Current Outpatient Medications   Medication Sig    calcium-vitamin D3-vitamin K 500 mg-1,000 unit-40 mcg Chew Take by mouth once.    cetirizine (ZYRTEC) 10 MG tablet Take 10 mg by mouth once daily.    EScitalopram oxalate (LEXAPRO) 20 MG tablet Take 1 tablet (20 mg total) by mouth once daily.    lisinopriL 10 MG tablet Take 1 tablet (10 mg total) by mouth once daily.    rosuvastatin (CRESTOR) 20 MG tablet Take 1 tablet (20 mg total) by mouth once daily.    triamcinolone acetonide 0.1% (KENALOG) 0.1 % cream Apply topically 2 (two) times daily.    UNABLE TO FIND medication name: Danilo Women's Multi Gummy    valACYclovir (VALTREX) 1000 MG tablet Take 2 tablets (2,000 mg total) by mouth every 12 (twelve) hours.     No current facility-administered medications for this visit.       Review of patient's allergies indicates:  No Known Allergies    Family History   Problem Relation Name Age of Onset    Arthritis Mother Emilie Gomez     Arthritis Father Albert Gomez     Cancer Father Albert Gomez         Melnoma cancer of the eye    Hearing loss Father Albert Gomez     Cancer Sister Ana Paula         Skin cancer       Social History[1]    Chief Complaint:   No chief complaint on file.      Date of surgery:  2025    History of present illness:  49-year-old female underwent open reduction internal fixation of a proximal humerus fracture.  She is doing well.  She is finished with physical  therapy.  She continued to work it on her own.  Only real trouble is reaching far up her back.    Review of Systems:    Musculoskeletal:  See HPI        Physical Examination:    Vital Signs:    There were no vitals filed for this visit.      There is no height or weight on file to calculate BMI.    This a well-developed, well nourished patient in no acute distress.  They are alert and oriented and cooperative to examination.  Pt. walks without an antalgic gait.      Examination left shoulder shows well-healed surgical incision.  No erythema drainage.  Neurovascularly intact.  No significant bruising or swelling.  Forward flexion of about 175° with external rotation of 80°.  Internal rotation to about T10.    X-rays:  X-rays left shoulder ordered and reviewed which show well-aligned proximal humerus fracture with 2 screws in place     Assessment::  Status post open reduction internal fixation of left proximal humerus fracture    Plan:  I reviewed the x-ray with her today.  Continue the home stretches..  Follow up as needed.      This note was created using [employer] voice recognition software that occasionally misinterpreted phrases or words.                 [1]   Social History  Socioeconomic History    Marital status:    Tobacco Use    Smoking status: Never     Passive exposure: Never    Smokeless tobacco: Never   Substance and Sexual Activity    Alcohol use: Yes     Alcohol/week: 1.0 standard drink of alcohol     Types: 1 Unspecified drink type per week     Comment: This may only be one to two times a month    Drug use: Never    Sexual activity: Not Currently     Partners: Male     Birth control/protection: Abstinence     Social Drivers of Health     Financial Resource Strain: Low Risk  (6/18/2024)    Overall Financial Resource Strain (CARDIA)     Difficulty of Paying Living Expenses: Not hard at all   Food Insecurity: No Food Insecurity (6/18/2024)    Hunger Vital Sign     Worried About Running Out of Food  in the Last Year: Never true     Ran Out of Food in the Last Year: Never true   Transportation Needs: No Transportation Needs (12/22/2022)    PRAPARE - Transportation     Lack of Transportation (Medical): No     Lack of Transportation (Non-Medical): No   Physical Activity: Inactive (6/18/2024)    Exercise Vital Sign     Days of Exercise per Week: 0 days     Minutes of Exercise per Session: 0 min   Stress: No Stress Concern Present (6/18/2024)    [employer] - Occupational Stress Questionnaire     Feeling of Stress : Only a little   Housing Stability: Low Risk  (12/22/2022)    Housing Stability Vital Sign     Unable to Pay for Housing in the Last Year: No     Number of Places Lived in the Last Year: 1     Unstable Housing in the Last Year: No

## (undated) DEVICE — GOWN POLY REINF X-LONG XL

## (undated) DEVICE — SPONGE BULKEE II ABSRB 6X6.75

## (undated) DEVICE — ALCOHOL RUBBING 70% ISO 4OZ

## (undated) DEVICE — DRAPE STERI INSTRUMENT 1018

## (undated) DEVICE — BANDAGE MATRIX HK LOOP 6IN 5YD

## (undated) DEVICE — WRAP SHLDR HIP ACCU THRM PACK

## (undated) DEVICE — SUT X424H ETHIBOND 0-0

## (undated) DEVICE — SUT STRATAFIX SPIRAL VIOLET

## (undated) DEVICE — DRESSING GAUZE OIL EMUL 3X8

## (undated) DEVICE — PACK SIRUS BASIC V SURG STRL

## (undated) DEVICE — DRESSING MEPILEX 4X12IN

## (undated) DEVICE — GLOVE SENSICARE PI GRN 6

## (undated) DEVICE — COVER PROXIMA MAYO STAND

## (undated) DEVICE — TOWEL OR DISP STRL BLUE 4/PK

## (undated) DEVICE — DRAPE INCISE IOBAN 2 23X17IN

## (undated) DEVICE — DRAPE C-ARMOR EQUIPMENT COVER

## (undated) DEVICE — IMMOBILIZER SHOULDER ABD MED

## (undated) DEVICE — GLOVE SENSICARE PI ALOE 6.5

## (undated) DEVICE — GLOVE SENSICARE PI ALOE 6

## (undated) DEVICE — DRAPE ORTH SPLIT 77X108IN

## (undated) DEVICE — ELECTRODE MEGADYNE RETURN DUAL

## (undated) DEVICE — YANKAUER OPEN TIP W/O VENT

## (undated) DEVICE — DRAPE C ARM 42 X 120 10/BX

## (undated) DEVICE — DRAPE STERI U-SHAPED 47X51IN

## (undated) DEVICE — DRAPE INVISISHIELD TOWEL SMALL

## (undated) DEVICE — GLOVE SENSICARE PI GRN 8

## (undated) DEVICE — PACK SHOULDER

## (undated) DEVICE — DRAPE THREE-QTR REINF 53X77IN

## (undated) DEVICE — DRESSING MEPILEX 4X10IN

## (undated) DEVICE — BIT PANGEA DRILL SM 2.5X215MM

## (undated) DEVICE — PACK CUSTOM UNIV BASIN SLI

## (undated) DEVICE — BNDG COFLEX FOAM LF2 ST 6X5YD

## (undated) DEVICE — DRAPE U SPLIT SHEET 54X76IN

## (undated) DEVICE — GUIDEWIRE ORTHO 1.6X150MM
Type: IMPLANTABLE DEVICE | Site: SHOULDER | Status: NON-FUNCTIONAL
Removed: 2025-02-28

## (undated) DEVICE — GLOVE SENSICARE PI ALOE 7.5

## (undated) DEVICE — ADHESIVE DERMABOND ADVANCED

## (undated) DEVICE — SUT STRATAFIX SPRL PS-2 3-0

## (undated) DEVICE — SUT FIBERWIRE #5 1/2 X 38

## (undated) DEVICE — PAD ABDOMINAL STERILE 8X10IN

## (undated) DEVICE — STOCKINETTE IMPERV INTRM 9X44

## (undated) DEVICE — APPLICATOR CHLORAPREP ORN 26ML

## (undated) DEVICE — GOWN POLY REINF BRTH SLV XL

## (undated) DEVICE — GLOVE SENSICARE PI GRN 6.5

## (undated) DEVICE — SUT BLU BR 2 TAPERD NDL 1/2

## (undated) DEVICE — SLEEVE SCD EXPRESS KNEE MEDIUM

## (undated) DEVICE — STRAP OR TABLE 5IN X 72IN